# Patient Record
Sex: MALE | Race: WHITE | NOT HISPANIC OR LATINO | Employment: OTHER | URBAN - NONMETROPOLITAN AREA
[De-identification: names, ages, dates, MRNs, and addresses within clinical notes are randomized per-mention and may not be internally consistent; named-entity substitution may affect disease eponyms.]

---

## 2021-07-15 ENCOUNTER — APPOINTMENT (OUTPATIENT)
Dept: GENERAL RADIOLOGY | Facility: HOSPITAL | Age: 66
End: 2021-07-15

## 2021-07-15 ENCOUNTER — HOSPITAL ENCOUNTER (EMERGENCY)
Facility: HOSPITAL | Age: 66
Discharge: HOME | End: 2021-07-15
Attending: EMERGENCY MEDICINE | Admitting: EMERGENCY MEDICINE

## 2021-07-15 VITALS
DIASTOLIC BLOOD PRESSURE: 74 MMHG | RESPIRATION RATE: 18 BRPM | BODY MASS INDEX: 47.74 KG/M2 | SYSTOLIC BLOOD PRESSURE: 194 MMHG | WEIGHT: 315 LBS | TEMPERATURE: 98.1 F | OXYGEN SATURATION: 98 % | HEIGHT: 68 IN | HEART RATE: 74 BPM

## 2021-07-15 DIAGNOSIS — S43.031A: Primary | ICD-10-CM

## 2021-07-15 PROCEDURE — 73030 X-RAY EXAM OF SHOULDER: CPT

## 2021-07-15 PROCEDURE — 99283 EMERGENCY DEPT VISIT LOW MDM: CPT

## 2021-07-15 NOTE — ED PROVIDER NOTES
"Subjective   Chief Complaint: Right shoulder pain  History of Present Illness: 66-year-old male states he fell Sunday and sustained multiple injuries including fractures of T10 and T11 as well as a head injury, right shoulder injury and abrasions to his right knee and shin. He was seen in a hospital in [place]. He went saw his PCP for follow-up today and had a shoulder x-ray that showed \"dislocation\" he was sent to the ER for further evaluation. He states decreased range of motion of the right shoulder secondary to pain.  Onset: Sunday  Timing: Single inciting injury with persistent symptoms  Exacerbating / Alleviating factors: With range of motion and palpation of the right anterior shoulder  Associated symptoms: None      Nurses Notes reviewed and agree, including vitals, allergies, social history and prior medical history.          Review of Systems   Constitutional: Negative.    HENT: Negative.    Eyes: Negative.    Respiratory: Negative.    Cardiovascular: Negative.    Gastrointestinal: Negative.    Genitourinary: Negative.    Musculoskeletal:        Right shoulder pain   Skin: Negative.    Allergic/Immunologic: Negative.    Neurological: Negative.    Psychiatric/Behavioral: Negative.    All other systems reviewed and are negative.      Past Medical History:   Diagnosis Date   • Hypertension    • Injury of back    • Kidney stone    • Sleep apnea        No Known Allergies    Past Surgical History:   Procedure Laterality Date   • GASTRIC SLEEVE LAPAROSCOPIC     • HERNIA REPAIR     • SKIN BIOPSY         History reviewed. No pertinent family history.    Social History     Socioeconomic History   • Marital status:      Spouse name: Not on file   • Number of children: Not on file   • Years of education: Not on file   • Highest education level: Not on file   Tobacco Use   • Smoking status: Never Smoker   Substance and Sexual Activity   • Alcohol use: Never   • Drug use: Never           Objective   Physical " Exam  Vitals and nursing note reviewed.   Constitutional:       Appearance: Normal appearance. He is obese.   HENT:      Head: Normocephalic and atraumatic.      Nose: Nose normal.   Eyes:      Extraocular Movements: Extraocular movements intact.   Cardiovascular:      Rate and Rhythm: Normal rate and regular rhythm.      Pulses: Normal pulses.   Pulmonary:      Effort: Pulmonary effort is normal.   Musculoskeletal:      Right shoulder: Tenderness present. No deformity, effusion or laceration. Decreased range of motion. Normal strength. Normal pulse.      Cervical back: Normal range of motion.      Comments: Tenderness to the right anterior shoulder. Pain with active range of motion but I would passively move the patient's right upper extremity in abduction to horizontal as well as he is able to touch the back of his head with his right hand   Skin:     Comments: Abrasions to the right knee and shin, contusion to the right forehead   Neurological:      General: No focal deficit present.      Mental Status: He is alert. Mental status is at baseline.   Psychiatric:         Mood and Affect: Mood normal.         Behavior: Behavior normal.         Procedures           ED Course      Plain film shows inferior subluxation but unable to passively range the patient's shoulder with no signs of dislocation. Discussed with Dr. Marti. Will place in sling. Patient has pain meds at home as he is in pain management                                     MDM    Final diagnoses:   Inferior subluxation of right shoulder, initial encounter       ED Disposition  ED Disposition     ED Disposition Condition Comment    Discharge Stable           The orthopedist that your family doctor has referred you to    Schedule an appointment as soon as possible for a visit            Medication List      No changes were made to your prescriptions during this visit.          Kenton Solorzano PA-C  07/15/21 1600

## 2021-07-20 ENCOUNTER — OFFICE VISIT (OUTPATIENT)
Dept: ORTHOPEDIC SURGERY | Facility: CLINIC | Age: 66
End: 2021-07-20

## 2021-07-20 VITALS
BODY MASS INDEX: 47.74 KG/M2 | HEIGHT: 68 IN | WEIGHT: 315 LBS | TEMPERATURE: 97.3 F | SYSTOLIC BLOOD PRESSURE: 138 MMHG | DIASTOLIC BLOOD PRESSURE: 66 MMHG

## 2021-07-20 DIAGNOSIS — S89.92XA INJURY, KNEE, LEFT, INITIAL ENCOUNTER: Primary | ICD-10-CM
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
## 2021-07-20 DIAGNOSIS — S99.921A FOOT INJURY, RIGHT, INITIAL ENCOUNTER: ICD-10-CM

## 2021-07-20 DIAGNOSIS — S82.091A OTHER CLOSED FRACTURE OF RIGHT PATELLA, INITIAL ENCOUNTER: ICD-10-CM

## 2021-07-20 DIAGNOSIS — S92.344A CLOSED NONDISPLACED FRACTURE OF FOURTH METATARSAL BONE OF RIGHT FOOT, INITIAL ENCOUNTER: ICD-10-CM

## 2021-07-20 DIAGNOSIS — S80.211A ABRASION OF RIGHT KNEE, INITIAL ENCOUNTER: ICD-10-CM

## 2021-07-20 DIAGNOSIS — S92.514A CLOSED NONDISPLACED FRACTURE OF PROXIMAL PHALANX OF LESSER TOE OF RIGHT FOOT, INITIAL ENCOUNTER: ICD-10-CM

## 2021-07-20 PROCEDURE — 99204 OFFICE O/P NEW MOD 45 MIN: CPT | Performed by: PHYSICIAN ASSISTANT

## 2021-07-20 NOTE — PROGRESS NOTES
Subjective   Patient ID: Zbigniew Andrews is a 66 y.o. right hand dominant male  Initial Evaluation of the Right Knee (Here today for anterior knee pain.  States he was going down metal stairs on 7/7/21, when the step broke causing him to fall.  Was seen at ER in [place] with imaging.  Was recently seen at the [facility] ER on  7/11/21 with more imaging.) and Initial Evaluation of the Right Foot         History of Present Illness    Patient presents as a new patient with complaints of right knee and right foot pain that occurred after he fell down metal stairs on 7/7/2021.  He states the step broke causing him to fall from the fifth step.  He went to ER, he had x-rays of the right knee and right ankle which were negative for acute process.  He states he is feeling some better but still has discomfort to the right knee with an abrasion to the anterior right knee.    Pain Score: 5  Pain Location: Knee  Pain Orientation: Right     Pain Descriptors: Aching  Pain Frequency: Constant/continuous           Aggravating Factors: Bending           Result of Injury: Yes       Past Medical History:   Diagnosis Date   • Hypertension    • Injury of back    • Kidney stone    • Skin cancer    • Sleep apnea         Past Surgical History:   Procedure Laterality Date   • GASTRIC SLEEVE LAPAROSCOPIC     • HERNIA REPAIR     • SKIN BIOPSY         Family History   Problem Relation Age of Onset   • Hypertension Mother    • Cancer Mother    • Hypertension Father    • Heart disease Father        Social History     Socioeconomic History   • Marital status:      Spouse name: Not on file   • Number of children: Not on file   • Years of education: Not on file   • Highest education level: Not on file   Tobacco Use   • Smoking status: Never Smoker   • Smokeless tobacco: Never Used   Vaping Use   • Vaping Use: Never used   Substance and Sexual Activity   • Alcohol use: Never   • Drug use: Never   • Sexual activity: Defer          Current Outpatient Medications:   •  acetaminophen (TYLENOL) 500 MG tablet, Take 500 mg by mouth Every 4 (Four) Hours As Needed., Disp: , Rfl:   •  ASPIRIN 81 PO, aspirin  81mg, Disp: , Rfl:   •  cephalexin (KEFLEX) 500 MG capsule, Take 500 mg by mouth 3 (Three) Times a Day., Disp: , Rfl:   •  Cholecalciferol 50 MCG (2000 UT) tablet, Vitamin D2, Disp: , Rfl:   •  donepezil (ARICEPT) 10 MG tablet, donepezil 10 mg tablet, Disp: , Rfl:   •  fluticasone (FLONASE) 50 MCG/ACT nasal spray, fluticasone propionate 50 mcg/actuation nasal spray,suspension, Disp: , Rfl:   •  gabapentin (NEURONTIN) 300 MG capsule, gabapentin 300 mg capsule, Disp: , Rfl:   •  HYDROcodone-acetaminophen (NORCO) 5-325 MG per tablet, Take 1 tablet by mouth Every 6 (Six) Hours As Needed., Disp: , Rfl:   •  levocetirizine (XYZAL) 5 MG tablet, Take 5 mg by mouth Every Evening., Disp: , Rfl:   •  losartan (COZAAR) 100 MG tablet, losartan 100 mg tablet, Disp: , Rfl:   •  methocarbamol (Robaxin) 500 MG tablet, Robaxin 500 mg tablet, Disp: , Rfl:   •  multivitamin (MULTIPLE VITAMIN PO), Multiple Vitamin capsule  Daily, Disp: , Rfl:   •  niacin 500 MG tablet, Take 1,000 mg by mouth., Disp: , Rfl:   •  nystatin (nystatin) 901135 UNIT/GM powder, Nyamyc 100,000 unit/gram topical powder, Disp: , Rfl:   •  pyridoxine (VITAMIN B-6) 100 MG tablet, Take 1 tablet by mouth., Disp: , Rfl:   •  tamsulosin (FLOMAX) 0.4 MG capsule 24 hr capsule, tamsulosin 0.4 mg capsule, Disp: , Rfl:   •  asenapine maleate (SAPHRIS) 5 MG sublingual tablet sublingual tablet, Twice a day., Disp: , Rfl:   •  Cinnamon 500 MG capsule, Cinnamon, Disp: , Rfl:   •  guaiFENesin (Mucinex) 600 MG 12 hr tablet, 400 mg., Disp: , Rfl:     No Known Allergies    Review of Systems   Constitutional: Negative for diaphoresis, fever and unexpected weight change.   HENT: Negative for dental problem and sore throat.    Eyes: Negative for visual disturbance.   Respiratory: Negative for shortness of  "breath.    Cardiovascular: Negative for chest pain.   Gastrointestinal: Negative for abdominal pain, constipation, diarrhea, nausea and vomiting.   Genitourinary: Negative for difficulty urinating and frequency.   Musculoskeletal: Positive for arthralgias.   Neurological: Negative for headaches.   Hematological: Does not bruise/bleed easily.        I have reviewed the medical and surgical history, family history, social history, medications, and/or allergies, and the review of systems of this report.    Objective   /66   Temp 97.3 °F (36.3 °C)   Ht 172.7 cm (68\")   Wt (!) 154 kg (339 lb)   BMI 51.54 kg/m²    Physical Exam  Vitals and nursing note reviewed.   Constitutional:       Appearance: Normal appearance.   Pulmonary:      Effort: Pulmonary effort is normal.   Musculoskeletal:      Right knee: No deformity or effusion. Tenderness (superior patella) present.      Right ankle: No ecchymosis. No tenderness.      Right Achilles Tendon: No tenderness. Hazel's test negative.      Right foot: Normal capillary refill. Swelling and tenderness present. No deformity or bony tenderness. Normal pulse.   Neurological:      Mental Status: He is alert and oriented to person, place, and time.       Right Knee Exam     Other   Effusion: no effusion present           Extremity DVT signs are negative on physical exam with negative Lashell sign, no calf pain, no palpable cords and no skin tone change   Neurologic Exam     Mental Status   Oriented to person, place, and time.        The right knee extensor mechanism is intact.  Patient does have a noninfected superficial abrasion to the right anterior knee with a scab covering the abrasion          Assessment/Plan   Independent Review of Radiographic Studies:    I did review x-ray imaging from [facility] urgent care.  We also repeated x-ray imaging 3 views of the right knee weightbearing in the office reveal a nondisplaced superior patella fracture.  X-ray of the right " foot 3 view performed in the office reveal an acute fifth proximal phalanx fracture as well as a nondisplaced fourth metatarsal base fracture    Procedures       Diagnoses and all orders for this visit:    1. Injury, knee, left, initial encounter (Primary)  -     Cancel: XR Knee 3 View Left; Future    2. Foot injury, right, initial encounter  -     XR Foot 3+ View Right; Future    3. Other closed fracture of right patella, initial encounter    4. Closed nondisplaced fracture of proximal phalanx of lesser toe of right foot, initial encounter    5. Closed nondisplaced fracture of fourth metatarsal bone of right foot, initial encounter    6. Fall from slip, trip, or stumble, initial encounter    7. Abrasion of right knee, initial encounter       Orthopedic activities reviewed and patient expressed appreciation  Discussion of orthopedic goals  Risk, benefits, and merits of treatment alternatives reviewed with the patient and questions answered  Ice, heat, and/or modalities as beneficial    Recommendations/Plan:  Exercise, medications, injections, other patient advice, and return appointment as noted.  Patient is encouraged to call or return for any issues or concerns.  Patient was provided a fracture shoe as well as a tour right knee immobilizer.  Follow-up in 3 weeks repeat x-ray of the right knee and right foot    Patient agreeable to call or return sooner for any concerns.             EMR Dragon-transcription disclaimer:  This encounter note is an electronic transcription of spoken language to printed text.  Electronic transcription of spoken language may permit erroneous or at times nonsensical words or phrases to be inadvertently transcribed.  Although I have reviewed the note for such errors, some may still exist

## 2021-07-26 ENCOUNTER — OFFICE VISIT (OUTPATIENT)
Dept: ORTHOPEDIC SURGERY | Facility: CLINIC | Age: 66
End: 2021-07-26

## 2021-07-26 VITALS
WEIGHT: 315 LBS | RESPIRATION RATE: 18 BRPM | DIASTOLIC BLOOD PRESSURE: 78 MMHG | SYSTOLIC BLOOD PRESSURE: 130 MMHG | HEIGHT: 68 IN | BODY MASS INDEX: 47.74 KG/M2 | TEMPERATURE: 97.2 F
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
## 2021-07-26 DIAGNOSIS — M25.511 ACUTE PAIN OF RIGHT SHOULDER: Primary | ICD-10-CM

## 2021-07-26 DIAGNOSIS — S43.001A SHOULDER SUBLUXATION, RIGHT, INITIAL ENCOUNTER: ICD-10-CM

## 2021-07-26 DIAGNOSIS — M25.511 ACUTE PAIN OF RIGHT SHOULDER: ICD-10-CM

## 2021-07-26 PROCEDURE — 99213 OFFICE O/P EST LOW 20 MIN: CPT | Performed by: PHYSICIAN ASSISTANT

## 2021-07-26 RX ORDER — DIAZEPAM 5 MG/1
TABLET ORAL
Qty: 1 TABLET | Refills: 0 | Status: ON HOLD | OUTPATIENT
Start: 2021-07-26 | End: 2021-10-21

## 2021-07-26 NOTE — PROGRESS NOTES
Subjective   Patient ID: Zbigniew Andrews is a 66 y.o. right hand dominant male  Pain of the Right Shoulder (7/11/21 fell down stairs. Went to the ER 7/15/21.)         History of Present Illness    Patient presents with complaints of right shoulder injury that occurred after he fell down some steps on 7/11/2021.  He did have x-rays from the [facility] ER which revealed a subluxed right shoulder.  Patient notes improvement since the initial injury with his right shoulder.  Though, he does continue to experience pain with outward and upward movement.  Of note-I did review the urgent care notes that he was recently seen for x-rays of his cervical, thoracic and lumbar spine.  He was also noted to have some drainage from his right knee abrasion which is since subsided.  He is currently taking clindamycin for the right knee abrasion.  Patient and his wife states that the right knee appears to be improving    Past Medical History:   Diagnosis Date   • Hypertension    • Injury of back    • Kidney stone    • Skin cancer    • Sleep apnea         Past Surgical History:   Procedure Laterality Date   • GASTRIC SLEEVE LAPAROSCOPIC     • HERNIA REPAIR     • SKIN BIOPSY         Family History   Problem Relation Age of Onset   • Hypertension Mother    • Cancer Mother    • Hypertension Father    • Heart disease Father        Social History     Socioeconomic History   • Marital status:      Spouse name: Not on file   • Number of children: Not on file   • Years of education: Not on file   • Highest education level: Not on file   Tobacco Use   • Smoking status: Never Smoker   • Smokeless tobacco: Never Used   Vaping Use   • Vaping Use: Never used   Substance and Sexual Activity   • Alcohol use: Never   • Drug use: Never   • Sexual activity: Defer         Current Outpatient Medications:   •  acetaminophen (TYLENOL) 500 MG tablet, Take 500 mg by mouth Every 4 (Four) Hours As Needed., Disp: , Rfl:   •  asenapine maleate  (SAPHRIS) 5 MG sublingual tablet sublingual tablet, Twice a day., Disp: , Rfl:   •  ASPIRIN 81 PO, aspirin  81mg, Disp: , Rfl:   •  calcitonin, salmon, (MIACALCIN) 200 UNIT/ACT nasal spray, , Disp: , Rfl:   •  Cholecalciferol 50 MCG (2000 UT) tablet, Vitamin D2, Disp: , Rfl:   •  Cinnamon 500 MG capsule, Cinnamon, Disp: , Rfl:   •  clindamycin (CLEOCIN) 300 MG capsule, Take 1 capsule by mouth 3 (Three) Times a Day for 7 days., Disp: 21 capsule, Rfl: 0  •  diazePAM (Valium) 5 MG tablet, Take one tablet 30 minutes prior to MRI, Disp: 1 tablet, Rfl: 0  •  donepezil (ARICEPT) 10 MG tablet, donepezil 10 mg tablet, Disp: , Rfl:   •  fluticasone (FLONASE) 50 MCG/ACT nasal spray, fluticasone propionate 50 mcg/actuation nasal spray,suspension, Disp: , Rfl:   •  gabapentin (NEURONTIN) 300 MG capsule, gabapentin 300 mg capsule, Disp: , Rfl:   •  guaiFENesin (Mucinex) 600 MG 12 hr tablet, 400 mg., Disp: , Rfl:   •  HYDROcodone-acetaminophen (NORCO) 5-325 MG per tablet, Take 1 tablet by mouth Every 6 (Six) Hours As Needed., Disp: , Rfl:   •  levocetirizine (XYZAL) 5 MG tablet, Take 5 mg by mouth Every Evening., Disp: , Rfl:   •  losartan (COZAAR) 100 MG tablet, losartan 100 mg tablet, Disp: , Rfl:   •  methocarbamol (Robaxin) 500 MG tablet, Robaxin 500 mg tablet, Disp: , Rfl:   •  multivitamin (MULTIPLE VITAMIN PO), Multiple Vitamin capsule  Daily, Disp: , Rfl:   •  mupirocin (BACTROBAN) 2 % ointment, , Disp: , Rfl:   •  niacin 500 MG tablet, Take 1,000 mg by mouth., Disp: , Rfl:   •  nystatin (nystatin) 457979 UNIT/GM powder, Nyamyc 100,000 unit/gram topical powder, Disp: , Rfl:   •  pyridoxine (VITAMIN B-6) 100 MG tablet, Take 1 tablet by mouth., Disp: , Rfl:   •  tamsulosin (FLOMAX) 0.4 MG capsule 24 hr capsule, tamsulosin 0.4 mg capsule, Disp: , Rfl:   •  traMADol (ULTRAM) 50 MG tablet, , Disp: , Rfl:     No Known Allergies    Review of Systems   Constitutional: Negative for fever.   HENT: Negative for dental problem and  "voice change.    Eyes: Negative for visual disturbance.   Respiratory: Negative for shortness of breath.    Cardiovascular: Negative for chest pain.   Gastrointestinal: Negative for abdominal pain.   Genitourinary: Negative for dysuria.   Musculoskeletal: Positive for arthralgias (right shoulder ), gait problem (presents with cane) and joint swelling (right shoulder ).   Skin: Negative for rash.   Neurological: Negative for speech difficulty.   Hematological: Does not bruise/bleed easily.   Psychiatric/Behavioral: Negative for confusion.       I have reviewed the medical and surgical history, family history, social history, medications, and/or allergies, and the review of systems of this report.    Objective   /78 (BP Location: Left arm, Patient Position: Sitting, Cuff Size: Large Adult)   Temp 97.2 °F (36.2 °C)   Resp 18   Ht 172.7 cm (67.99\")   Wt (!) 153 kg (338 lb)   BMI 51.40 kg/m²    Physical Exam  Vitals and nursing note reviewed.   Constitutional:       Appearance: Normal appearance.   Pulmonary:      Effort: Pulmonary effort is normal.   Neurological:      Mental Status: He is alert and oriented to person, place, and time.   Psychiatric:         Behavior: Behavior normal.       Right Shoulder Exam     Range of Motion   Active abduction: 110   Passive abduction: 120   Extension: 20   Forward flexion: 120     Tests   Cross arm: positive  Impingement: positive  Drop arm: positive    Other   Sensation: normal  Pulse: present             Neurologic Exam     Mental Status   Oriented to person, place, and time.        There are no physical exam findings to suggest cellulitis or septic joint of the right knee.  He has full range of motion to the right knee.  The superficial abrasion is healing well      Assessment/Plan   Independent Review of Radiographic Studies:    No new imaging done today.  Narrative & Impression   PROCEDURE: XR SHOULDER 2+ VW RIGHT-     History: injury, was told by pcp yesterday it " was dislocated     COMPARISON: None.     FINDINGS:  A 3 view exam demonstrates no acute fracture. The humerus  appears inferiorly subluxed, however it is not in a subcoracoid  position. No soft tissue abnormality is seen.     IMPRESSION:  Inferior subluxation of the humerus with no evidence of a  fracture.               This report was finalized on 7/15/2021 3:25 PM by Olu Kaur M.D..         Procedures       Diagnoses and all orders for this visit:    1. Fall from slip, trip, or stumble, initial encounter (Primary)  -     MRI Shoulder Right Without Contrast    2. Shoulder subluxation, right, initial encounter  -     MRI Shoulder Right Without Contrast    3. Acute pain of right shoulder  -     MRI Shoulder Right Without Contrast       Orthopedic activities reviewed and patient expressed appreciation  Discussion of orthopedic goals  Risk, benefits, and merits of treatment alternatives reviewed with the patient and questions answered  Ice, heat, and/or modalities as beneficial    Recommendations/Plan:  Exercise, medications, injections, other patient advice, and return appointment as noted.  Patient is encouraged to call or return for any issues or concerns.  Follow-up after MRI  Patient agreeable to call or return sooner for any concerns.               EMR Dragon-transcription disclaimer:  This encounter note is an electronic transcription of spoken language to printed text.  Electronic transcription of spoken language may permit erroneous or at times nonsensical words or phrases to be inadvertently transcribed.  Although I have reviewed the note for such errors, some may still exist

## 2021-08-06 ENCOUNTER — OFFICE VISIT (OUTPATIENT)
Dept: NEUROSURGERY | Facility: CLINIC | Age: 66
End: 2021-08-06

## 2021-08-06 VITALS — BODY MASS INDEX: 47.74 KG/M2 | WEIGHT: 315 LBS | HEIGHT: 68 IN | TEMPERATURE: 97.3 F

## 2021-08-06 DIAGNOSIS — E66.01 MORBID OBESITY (HCC): ICD-10-CM

## 2021-08-06 DIAGNOSIS — M54.50 ACUTE BILATERAL LOW BACK PAIN WITHOUT SCIATICA: Primary | ICD-10-CM

## 2021-08-06 DIAGNOSIS — M54.2 NECK PAIN: ICD-10-CM

## 2021-08-06 PROCEDURE — 99203 OFFICE O/P NEW LOW 30 MIN: CPT | Performed by: NEUROLOGICAL SURGERY

## 2021-08-06 NOTE — PROGRESS NOTES
Patient: Zbigniew Andrews  : 1955    Primary Care Provider: Katina Cote APRN    Requesting Provider: As above        History    Chief Complaint:   1.  Low back pain muscle spasm.  2.  Neck pain.    History of Present Illness: Mr. Andrews is a 66-year-old retired gentleman who formerly worked for the [employer] company.  He is had chronic spinal difficulties and has known extensive arthritic change throughout his spine.  He has been treated for diminished range of motion and SI joint dysfunction in the past.  He has undergone spinal injections in the past.  His symptoms recently flared up after he suffered a fall on 2021 when some steps gave out under him.  His main complaint is left lower back pain with muscle spasm.  He has had some numbness in the right leg below the knee and in the left foot that comes and goes.  He denies any radicular leg pain.  He has no chest or abdominal symptoms.  He has neck pain without radicular spread of his symptoms.  He denies any bladder difficulties.  By report he had multiple other injuries and has had symptoms in his feet, his right knee, and his right shoulder since his accident.  He also describes tenderness over the right eyebrow.  He reports having suffered a closed head injury related to this fall.  The patient has had weight loss surgery and has lost 90 pounds.    Review of Systems   Constitutional: Positive for activity change and appetite change. Negative for chills, diaphoresis, fatigue, fever and unexpected weight change.   HENT: Negative for congestion, dental problem, drooling, ear discharge, ear pain, facial swelling, hearing loss, mouth sores, nosebleeds, postnasal drip, rhinorrhea, sinus pressure, sinus pain, sneezing, sore throat, tinnitus, trouble swallowing and voice change.    Eyes: Negative for photophobia, pain, discharge, redness, itching and visual disturbance.   Respiratory: Positive for apnea. Negative for cough, choking, chest tightness,  "shortness of breath, wheezing and stridor.    Cardiovascular: Positive for leg swelling. Negative for chest pain and palpitations.   Gastrointestinal: Positive for diarrhea. Negative for abdominal distention, abdominal pain, anal bleeding, blood in stool, constipation, nausea, rectal pain and vomiting.   Endocrine: Negative for cold intolerance, heat intolerance, polydipsia, polyphagia and polyuria.   Genitourinary: Negative for decreased urine volume, difficulty urinating, dysuria, enuresis, flank pain, frequency, genital sores, hematuria and urgency.   Musculoskeletal: Positive for arthralgias, back pain, gait problem, joint swelling, myalgias, neck pain and neck stiffness.   Skin: Positive for wound. Negative for color change, pallor and rash.   Allergic/Immunologic: Positive for environmental allergies. Negative for food allergies and immunocompromised state.   Neurological: Negative for dizziness, tremors, seizures, syncope, facial asymmetry, speech difficulty, weakness, light-headedness, numbness and headaches.   Hematological: Negative for adenopathy. Does not bruise/bleed easily.   Psychiatric/Behavioral: Positive for decreased concentration and sleep disturbance. Negative for agitation, behavioral problems, confusion, dysphoric mood, hallucinations, self-injury and suicidal ideas. The patient is not nervous/anxious and is not hyperactive.    All other systems reviewed and are negative.      The patient's past medical history, past surgical history, family history, and social history have been reviewed at length in the electronic medical record.    Physical Exam:   Temp 97.3 °F (36.3 °C)   Ht 172.7 cm (68\")   Wt (!) 158 kg (347 lb 6.4 oz)   BMI 52.82 kg/m²   CONSTITUTIONAL: Patient is well-nourished, pleasant and appears stated age.  CV: Heart regular rate and rhythm without murmur, rub, or gallop.  PULMONARY: Lungs are clear to ascultation.  MUSCULOSKELETAL:  Neck tenderness to palpation is not observed. "   ROM in the neck is mildly limited in all directions.  Straight leg raising is negative.  Yovanny signs are negative.  There is no tenderness in his dorsal neck, mid back, low back to palpation.  Engine motion is somewhat limited in his right shoulder due to pain.  NEUROLOGICAL:  Orientation, memory, attention span, language function, and cognition have been examined and are intact.  Strength is intact in the upper and lower extremities to direct testing.  Muscle tone is normal throughout.  Station and gait are normal.  Sensation is intact to light touch testing throughout.  Deep tendon reflexes are difficult to elicit throughout..  Lupillo's Sign is negative bilaterally. No clonus is elicited.  Coordination is intact.      Medical Decision Making    Data Review:   (All imaging studies were personally reviewed unless stated otherwise)  I reviewed CTs of the cervical, thoracic, and lumbar spine.  Prominent anterior osteophytes are present throughout the spinal axis.  There is diffuse spondylosis.  There may be some moderate spinal stenosis at L4-5.  There is a small fracture through an anterior osteophyte at the T10-11 level.  This does not involve or compromise the spine.    Diagnosis:   1.  Small osteophyte fracture in the low thoracic region.  This is not concordant with his pain is really in his left lower back and upper buttock region.  2.  Diffuse spinal strain.    Treatment Options:   There is no role for surgery in this setting.  The patient states the symptoms are are already substantially improved.  Treatment will need to remain symptomatic.  I expect his symptoms to dissipate over time.  There is no lasting physiologic compromise to his spine from his reported injury.       Diagnosis Plan   1. Morbid obesity (CMS/[facility])         Scribed for Tejas Brewer MD by GHASSAN Lyons 8/6/2021 11:52 EDT      I, Dr. Brewer, personally performed the services described in the documentation, as scribed in my  presence, and it is both accurate and complete.

## 2021-08-17 ENCOUNTER — HOSPITAL ENCOUNTER (OUTPATIENT)
Dept: MRI IMAGING | Facility: HOSPITAL | Age: 66
Discharge: HOME | End: 2021-08-17
Admitting: PHYSICIAN ASSISTANT

## 2021-08-17 PROCEDURE — 73221 MRI JOINT UPR EXTREM W/O DYE: CPT

## 2021-08-19 ENCOUNTER — OFFICE VISIT (OUTPATIENT)
Dept: ORTHOPEDIC SURGERY | Facility: CLINIC | Age: 66
End: 2021-08-19

## 2021-08-19 VITALS
TEMPERATURE: 96.8 F | DIASTOLIC BLOOD PRESSURE: 70 MMHG | BODY MASS INDEX: 47.74 KG/M2 | SYSTOLIC BLOOD PRESSURE: 138 MMHG | HEIGHT: 68 IN | RESPIRATION RATE: 18 BRPM | WEIGHT: 315 LBS

## 2021-08-19 DIAGNOSIS — M25.571 ARTHRALGIA OF RIGHT FOOT: Primary | ICD-10-CM

## 2021-08-19 DIAGNOSIS — M25.511 ACUTE PAIN OF RIGHT SHOULDER: ICD-10-CM

## 2021-08-19 DIAGNOSIS — S92.514A CLOSED NONDISPLACED FRACTURE OF PROXIMAL PHALANX OF LESSER TOE OF RIGHT FOOT, INITIAL ENCOUNTER: ICD-10-CM

## 2021-08-19 DIAGNOSIS — S46.011A TRAUMATIC TEAR OF RIGHT ROTATOR CUFF, UNSPECIFIED TEAR EXTENT, INITIAL ENCOUNTER: ICD-10-CM

## 2021-08-19 DIAGNOSIS — M19.019 AC JOINT ARTHROPATHY: ICD-10-CM

## 2021-08-19 DIAGNOSIS — S43.431A LABRAL TEAR OF SHOULDER, RIGHT, INITIAL ENCOUNTER: ICD-10-CM

## 2021-08-19 DIAGNOSIS — S92.344A CLOSED NONDISPLACED FRACTURE OF FOURTH METATARSAL BONE OF RIGHT FOOT, INITIAL ENCOUNTER: ICD-10-CM

## 2021-08-19 PROCEDURE — 99214 OFFICE O/P EST MOD 30 MIN: CPT | Performed by: PHYSICIAN ASSISTANT

## 2021-08-19 RX ORDER — SERTRALINE HYDROCHLORIDE 25 MG/1
25 TABLET, FILM COATED ORAL DAILY
COMMUNITY
Start: 2021-08-09 | End: 2023-02-01 | Stop reason: SDUPTHER

## 2021-08-19 RX ORDER — TRAMADOL HYDROCHLORIDE 50 MG/1
50 TABLET ORAL EVERY 8 HOURS PRN
COMMUNITY
Start: 2021-08-13 | End: 2021-10-21 | Stop reason: HOSPADM

## 2021-08-19 RX ORDER — GABAPENTIN 600 MG/1
600 TABLET ORAL 2 TIMES DAILY
COMMUNITY
Start: 2021-08-13

## 2021-08-19 NOTE — PROGRESS NOTES
Subjective   Patient ID: Zbigniew Andrews is a 66 y.o. right hand dominant male  Follow-up of the Right Shoulder (MRI results.), Follow-up of the Right Knee (Doi: 7/11/21.), and Follow-up of the Right Foot         History of Present Illness  Patient is following up to review MRI results of the right shoulder as well as following up for his right knee abrasion and right foot fractures.  He denies having any pain to the knees or the right foot.  He has been wearing a fracture shoe as directed since his initial visit.    He reports that most of his pain is from his back fractures which was recently evaluated by neurosurgery-Dr. Manav Brewer.  He does have right shoulder pain and soreness with certain positions.                                                 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         Past Surgical History:   Procedure Laterality Date   • GASTRIC SLEEVE LAPAROSCOPIC     • HERNIA REPAIR     • PYLOROMYOTOMY  1954   • SKIN BIOPSY     • UMBILICAL HERNIA REPAIR  2000       Family History   Problem Relation Age of Onset   • Hypertension Mother    • Cancer Mother    • Arthritis Mother    • Asthma Mother    • Ovarian cancer Mother    • Hypertension Father    • Heart disease Father    • Arthritis Father    • Kidney disease Father    • Alzheimer's disease Father    • Kidney failure Father    • Arthritis Sister    • Arthritis Brother    • Hypertension Brother    • Hepatitis Brother    • Atrial fibrillation Brother        Social History     Socioeconomic History   • Marital status:      Spouse name: Not on file   • Number of children: Not on file   • Years of education: Not on file   • Highest education  level: Not on file   Tobacco Use   • Smoking status: Never Smoker   • Smokeless tobacco: Never Used   Vaping Use   • Vaping Use: Never used   Substance and Sexual Activity   • Alcohol use: Never     Comment: Social, quit [id number]   • Drug use: Never   • Sexual activity: Defer         Current Outpatient Medications:   •  acetaminophen (TYLENOL) 500 MG tablet, Take 500 mg by mouth Every 4 (Four) Hours As Needed., Disp: , Rfl:   •  Cholecalciferol 50 MCG (2000 UT) tablet, Vitamin D2, Disp: , Rfl:   •  Cinnamon 500 MG capsule, Cinnamon, Disp: , Rfl:   •  diazePAM (Valium) 5 MG tablet, Take one tablet 30 minutes prior to MRI (Patient taking differently: Take 5 mg by mouth. Take one tablet 30 minutes prior to MRI.), Disp: 1 tablet, Rfl: 0  •  donepezil (ARICEPT) 10 MG tablet, donepezil 10 mg tablet, Disp: , Rfl:   •  fluticasone (FLONASE) 50 MCG/ACT nasal spray, fluticasone propionate 50 mcg/actuation nasal spray,suspension, Disp: , Rfl:   •  gabapentin (NEURONTIN) 300 MG capsule, Take 300 mg by mouth 3 (Three) Times a Day., Disp: , Rfl:   •  gabapentin (NEURONTIN) 600 MG tablet, , Disp: , Rfl:   •  guaiFENesin (Mucinex) 600 MG 12 hr tablet, 400 mg., Disp: , Rfl:   •  HYDROcodone-acetaminophen (NORCO) 5-325 MG per tablet, Take 1 tablet by mouth Every 6 (Six) Hours As Needed., Disp: , Rfl:   •  levocetirizine (XYZAL) 5 MG tablet, Take 5 mg by mouth Every Evening., Disp: , Rfl:   •  losartan (COZAAR) 100 MG tablet, losartan 100 mg tablet, Disp: , Rfl:   •  methocarbamol (Robaxin) 500 MG tablet, Robaxin 500 mg tablet, Disp: , Rfl:   •  multivitamin (MULTIPLE VITAMIN PO), Multiple Vitamin capsule  Daily, Disp: , Rfl:   •  niacin 500 MG tablet, Take 1,000 mg by mouth., Disp: , Rfl:   •  nystatin (nystatin) 007241 UNIT/GM powder, Apply  topically to the appropriate area as directed As Needed., Disp: , Rfl:   •  pyridoxine (VITAMIN B-6) 100 MG tablet, Take 1 tablet by mouth., Disp: , Rfl:   •  sertraline (ZOLOFT) 25 MG tablet, ,  "Disp: , Rfl:   •  tamsulosin (FLOMAX) 0.4 MG capsule 24 hr capsule, tamsulosin 0.4 mg capsule, Disp: , Rfl:   •  traMADol (ULTRAM) 50 MG tablet, , Disp: , Rfl:     No Known Allergies    Review of Systems   Constitutional: Negative for fever.   HENT: Negative for dental problem and voice change.    Eyes: Negative for visual disturbance.   Respiratory: Negative for shortness of breath.    Cardiovascular: Negative for chest pain.   Gastrointestinal: Negative for abdominal pain.   Genitourinary: Negative for dysuria.   Musculoskeletal: Positive for arthralgias (right shoulder/back.) and joint swelling (right ankle ). Negative for gait problem.   Skin: Negative for rash.   Neurological: Negative for speech difficulty.   Hematological: Does not bruise/bleed easily.   Psychiatric/Behavioral: Negative for confusion.       I have reviewed the medical and surgical history, family history, social history, medications, and/or allergies, and the review of systems of this report.    Objective   /70 (BP Location: Left arm, Patient Position: Sitting, Cuff Size: Large Adult)   Temp 96.8 °F (36 °C)   Resp 18   Ht 172.7 cm (67.99\")   Wt (!) 157 kg (346 lb)   BMI 52.62 kg/m²    Physical Exam  Vitals and nursing note reviewed.   Constitutional:       Appearance: Normal appearance.   Pulmonary:      Effort: Pulmonary effort is normal.   Musculoskeletal:      Right knee: No erythema, ecchymosis or bony tenderness. No tenderness.      Right foot: Normal capillary refill. No swelling, deformity, Charcot foot, foot drop, tenderness or bony tenderness. Normal pulse.      Comments: The abrasion to right knee has healed well.   Neurological:      Mental Status: He is alert and oriented to person, place, and time.       Right Shoulder Exam     Tenderness   The patient is experiencing tenderness in the biceps tendon and acromioclavicular joint.    Range of Motion   Active abduction: 110   Passive abduction: 120   Forward flexion: 120 "   Internal rotation 0 degrees: Sacrum     Tests   Apprehension: positive  Cross arm: positive  Drop arm: positive    Other   Sensation: normal  Pulse: present           Extremity DVT signs are negative on physical exam with negative Lashell sign, no calf pain, no palpable cords and no skin tone change   Neurologic Exam     Mental Status   Oriented to person, place, and time.              Assessment/Plan   Independent Review of Radiographic Studies:    X-ray of the right foot 3 view performed in the office independently reviewed reveals healed fractures of the proximal phalanx as well as fourth metatarsal bone  Study Result    Narrative & Impression   PROCEDURE: MRI SHOULDER RIGHT WO CONTRAST-     HISTORY: Shoulder pain, rotator cuff disorder suspected, xray done     PROCEDURE: Multiplanar multisequence imaging of the shoulder was  performed.     FINDINGS: There is a high-grade articular sided partial-thickness tear  of the supraspinatus tendon. There is a possible small full-thickness  tear of the subscapularis tendon at its attachment site to the greater  tuberosity seen on sagittal image six measuring 5 mm. There is no  tendinous retraction. The infraspinatus and teres minor tendons appear  intact. There is no evidence of abnormal rotator cuff muscle edema or  atrophy. The biceps tendon follows its normal intra-articular course and  is located within the bicipital groove. There is abnormal signal within  the superior labrum which may reflect a labral tear. There is no joint  capsule thickening. There is a type II acromion. Hypertrophic  degenerative changes are present at the acromioclavicular joint. Fluid  is seen in the subacromial/subdeltoid bursa.     IMPRESSION:     1. High-grade partial-thickness tear of the supraspinatus tendon.  2. Suggestion of a small, 5 mm full-thickness tear of the subscapularis  tendon at its attachment site to the greater tuberosity.  3. Abnormal signal in the superior labrum  "suspicious for tear.  4. Acromioclavicular joint space arthrosis.     This report was finalized on 8/18/2021 7:42 AM by Olu Kaur M.D..             Procedures       Diagnoses and all orders for this visit:    1. Arthralgia of right foot (Primary)  -     XR Foot 3+ View Right    2. Acute pain of right shoulder    3. Closed nondisplaced fracture of proximal phalanx of lesser toe of right foot, initial encounter    4. Closed nondisplaced fracture of fourth metatarsal bone of right foot, initial encounter    5. Traumatic tear of right rotator cuff, unspecified tear extent, initial encounter    6. Labral tear of shoulder, right, initial encounter    7. AC joint arthropathy       Discussion of orthopedic goals  Risk, benefits, and merits of treatment alternatives reviewed with the patient and questions answered  The nature of the proposed surgery reviewed with the patient including risks, benefits, rehabilitation, recovery timeframe, and outcome expectations    Recommendations/Plan:  Surgery: Surgery proposed at this visit as noted.  Patient is encouraged to call or return for any issues or concerns.    Patient agreeable to call or return sooner for any concerns.  Patient no longer requires the fracture shoe.  We discussed in detail the treatment options which included the right shoulder cortisone injection followed by physical therapy versus right shoulder arthroscopy.  Patient states he would like to proceed with shoulder arthroscopy to \"fix the problem\"    Due to the patient's severe central sleep apnea he will need clearance from his pulmonologist located in [place].( Dr. Moya)  Plan: Right shoulder arthroscopy, subacromial decompression, distal clavicle excision with acromioplasty.  Repair rotator cuff tendon, labral repair versus debridement and related procedures             EMR Dragon-transcription disclaimer:  This encounter note is an electronic transcription of spoken language to printed text.  " Electronic transcription of spoken language may permit erroneous or at times nonsensical words or phrases to be inadvertently transcribed.  Although I have reviewed the note for such errors, some may still exist

## 2021-10-01 ENCOUNTER — OFFICE VISIT (OUTPATIENT)
Dept: CARDIOLOGY | Facility: CLINIC | Age: 66
End: 2021-10-01

## 2021-10-01 ENCOUNTER — PATIENT ROUNDING (BHMG ONLY) (OUTPATIENT)
Dept: CARDIOLOGY | Facility: CLINIC | Age: 66
End: 2021-10-01

## 2021-10-01 VITALS
WEIGHT: 315 LBS | BODY MASS INDEX: 49.44 KG/M2 | DIASTOLIC BLOOD PRESSURE: 60 MMHG | HEART RATE: 69 BPM | HEIGHT: 67 IN | OXYGEN SATURATION: 97 % | SYSTOLIC BLOOD PRESSURE: 90 MMHG

## 2021-10-01 DIAGNOSIS — E66.01 MORBID OBESITY WITH BMI OF 50.0-59.9, ADULT (HCC): ICD-10-CM

## 2021-10-01 DIAGNOSIS — R07.9 CHEST PAIN, UNSPECIFIED TYPE: Primary | ICD-10-CM

## 2021-10-01 DIAGNOSIS — I10 PRIMARY HYPERTENSION: ICD-10-CM

## 2021-10-01 PROCEDURE — 93000 ELECTROCARDIOGRAM COMPLETE: CPT | Performed by: INTERNAL MEDICINE

## 2021-10-01 PROCEDURE — 99204 OFFICE O/P NEW MOD 45 MIN: CPT | Performed by: INTERNAL MEDICINE

## 2021-10-01 RX ORDER — LOSARTAN POTASSIUM 50 MG/1
50 TABLET ORAL DAILY
Qty: 90 TABLET | Refills: 3 | Status: SHIPPED | OUTPATIENT
Start: 2021-10-01 | End: 2022-10-12 | Stop reason: SDUPTHER

## 2021-10-01 NOTE — PROGRESS NOTES
"     [facility] Cardiology OP Consult Note    Zbigniew Andrews  [id number]  10/01/2021    Referred By: Katina Cote APRN    Chief Complaint: Chest pain    History of Present Illness:   Mr. Zbigniew Andrews is a 66 y.o. male who presents to the Cardiology Clinic for evaluation of chest pain.  The patient has a past medical history significant for hypertension, gout, BPH, and obesity with a BMI 54 kg/m².  He does not have any significant past cardiac history.  He reports undergoing ischemic evaluation including a coronary angiogram in [place] in , with no significant CAD at that time.  He presents to cardiology clinic today for evaluation of chest pain.  The patient reports sustaining a fall several months ago, which resulted in several vertebral fractures.  Since that time, he has had a bilateral chest discomfort which ranges from \"sharp\" to \"dull and squeezing.\"  He reports the discomfort is primarily aggravated with deep respirations.  He denies any association of his chest pain with exertion.  No significant exertional dyspnea.  No history of orthopnea, PND, or lower extremity swelling.  He denies any nausea, vomiting, or diaphoresis associated with his chest discomfort.  No other specific complaints today.    Past Cardiac Testin. Last Coronary Angio: , reportedly no significant CAD  2. Prior Stress Testing: None known  3. Last Echo: Records pending  4. Prior Holter Monitor: None    Review of Systems:   Review of Systems   Constitutional: Negative for activity change, appetite change, chills, diaphoresis, fatigue, fever, unexpected weight gain and unexpected weight loss.   Eyes: Negative for blurred vision and double vision.   Respiratory: Positive for chest tightness. Negative for cough, shortness of breath and wheezing.    Cardiovascular: Positive for chest pain. Negative for palpitations and leg swelling.   Gastrointestinal: Negative for abdominal pain, anal bleeding, blood in " stool and GERD.   Endocrine: Negative for cold intolerance and heat intolerance.   Genitourinary: Negative for hematuria.   Neurological: Negative for dizziness, syncope, weakness and light-headedness.   Hematological: Does not bruise/bleed easily.   Psychiatric/Behavioral: Negative for depressed mood and stress. The patient is not nervous/anxious.        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        Past Surgical History:   Past Surgical History:   Procedure Laterality Date   • GASTRIC SLEEVE LAPAROSCOPIC     • HERNIA REPAIR     • PYLOROMYOTOMY  1954   • SKIN BIOPSY     • UMBILICAL HERNIA REPAIR  2000       Family History:   Family History   Problem Relation Age of Onset   • Hypertension Mother    • Cancer Mother    • Arthritis Mother    • Asthma Mother    • Ovarian cancer Mother    • Hypertension Father    • Heart disease Father    • Arthritis Father    • Kidney disease Father    • Alzheimer's disease Father    • Kidney failure Father    • Arthritis Sister    • Arthritis Brother    • Hypertension Brother    • Hepatitis Brother    • Atrial fibrillation Brother        Social History:   Social History     Socioeconomic History   • Marital status:      Spouse name: Not on file   • Number of children: Not on file   • Years of education: Not on file   • Highest education level: Not on file   Tobacco Use   • Smoking status: Never Smoker   • Smokeless tobacco: Never Used   Vaping Use   • Vaping Use: Never used   Substance and Sexual Activity   • Alcohol use: Never     Comment: Social, quit [id number]   • Drug use: Never   • Sexual activity: Defer       Medications:     Current Outpatient Medications:   •   acetaminophen (TYLENOL) 500 MG tablet, Take 500 mg by mouth Every 4 (Four) Hours As Needed., Disp: , Rfl:   •  Ascorbic Acid (VITAMIN C PO), Take  by mouth., Disp: , Rfl:   •  Cholecalciferol 50 MCG (2000 UT) tablet, Vitamin D2, Disp: , Rfl:   •  Cinnamon 500 MG capsule, Cinnamon, Disp: , Rfl:   •  donepezil (ARICEPT) 10 MG tablet, donepezil 10 mg tablet, Disp: , Rfl:   •  fluticasone (FLONASE) 50 MCG/ACT nasal spray, fluticasone propionate 50 mcg/actuation nasal spray,suspension, Disp: , Rfl:   •  gabapentin (NEURONTIN) 600 MG tablet, , Disp: , Rfl:   •  Glucosamine HCl (GLUCOSAMINE PO), Take  by mouth., Disp: , Rfl:   •  guaiFENesin (Mucinex) 600 MG 12 hr tablet, 400 mg., Disp: , Rfl:   •  levocetirizine (XYZAL) 5 MG tablet, Take 5 mg by mouth Every Evening., Disp: , Rfl:   •  losartan (COZAAR) 50 MG tablet, Take 1 tablet by mouth Daily., Disp: 90 tablet, Rfl: 3  •  methocarbamol (Robaxin) 500 MG tablet, Robaxin 500 mg tablet, Disp: , Rfl:   •  Misc Natural Products (BLACK CHERRY CONCENTRATE PO), Take  by mouth., Disp: , Rfl:   •  Multiple Vitamins-Minerals (ZINC PO), Take  by mouth., Disp: , Rfl:   •  multivitamin (MULTIPLE VITAMIN PO), Multiple Vitamin capsule  Daily, Disp: , Rfl:   •  niacin 500 MG tablet, Take 1,000 mg by mouth., Disp: , Rfl:   •  NON FORMULARY, , Disp: , Rfl:   •  nystatin (nystatin) 767614 UNIT/GM powder, Apply  topically to the appropriate area as directed As Needed., Disp: , Rfl:   •  pyridoxine (VITAMIN B-6) 100 MG tablet, Take 1 tablet by mouth., Disp: , Rfl:   •  sertraline (ZOLOFT) 25 MG tablet, , Disp: , Rfl:   •  tamsulosin (FLOMAX) 0.4 MG capsule 24 hr capsule, tamsulosin 0.4 mg capsule, Disp: , Rfl:   •  traMADol (ULTRAM) 50 MG tablet, , Disp: , Rfl:   •  Turmeric (QC TUMERIC COMPLEX PO), Take  by mouth., Disp: , Rfl:   •  diazePAM (Valium) 5 MG tablet, Take one tablet 30 minutes prior to MRI (Patient taking differently: Take 5 mg by mouth. Take one tablet 30 minutes prior to  "MRI.), Disp: 1 tablet, Rfl: 0  •  gabapentin (NEURONTIN) 300 MG capsule, Take 300 mg by mouth 3 (Three) Times a Day., Disp: , Rfl:   •  HYDROcodone-acetaminophen (NORCO) 5-325 MG per tablet, Take 1 tablet by mouth Every 6 (Six) Hours As Needed., Disp: , Rfl:     Allergies:   No Known Allergies    Physical Exam:  Vital Signs:   Vitals:    10/01/21 1109 10/01/21 1118 10/01/21 1119   BP: 130/82 130/82 90/60   BP Location: Right arm Left arm Left arm   Patient Position: Sitting Sitting Sitting   Cuff Size: Adult Adult Adult   Pulse: 69     SpO2: 97%     Weight: (!) 157 kg (347 lb)     Height: 170.2 cm (67\")         Physical Exam  Constitutional:       General: He is not in acute distress.     Appearance: He is well-developed. He is obese. He is not diaphoretic.   HENT:      Head: Normocephalic and atraumatic.   Eyes:      General: No scleral icterus.     Pupils: Pupils are equal, round, and reactive to light.   Neck:      Trachea: No tracheal deviation.   Cardiovascular:      Rate and Rhythm: Normal rate and regular rhythm.      Heart sounds: Normal heart sounds. No murmur heard.   No friction rub. No gallop.       Comments: Normal JVD.  Pulmonary:      Effort: Pulmonary effort is normal. No respiratory distress.      Breath sounds: Normal breath sounds. No stridor. No wheezing or rales.   Chest:      Chest wall: No tenderness.   Abdominal:      General: Bowel sounds are normal. There is no distension.      Palpations: Abdomen is soft.      Tenderness: There is no abdominal tenderness. There is no guarding or rebound.      Comments: Obese abdomen   Musculoskeletal:         General: No swelling. Normal range of motion.      Cervical back: Neck supple. No tenderness.   Lymphadenopathy:      Cervical: No cervical adenopathy.   Skin:     General: Skin is warm and dry.      Findings: No erythema.   Neurological:      General: No focal deficit present.      Mental Status: He is alert and oriented to person, place, and time. "   Psychiatric:         Mood and Affect: Mood normal.         Behavior: Behavior normal.         Results Review:   I reviewed the patient's new clinical results.  I personally viewed and interpreted the patient's EKG/Telemetry data      ECG 12 Lead    Date/Time: 10/1/2021 12:08 PM  Performed by: Darryl Chavarria MD  Authorized by: Darryl Chavarria MD   Comparison: not compared with previous ECG   Rhythm: sinus rhythm  Rate: normal  QRS axis: left  Other findings: non-specific ST-T wave changes  Other findings comments: Poor R wave progression    Clinical impression: abnormal EKG            Assessment / Plan:     1. Chest pain  --No significant past cardiac history  --Current chest pain is noncardiac in character, appears to be musculoskeletal in etiology likely related to prior fall  --Last ischemic evaluation with coronary angiogram in 2012 with no significant CAD  --ECG today without evidence of acute or prior ischemia  --Given chest pain is noncardiac in character, no indication for further ischemic evaluation at this time  --Should chest pain progress, would then consider PET MPS    2.  Hypertension  --History of hypertension, maintained on losartan for BP control  --Currently reports symptoms of orthostatic hypotension, orthostatic vitals positive today  --Will decrease losartan to 50 mg, consider discontinuation if no improvement in orthostasis    3. Morbid obesity with BMI of 50.0-59.9  --Recommend weight loss through diet and exercise      Follow Up:   Return in about 6 months (around 4/1/2022).      Thank you for allowing me to participate in the care of your patient. Please to not hesitate to contact me with additional questions or concerns.     ELEAZAR Chavarria MD  Interventional Cardiology   10/01/2021  11:13 EDT

## 2021-10-01 NOTE — PROGRESS NOTES
"Patient rounding call - received voicemail for \"Mayda.\"  Mailed a welcome card to patient's home address and provided my contact information in the event patient would like to share feedback regarding today's visit.  "

## 2021-10-11 ENCOUNTER — PREP FOR SURGERY (OUTPATIENT)
Dept: OTHER | Facility: HOSPITAL | Age: 66
End: 2021-10-11

## 2021-10-11 DIAGNOSIS — S46.011A TRAUMATIC TEAR OF RIGHT ROTATOR CUFF, UNSPECIFIED TEAR EXTENT, INITIAL ENCOUNTER: Primary | ICD-10-CM

## 2021-10-11 DIAGNOSIS — M19.019 AC JOINT ARTHROPATHY: ICD-10-CM

## 2021-10-11 RX ORDER — CEFAZOLIN SODIUM 2 G/50ML
2 SOLUTION INTRAVENOUS
Status: CANCELLED | OUTPATIENT
Start: 2021-10-12 | End: 2021-10-13

## 2021-10-18 DIAGNOSIS — M19.019 AC JOINT ARTHROPATHY: ICD-10-CM

## 2021-10-18 DIAGNOSIS — Z98.890 S/P ARTHROSCOPY OF RIGHT SHOULDER: ICD-10-CM

## 2021-10-18 DIAGNOSIS — M25.511 ACUTE PAIN OF RIGHT SHOULDER: ICD-10-CM

## 2021-10-18 DIAGNOSIS — S46.011A TRAUMATIC TEAR OF RIGHT ROTATOR CUFF, UNSPECIFIED TEAR EXTENT, INITIAL ENCOUNTER: Primary | ICD-10-CM

## 2021-10-18 RX ORDER — OXYCODONE HYDROCHLORIDE 5 MG/1
5 TABLET ORAL DAILY PRN
Qty: 7 TABLET | Refills: 0 | Status: SHIPPED | OUTPATIENT
Start: 2021-10-18 | End: 2022-01-08

## 2021-10-19 ENCOUNTER — OFFICE VISIT (OUTPATIENT)
Dept: ORTHOPEDIC SURGERY | Facility: CLINIC | Age: 66
End: 2021-10-19

## 2021-10-19 ENCOUNTER — HOSPITAL ENCOUNTER (OUTPATIENT)
Dept: GENERAL RADIOLOGY | Facility: HOSPITAL | Age: 66
Discharge: HOME | End: 2021-10-19

## 2021-10-19 ENCOUNTER — PRE-ADMISSION TESTING (OUTPATIENT)
Dept: PREADMISSION TESTING | Facility: HOSPITAL | Age: 66
End: 2021-10-19

## 2021-10-19 VITALS
RESPIRATION RATE: 18 BRPM | WEIGHT: 315 LBS | BODY MASS INDEX: 49.44 KG/M2 | DIASTOLIC BLOOD PRESSURE: 50 MMHG | HEIGHT: 67 IN | SYSTOLIC BLOOD PRESSURE: 116 MMHG | TEMPERATURE: 98.4 F

## 2021-10-19 VITALS — WEIGHT: 315 LBS | BODY MASS INDEX: 47.74 KG/M2 | HEIGHT: 68 IN

## 2021-10-19 DIAGNOSIS — S46.011A TRAUMATIC TEAR OF RIGHT ROTATOR CUFF, UNSPECIFIED TEAR EXTENT, INITIAL ENCOUNTER: ICD-10-CM

## 2021-10-19 DIAGNOSIS — M19.019 AC JOINT ARTHROPATHY: ICD-10-CM

## 2021-10-19 DIAGNOSIS — M25.511 ACUTE PAIN OF RIGHT SHOULDER: ICD-10-CM

## 2021-10-19 DIAGNOSIS — S46.011A TRAUMATIC TEAR OF RIGHT ROTATOR CUFF, UNSPECIFIED TEAR EXTENT, INITIAL ENCOUNTER: Primary | ICD-10-CM

## 2021-10-19 LAB
ALBUMIN SERPL-MCNC: 4.1 G/DL (ref 3.5–5.2)
ALBUMIN/GLOB SERPL: 1.2 G/DL
ALP SERPL-CCNC: 106 U/L (ref 39–117)
ALT SERPL W P-5'-P-CCNC: 11 U/L (ref 1–41)
ANION GAP SERPL CALCULATED.3IONS-SCNC: 9.8 MMOL/L (ref 5–15)
AST SERPL-CCNC: 14 U/L (ref 1–40)
BASOPHILS # BLD AUTO: 0.05 10*3/MM3 (ref 0–0.2)
BASOPHILS NFR BLD AUTO: 0.8 % (ref 0–1.5)
BILIRUB SERPL-MCNC: 0.5 MG/DL (ref 0–1.2)
BILIRUB UR QL STRIP: NEGATIVE
BUN SERPL-MCNC: 19 MG/DL (ref 8–23)
BUN/CREAT SERPL: 22.9 (ref 7–25)
CALCIUM SPEC-SCNC: 9.2 MG/DL (ref 8.6–10.5)
CHLORIDE SERPL-SCNC: 104 MMOL/L (ref 98–107)
CLARITY UR: CLEAR
CO2 SERPL-SCNC: 24.2 MMOL/L (ref 22–29)
COLOR UR: YELLOW
CREAT SERPL-MCNC: 0.83 MG/DL (ref 0.76–1.27)
DEPRECATED RDW RBC AUTO: 44.4 FL (ref 37–54)
EOSINOPHIL # BLD AUTO: 0.24 10*3/MM3 (ref 0–0.4)
EOSINOPHIL NFR BLD AUTO: 4 % (ref 0.3–6.2)
ERYTHROCYTE [DISTWIDTH] IN BLOOD BY AUTOMATED COUNT: 12.9 % (ref 12.3–15.4)
GFR SERPL CREATININE-BSD FRML MDRD: 92 ML/MIN/1.73
GLOBULIN UR ELPH-MCNC: 3.4 GM/DL
GLUCOSE SERPL-MCNC: 89 MG/DL (ref 65–99)
GLUCOSE UR STRIP-MCNC: NEGATIVE MG/DL
HCT VFR BLD AUTO: 44.5 % (ref 37.5–51)
HGB BLD-MCNC: 14.9 G/DL (ref 13–17.7)
HGB UR QL STRIP.AUTO: NEGATIVE
IMM GRANULOCYTES # BLD AUTO: 0.02 10*3/MM3 (ref 0–0.05)
IMM GRANULOCYTES NFR BLD AUTO: 0.3 % (ref 0–0.5)
KETONES UR QL STRIP: NEGATIVE
LEUKOCYTE ESTERASE UR QL STRIP.AUTO: NEGATIVE
LYMPHOCYTES # BLD AUTO: 1.15 10*3/MM3 (ref 0.7–3.1)
LYMPHOCYTES NFR BLD AUTO: 19 % (ref 19.6–45.3)
MCH RBC QN AUTO: 31.4 PG (ref 26.6–33)
MCHC RBC AUTO-ENTMCNC: 33.5 G/DL (ref 31.5–35.7)
MCV RBC AUTO: 93.7 FL (ref 79–97)
MONOCYTES # BLD AUTO: 0.71 10*3/MM3 (ref 0.1–0.9)
MONOCYTES NFR BLD AUTO: 11.7 % (ref 5–12)
NEUTROPHILS NFR BLD AUTO: 3.89 10*3/MM3 (ref 1.7–7)
NEUTROPHILS NFR BLD AUTO: 64.2 % (ref 42.7–76)
NITRITE UR QL STRIP: NEGATIVE
NRBC BLD AUTO-RTO: 0 /100 WBC (ref 0–0.2)
PH UR STRIP.AUTO: <=5 [PH] (ref 5–8)
PLATELET # BLD AUTO: 173 10*3/MM3 (ref 140–450)
PMV BLD AUTO: 9.9 FL (ref 6–12)
POTASSIUM SERPL-SCNC: 4.2 MMOL/L (ref 3.5–5.2)
PROT SERPL-MCNC: 7.5 G/DL (ref 6–8.5)
PROT UR QL STRIP: NEGATIVE
RBC # BLD AUTO: 4.75 10*6/MM3 (ref 4.14–5.8)
SODIUM SERPL-SCNC: 138 MMOL/L (ref 136–145)
SP GR UR STRIP: 1.02 (ref 1–1.03)
UROBILINOGEN UR QL STRIP: NORMAL
WBC # BLD AUTO: 6.06 10*3/MM3 (ref 3.4–10.8)

## 2021-10-19 PROCEDURE — 87081 CULTURE SCREEN ONLY: CPT

## 2021-10-19 PROCEDURE — 80053 COMPREHEN METABOLIC PANEL: CPT

## 2021-10-19 PROCEDURE — 81003 URINALYSIS AUTO W/O SCOPE: CPT

## 2021-10-19 PROCEDURE — U0004 COV-19 TEST NON-CDC HGH THRU: HCPCS

## 2021-10-19 PROCEDURE — 36415 COLL VENOUS BLD VENIPUNCTURE: CPT

## 2021-10-19 PROCEDURE — C9803 HOPD COVID-19 SPEC COLLECT: HCPCS

## 2021-10-19 PROCEDURE — U0005 INFEC AGEN DETEC AMPLI PROBE: HCPCS

## 2021-10-19 PROCEDURE — 85025 COMPLETE CBC W/AUTO DIFF WBC: CPT

## 2021-10-19 PROCEDURE — 71046 X-RAY EXAM CHEST 2 VIEWS: CPT

## 2021-10-19 PROCEDURE — S0260 H&P FOR SURGERY: HCPCS | Performed by: PHYSICIAN ASSISTANT

## 2021-10-19 NOTE — H&P (VIEW-ONLY)
Zbigniew Andrews is a 66 y.o. male   Pre-op Exam of the Right Shoulder (arthroscopy, subacromial decompression, distal clavicle excision with acromioplasty.  Repair rotator cuff tendon, labral repair versus debridement and related procedures 10/21/21.)         CHIEF COMPLAINT:    Preop for planned right shoulder arthroscopy  Preop for a planned right shoulder arthroscopy  History of Present Illness  Patient presents for his preop evaluation for a planned right shoulder arthroscopy patient has been experiencing right shoulder pain since his injury on 7/11/2021.  Patient fell down steps on 7 11,021.  He was evaluated at [facility] diagnosed with a subluxed right shoulder.  He is continued to experience pain with upward and outward movement to the right shoulder.  He denies numbness or tingling but states the pain is interfering with his ADLs.    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          Current Outpatient Medications:   •  acetaminophen (TYLENOL) 500 MG tablet, Take 500 mg by mouth Every 4 (Four) Hours As Needed., Disp: , Rfl:   •  Ascorbic Acid (VITAMIN C PO), Take  by mouth., Disp: , Rfl:   •  Cholecalciferol 50 MCG (2000 UT) tablet, Vitamin D2, Disp: , Rfl:   •  Cinnamon 500 MG capsule, Cinnamon, Disp: , Rfl:   •  diazePAM (Valium) 5 MG tablet, Take one tablet 30 minutes prior to MRI (Patient taking differently: Take 5 mg by mouth. Take one tablet 30 minutes prior to MRI.), Disp: 1 tablet, Rfl: 0  •  donepezil (ARICEPT) 10 MG tablet, donepezil 10 mg tablet, Disp: , Rfl:   •  fluticasone (FLONASE) 50 MCG/ACT nasal spray, fluticasone propionate 50 mcg/actuation nasal spray,suspension,  Disp: , Rfl:   •  gabapentin (NEURONTIN) 600 MG tablet, , Disp: , Rfl:   •  Glucosamine HCl (GLUCOSAMINE PO), Take  by mouth., Disp: , Rfl:   •  guaiFENesin (Mucinex) 600 MG 12 hr tablet, 400 mg., Disp: , Rfl:   •  HYDROcodone-acetaminophen (NORCO) 5-325 MG per tablet, Take 1 tablet by mouth Every 6 (Six) Hours As Needed., Disp: , Rfl:   •  levocetirizine (XYZAL) 5 MG tablet, Take 5 mg by mouth Every Evening., Disp: , Rfl:   •  losartan (COZAAR) 50 MG tablet, Take 1 tablet by mouth Daily., Disp: 90 tablet, Rfl: 3  •  methocarbamol (Robaxin) 500 MG tablet, Robaxin 500 mg tablet, Disp: , Rfl:   •  Misc Natural Products (BLACK CHERRY CONCENTRATE PO), Take  by mouth., Disp: , Rfl:   •  Multiple Vitamins-Minerals (ZINC PO), Take  by mouth., Disp: , Rfl:   •  multivitamin (MULTIPLE VITAMIN PO), Multiple Vitamin capsule  Daily, Disp: , Rfl:   •  niacin 500 MG tablet, Take 1,000 mg by mouth., Disp: , Rfl:   •  NON FORMULARY, , Disp: , Rfl:   •  nystatin (nystatin) 147601 UNIT/GM powder, Apply  topically to the appropriate area as directed As Needed., Disp: , Rfl:   •  oxyCODONE (ROXICODONE) 5 MG immediate release tablet, Take 1 tablet by mouth Daily As Needed for post op breakthrough pain in addition to Tramadol three time daily, Disp: 7 tablet, Rfl: 0  •  pyridoxine (VITAMIN B-6) 100 MG tablet, Take 1 tablet by mouth., Disp: , Rfl:   •  sertraline (ZOLOFT) 25 MG tablet, , Disp: , Rfl:   •  tamsulosin (FLOMAX) 0.4 MG capsule 24 hr capsule, tamsulosin 0.4 mg capsule, Disp: , Rfl:   •  traMADol (ULTRAM) 50 MG tablet, , Disp: , Rfl:   •  Turmeric (QC TUMERIC COMPLEX PO), Take  by mouth., Disp: , Rfl:     Past Surgical History:   Procedure Laterality Date   • GASTRIC SLEEVE LAPAROSCOPIC     • HERNIA REPAIR     • PYLOROMYOTOMY  1954   • SKIN BIOPSY     • UMBILICAL HERNIA REPAIR  2000       Family History   Problem Relation Age of Onset   • Hypertension Mother    • Cancer Mother    • Arthritis Mother    • Asthma Mother    •  "Ovarian cancer Mother    • Hypertension Father    • Heart disease Father    • Arthritis Father    • Kidney disease Father    • Alzheimer's disease Father    • Kidney failure Father    • Arthritis Sister    • Arthritis Brother    • Hypertension Brother    • Hepatitis Brother    • Atrial fibrillation Brother        Social History     Socioeconomic History   • Marital status:    Tobacco Use   • Smoking status: Never Smoker   • Smokeless tobacco: Never Used   Vaping Use   • Vaping Use: Never used   Substance and Sexual Activity   • Alcohol use: Never     Comment: Social, quit [id number]   • Drug use: Never   • Sexual activity: Defer        No Known Allergies    Review of Systems   Constitutional: Negative for fever.   HENT: Negative for dental problem and voice change.    Eyes: Negative for visual disturbance.   Respiratory: Negative for shortness of breath.    Cardiovascular: Negative for chest pain.   Gastrointestinal: Negative for abdominal pain.   Genitourinary: Negative for dysuria.   Musculoskeletal: Positive for arthralgias (right shoulder ). Negative for gait problem and joint swelling.        Limited mobility in right shoulder.   Skin: Negative for rash.   Neurological: Negative for speech difficulty.   Hematological: Does not bruise/bleed easily.   Psychiatric/Behavioral: Negative for confusion.       I have reviewed the medical and surgical history, family history, social history, medications, and/or allergies, and the review of systems of this report.    PHYSICAL EXAMINATION:       /50 (BP Location: Left arm, Patient Position: Sitting, Cuff Size: Large Adult)   Temp 98.4 °F (36.9 °C)   Resp 18   Ht 170.2 cm (67.01\")   Wt (!) 159 kg (350 lb 6.4 oz)   BMI 54.87 kg/m²     GENERAL [x] Well developed  []Ill appearing [x] No acute distress    HEENT [x]No acute changes [x] Normocephalic, atraumatic    NECK [x]Supple  [] No midline tenderness    LUNGS [x]Clear bilaterally [x]No wheezes []Rhonchi [] " Rales    HEART [x] Regular rate  [x] Regular rhythm [] Irregular    ABDOMEN [x] Soft [x] Not tender [x] Not distended [x] Normal sounds    VAS/EXT [x] Normal Pulses []Edema []Cyanosis             SKIN [x] Warm [x]Dry []Pink []Ecchymosis []Cool    NEURO [x] Sensation Intact [x] Motor Intact [x] Pulse intact  [] Dysesthesias:_________  []Weakness:__________             Physical Exam  Vitals and nursing note reviewed.   Constitutional:       Appearance: Normal appearance.   Pulmonary:      Effort: Pulmonary effort is normal.   Musculoskeletal:      Right shoulder: Tenderness and bony tenderness present. Normal strength.   Neurological:      Mental Status: He is alert and oriented to person, place, and time.   Psychiatric:         Behavior: Behavior normal.       Right Shoulder Exam     Range of Motion   Active abduction: 120   Forward flexion: 130            Positive Neer sign right upper extremity.  Positive Jewell's test right upper extremity  Positive drop arm test right upper extremity    Neurologic Exam     Mental Status   Oriented to person, place, and time.                Independent Review of Radiographic Studies:    No new imaging done today.   Narrative & Impression   PROCEDURE: MRI SHOULDER RIGHT WO CONTRAST-     HISTORY: Shoulder pain, rotator cuff disorder suspected, xray done     PROCEDURE: Multiplanar multisequence imaging of the shoulder was  performed.     FINDINGS: There is a high-grade articular sided partial-thickness tear  of the supraspinatus tendon. There is a possible small full-thickness  tear of the subscapularis tendon at its attachment site to the greater  tuberosity seen on sagittal image six measuring 5 mm. There is no  tendinous retraction. The infraspinatus and teres minor tendons appear  intact. There is no evidence of abnormal rotator cuff muscle edema or  atrophy. The biceps tendon follows its normal intra-articular course and  is located within the bicipital groove. There is  abnormal signal within  the superior labrum which may reflect a labral tear. There is no joint  capsule thickening. There is a type II acromion. Hypertrophic  degenerative changes are present at the acromioclavicular joint. Fluid  is seen in the subacromial/subdeltoid bursa.     IMPRESSION:     1. High-grade partial-thickness tear of the supraspinatus tendon.  2. Suggestion of a small, 5 mm full-thickness tear of the subscapularis  tendon at its attachment site to the greater tuberosity.  3. Abnormal signal in the superior labrum suspicious for tear.  4. Acromioclavicular joint space arthrosis.     This report was finalized on 8/18/2021 7:42 AM by Olu Kaur M.D..       Laboratory and Other Studies:  No new results reviewed today.   Medical Decision Making:    Stable neurovascular exam.        Diagnoses and all orders for this visit:    1. Traumatic tear of right rotator cuff, unspecified tear extent, initial encounter (Primary)    2. AC joint arthropathy    3. Acute pain of right shoulder        *SPECIAL INSTRUCTIONS:  Multi-modal analgesia.  Multi-modal DVT prophylaxis.  Rehabilitation PT/OT planned.    Assessment/Plan:  The nature of the proposed surgery reviewed with the patient including risks, benefits, rehabilitation, recovery timeframe, and outcome expectations          Risks, benefits, and alternative treatments discussed with the patient: [x] Yes [] No    Risk benefits and merits of the proposed surgery were discussed and the patient's questions were answered risks discussed including and not limited to:  Anesthesia reactions  Infection  Deep venous thrombosis and pulmonary embolus  Nerve, vascular or tendon injury  Fracture  Deformity  Stiffness  Weakness  Skin necrosis  Revision surgery or further treatment  Recurrence of problem and condition     Informed consent: [] Signed  [x] To be obtained at hospital  [] Both    Recommendations/Plan:    Again we discussed the option of formal physical  "therapy but he politely declined.  He states he knows his right shoulder \"needs to be fixed\" he would like to proceed with surgical fixation    PLANNED SURGICAL PROCEDURE: Right shoulder arthroscopy subacromial decompression, distal clavicle excision with acromioplasty.  Rotator cuff repair, labral debridement versus repair and related procedures    Patient is encouraged and agreeable to call or return sooner for any issues or concerns.  "

## 2021-10-19 NOTE — DISCHARGE INSTRUCTIONS
PAT PASS GIVEN/REVIEWED WITH PT.  VERBALIZED UNDERSTANDING OF THE FOLLOWING:  DO NOT EAT, DRINK, SMOKE, USE SMOKELESS TOBACCO OR CHEW GUM AFTER MIDNIGHT THE NIGHT BEFORE SURGERY.  THIS ALSO INCLUDES HARD CANDIES AND MINTS.    DO NOT SHAVE THE AREA TO BE OPERATED ON AT LEAST 48 HOURS PRIOR TO THE PROCEDURE.  DO NOT WEAR MAKE UP OR NAIL POLISH.  DO NOT LEAVE IN ANY PIERCING OR WEAR JEWELRY THE DAY OF SURGERY.      DO NOT USE ADHESIVES IF YOU WEAR DENTURES.    DO NOT WEAR EYE CONTACTS; BRING IN YOUR GLASSES.    ONLY TAKE MEDICATION THE MORNING OF YOUR PROCEDURE IF INSTRUCTED BY YOUR SURGEON WITH ENOUGH WATER TO SWALLOW THE MEDICATION.  IF YOUR SURGEON DID NOT SPECIFY WHICH MEDICATIONS TO TAKE, YOU WILL NEED TO CALL THEIR OFFICE FOR FURTHER INSTRUCTIONS AND DO AS THEY INSTRUCT.    LEAVE ANYTHING YOU CONSIDER VALUABLE AT HOME.    YOU WILL NEED TO ARRANGE FOR SOMEONE TO DRIVE YOU HOME AFTER SURGERY.  IT IS RECOMMENDED THAT YOU DO NOT DRIVE, WORK, DRINK ALCOHOL OR MAKE MAJOR DECISIONS FOR AT LEAST 24 HOURS AFTER YOUR PROCEDURE IS COMPLETE.      THE DAY OF YOUR PROCEDURE, BRING IN THE FOLLOWING IF APPLICABLE:   PICTURE ID AND INSURANCE/MEDICARE OR MEDICAID CARDS/ANY CO-PAY THAT MAY BE DUE   COPY OF ADVANCED DIRECTIVE/LIVING WILL/POWER OR    CPAP/BIPAP/INHALERS   SKIN PREP SHEET   YOUR PREADMISSION TESTING PASS (IF NOT A PHONE HISTORY)        Chlorhexidine wipes along with instruction/verification sheet given to pt.  Instructed pt to date, time, and initial the verification sheet once skin prep has been  completed, and [facility].  Pt. Verbalizes understanding.      COVID self-quarantine instructions reviewed with the pt.  Verbalized understanding.

## 2021-10-19 NOTE — PROGRESS NOTES
Zbigniew Andrews is a 66 y.o. male   Pre-op Exam of the Right Shoulder (arthroscopy, subacromial decompression, distal clavicle excision with acromioplasty.  Repair rotator cuff tendon, labral repair versus debridement and related procedures 10/21/21.)         CHIEF COMPLAINT:    Preop for planned right shoulder arthroscopy  Preop for a planned right shoulder arthroscopy  History of Present Illness  Patient presents for his preop evaluation for a planned right shoulder arthroscopy patient has been experiencing right shoulder pain since his injury on 7/11/2021.  Patient fell down steps on 7 11,021.  He was evaluated at [facility] diagnosed with a subluxed right shoulder.  He is continued to experience pain with upward and outward movement to the right shoulder.  He denies numbness or tingling but states the pain is interfering with his ADLs.    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          Current Outpatient Medications:   •  acetaminophen (TYLENOL) 500 MG tablet, Take 500 mg by mouth Every 4 (Four) Hours As Needed., Disp: , Rfl:   •  Ascorbic Acid (VITAMIN C PO), Take  by mouth., Disp: , Rfl:   •  Cholecalciferol 50 MCG (2000 UT) tablet, Vitamin D2, Disp: , Rfl:   •  Cinnamon 500 MG capsule, Cinnamon, Disp: , Rfl:   •  diazePAM (Valium) 5 MG tablet, Take one tablet 30 minutes prior to MRI (Patient taking differently: Take 5 mg by mouth. Take one tablet 30 minutes prior to MRI.), Disp: 1 tablet, Rfl: 0  •  donepezil (ARICEPT) 10 MG tablet, donepezil 10 mg tablet, Disp: , Rfl:   •  fluticasone (FLONASE) 50 MCG/ACT nasal spray, fluticasone propionate 50 mcg/actuation nasal spray,suspension,  Disp: , Rfl:   •  gabapentin (NEURONTIN) 600 MG tablet, , Disp: , Rfl:   •  Glucosamine HCl (GLUCOSAMINE PO), Take  by mouth., Disp: , Rfl:   •  guaiFENesin (Mucinex) 600 MG 12 hr tablet, 400 mg., Disp: , Rfl:   •  HYDROcodone-acetaminophen (NORCO) 5-325 MG per tablet, Take 1 tablet by mouth Every 6 (Six) Hours As Needed., Disp: , Rfl:   •  levocetirizine (XYZAL) 5 MG tablet, Take 5 mg by mouth Every Evening., Disp: , Rfl:   •  losartan (COZAAR) 50 MG tablet, Take 1 tablet by mouth Daily., Disp: 90 tablet, Rfl: 3  •  methocarbamol (Robaxin) 500 MG tablet, Robaxin 500 mg tablet, Disp: , Rfl:   •  Misc Natural Products (BLACK CHERRY CONCENTRATE PO), Take  by mouth., Disp: , Rfl:   •  Multiple Vitamins-Minerals (ZINC PO), Take  by mouth., Disp: , Rfl:   •  multivitamin (MULTIPLE VITAMIN PO), Multiple Vitamin capsule  Daily, Disp: , Rfl:   •  niacin 500 MG tablet, Take 1,000 mg by mouth., Disp: , Rfl:   •  NON FORMULARY, , Disp: , Rfl:   •  nystatin (nystatin) [id number] UNIT/GM powder, Apply  topically to the appropriate area as directed As Needed., Disp: , Rfl:   •  oxyCODONE (ROXICODONE) 5 MG immediate release tablet, Take 1 tablet by mouth Daily As Needed for post op breakthrough pain in addition to Tramadol three time daily, Disp: 7 tablet, Rfl: 0  •  pyridoxine (VITAMIN B-6) 100 MG tablet, Take 1 tablet by mouth., Disp: , Rfl:   •  sertraline (ZOLOFT) 25 MG tablet, , Disp: , Rfl:   •  tamsulosin (FLOMAX) 0.4 MG capsule 24 hr capsule, tamsulosin 0.4 mg capsule, Disp: , Rfl:   •  traMADol (ULTRAM) 50 MG tablet, , Disp: , Rfl:   •  Turmeric (QC TUMERIC COMPLEX PO), Take  by mouth., Disp: , Rfl:     Past Surgical History:   Procedure Laterality Date   • GASTRIC SLEEVE LAPAROSCOPIC     • HERNIA REPAIR     • PYLOROMYOTOMY  1954   • SKIN BIOPSY     • UMBILICAL HERNIA REPAIR  2000       Family History   Problem Relation Age of Onset   • Hypertension Mother    • Cancer Mother    • Arthritis Mother    • Asthma Mother    •  "Ovarian cancer Mother    • Hypertension Father    • Heart disease Father    • Arthritis Father    • Kidney disease Father    • Alzheimer's disease Father    • Kidney failure Father    • Arthritis Sister    • Arthritis Brother    • Hypertension Brother    • Hepatitis Brother    • Atrial fibrillation Brother        Social History     Socioeconomic History   • Marital status:    Tobacco Use   • Smoking status: Never Smoker   • Smokeless tobacco: Never Used   Vaping Use   • Vaping Use: Never used   Substance and Sexual Activity   • Alcohol use: Never     Comment: Social, quit [id number]   • Drug use: Never   • Sexual activity: Defer        No Known Allergies    Review of Systems   Constitutional: Negative for fever.   HENT: Negative for dental problem and voice change.    Eyes: Negative for visual disturbance.   Respiratory: Negative for shortness of breath.    Cardiovascular: Negative for chest pain.   Gastrointestinal: Negative for abdominal pain.   Genitourinary: Negative for dysuria.   Musculoskeletal: Positive for arthralgias (right shoulder ). Negative for gait problem and joint swelling.        Limited mobility in right shoulder.   Skin: Negative for rash.   Neurological: Negative for speech difficulty.   Hematological: Does not bruise/bleed easily.   Psychiatric/Behavioral: Negative for confusion.       I have reviewed the medical and surgical history, family history, social history, medications, and/or allergies, and the review of systems of this report.    PHYSICAL EXAMINATION:       /50 (BP Location: Left arm, Patient Position: Sitting, Cuff Size: Large Adult)   Temp 98.4 °F (36.9 °C)   Resp 18   Ht 170.2 cm (67.01\")   Wt (!) 159 kg (350 lb 6.4 oz)   BMI 54.87 kg/m²     GENERAL [x] Well developed  []Ill appearing [x] No acute distress    HEENT [x]No acute changes [x] Normocephalic, atraumatic    NECK [x]Supple  [] No midline tenderness    LUNGS [x]Clear bilaterally [x]No wheezes []Rhonchi [] " Rales    HEART [x] Regular rate  [x] Regular rhythm [] Irregular    ABDOMEN [x] Soft [x] Not tender [x] Not distended [x] Normal sounds    VAS/EXT [x] Normal Pulses []Edema []Cyanosis             SKIN [x] Warm [x]Dry []Pink []Ecchymosis []Cool    NEURO [x] Sensation Intact [x] Motor Intact [x] Pulse intact  [] Dysesthesias:_________  []Weakness:__________             Physical Exam  Vitals and nursing note reviewed.   Constitutional:       Appearance: Normal appearance.   Pulmonary:      Effort: Pulmonary effort is normal.   Musculoskeletal:      Right shoulder: Tenderness and bony tenderness present. Normal strength.   Neurological:      Mental Status: He is alert and oriented to person, place, and time.   Psychiatric:         Behavior: Behavior normal.       Right Shoulder Exam     Range of Motion   Active abduction: 120   Forward flexion: 130            Positive Neer sign right upper extremity.  Positive McLeod's test right upper extremity  Positive drop arm test right upper extremity    Neurologic Exam     Mental Status   Oriented to person, place, and time.                Independent Review of Radiographic Studies:    No new imaging done today.   Narrative & Impression   PROCEDURE: MRI SHOULDER RIGHT WO CONTRAST-     HISTORY: Shoulder pain, rotator cuff disorder suspected, xray done     PROCEDURE: Multiplanar multisequence imaging of the shoulder was  performed.     FINDINGS: There is a high-grade articular sided partial-thickness tear  of the supraspinatus tendon. There is a possible small full-thickness  tear of the subscapularis tendon at its attachment site to the greater  tuberosity seen on sagittal image six measuring 5 mm. There is no  tendinous retraction. The infraspinatus and teres minor tendons appear  intact. There is no evidence of abnormal rotator cuff muscle edema or  atrophy. The biceps tendon follows its normal intra-articular course and  is located within the bicipital groove. There is  abnormal signal within  the superior labrum which may reflect a labral tear. There is no joint  capsule thickening. There is a type II acromion. Hypertrophic  degenerative changes are present at the acromioclavicular joint. Fluid  is seen in the subacromial/subdeltoid bursa.     IMPRESSION:     1. High-grade partial-thickness tear of the supraspinatus tendon.  2. Suggestion of a small, 5 mm full-thickness tear of the subscapularis  tendon at its attachment site to the greater tuberosity.  3. Abnormal signal in the superior labrum suspicious for tear.  4. Acromioclavicular joint space arthrosis.     This report was finalized on 8/18/2021 7:42 AM by Olu Kaur M.D..       Laboratory and Other Studies:  No new results reviewed today.   Medical Decision Making:    Stable neurovascular exam.        Diagnoses and all orders for this visit:    1. Traumatic tear of right rotator cuff, unspecified tear extent, initial encounter (Primary)    2. AC joint arthropathy    3. Acute pain of right shoulder        *SPECIAL INSTRUCTIONS:  Multi-modal analgesia.  Multi-modal DVT prophylaxis.  Rehabilitation PT/OT planned.    Assessment/Plan:  The nature of the proposed surgery reviewed with the patient including risks, benefits, rehabilitation, recovery timeframe, and outcome expectations          Risks, benefits, and alternative treatments discussed with the patient: [x] Yes [] No    Risk benefits and merits of the proposed surgery were discussed and the patient's questions were answered risks discussed including and not limited to:  Anesthesia reactions  Infection  Deep venous thrombosis and pulmonary embolus  Nerve, vascular or tendon injury  Fracture  Deformity  Stiffness  Weakness  Skin necrosis  Revision surgery or further treatment  Recurrence of problem and condition     Informed consent: [] Signed  [x] To be obtained at hospital  [] Both    Recommendations/Plan:    Again we discussed the option of formal physical  "therapy but he politely declined.  He states he knows his right shoulder \"needs to be fixed\" he would like to proceed with surgical fixation    PLANNED SURGICAL PROCEDURE: Right shoulder arthroscopy subacromial decompression, distal clavicle excision with acromioplasty.  Rotator cuff repair, labral debridement versus repair and related procedures    Patient is encouraged and agreeable to call or return sooner for any issues or concerns.  "

## 2021-10-20 LAB
MRSA SPEC QL CULT: NORMAL
SARS-COV-2 RNA NOSE QL NAA+PROBE: NOT DETECTED

## 2021-10-21 ENCOUNTER — APPOINTMENT (OUTPATIENT)
Dept: ULTRASOUND IMAGING | Facility: HOSPITAL | Age: 66
End: 2021-10-21

## 2021-10-21 ENCOUNTER — HOSPITAL ENCOUNTER (OUTPATIENT)
Facility: HOSPITAL | Age: 66
Setting detail: HOSPITAL OUTPATIENT SURGERY
Discharge: HOME | End: 2021-10-21
Attending: ORTHOPAEDIC SURGERY | Admitting: ORTHOPAEDIC SURGERY

## 2021-10-21 ENCOUNTER — ANESTHESIA EVENT (OUTPATIENT)
Dept: PERIOP | Facility: HOSPITAL | Age: 66
End: 2021-10-21

## 2021-10-21 ENCOUNTER — ANESTHESIA (OUTPATIENT)
Dept: PERIOP | Facility: HOSPITAL | Age: 66
End: 2021-10-21

## 2021-10-21 VITALS
SYSTOLIC BLOOD PRESSURE: 153 MMHG | TEMPERATURE: 97.6 F | OXYGEN SATURATION: 96 % | HEART RATE: 83 BPM | RESPIRATION RATE: 24 BRPM | DIASTOLIC BLOOD PRESSURE: 78 MMHG

## 2021-10-21 DIAGNOSIS — S46.011A TRAUMATIC TEAR OF RIGHT ROTATOR CUFF, UNSPECIFIED TEAR EXTENT, INITIAL ENCOUNTER: ICD-10-CM

## 2021-10-21 DIAGNOSIS — M19.019 AC JOINT ARTHROPATHY: ICD-10-CM

## 2021-10-21 PROCEDURE — 25010000002 SUCCINYLCHOLINE PER 20 MG: Performed by: NURSE ANESTHETIST, CERTIFIED REGISTERED

## 2021-10-21 PROCEDURE — 25010000002 EPINEPHRINE PER 0.1 MG: Performed by: ORTHOPAEDIC SURGERY

## 2021-10-21 PROCEDURE — 25010000002 ONDANSETRON PER 1 MG: Performed by: NURSE ANESTHETIST, CERTIFIED REGISTERED

## 2021-10-21 PROCEDURE — 23412 REPAIR ROTATOR CUFF CHRONIC: CPT | Performed by: ORTHOPAEDIC SURGERY

## 2021-10-21 PROCEDURE — 94799 UNLISTED PULMONARY SVC/PX: CPT

## 2021-10-21 PROCEDURE — 25010000002 DEXAMETHASONE PER 1 MG: Performed by: NURSE ANESTHETIST, CERTIFIED REGISTERED

## 2021-10-21 PROCEDURE — 25010000002 MIDAZOLAM PER 1MG: Performed by: NURSE ANESTHETIST, CERTIFIED REGISTERED

## 2021-10-21 PROCEDURE — C1713 ANCHOR/SCREW BN/BN,TIS/BN: HCPCS | Performed by: ORTHOPAEDIC SURGERY

## 2021-10-21 PROCEDURE — 25010000002 PROPOFOL 200 MG/20ML EMULSION: Performed by: NURSE ANESTHETIST, CERTIFIED REGISTERED

## 2021-10-21 PROCEDURE — 25010000003 CEFAZOLIN SODIUM-DEXTROSE 2-3 GM-%(50ML) RECONSTITUTED SOLUTION: Performed by: PHYSICIAN ASSISTANT

## 2021-10-21 PROCEDURE — 25010000002 KETOROLAC TROMETHAMINE PER 15 MG: Performed by: NURSE ANESTHETIST, CERTIFIED REGISTERED

## 2021-10-21 PROCEDURE — 29824 SHO ARTHRS SRG DSTL CLAVICLC: CPT | Performed by: ORTHOPAEDIC SURGERY

## 2021-10-21 PROCEDURE — 25010000003 CEFAZOLIN PER 500 MG: Performed by: ORTHOPAEDIC SURGERY

## 2021-10-21 PROCEDURE — 29823 SHO ARTHRS SRG XTNSV DBRDMT: CPT | Performed by: ORTHOPAEDIC SURGERY

## 2021-10-21 DEVICE — HEALIX BR ANCHOR W/ORTHOCORD TCP/PLGA ABSORBABLE ANCHOR (1) VIOLET (1) BLUE STRAND, SIZE 2 (5 METRIC) ORTHOCORD BRAIDED COMPOSITE SUTURE, 36 INCHES (91CM) 5.5MM
Type: IMPLANTABLE DEVICE | Site: SHOULDER | Status: FUNCTIONAL
Brand: ORTHOCORD HEALIX BR

## 2021-10-21 RX ORDER — LIDOCAINE HYDROCHLORIDE 20 MG/ML
INJECTION, SOLUTION EPIDURAL; INFILTRATION; INTRACAUDAL; PERINEURAL
Status: COMPLETED | OUTPATIENT
Start: 2021-10-21 | End: 2021-10-21

## 2021-10-21 RX ORDER — SUCCINYLCHOLINE CHLORIDE 20 MG/ML
INJECTION INTRAMUSCULAR; INTRAVENOUS AS NEEDED
Status: DISCONTINUED | OUTPATIENT
Start: 2021-10-21 | End: 2021-10-21 | Stop reason: SURG

## 2021-10-21 RX ORDER — ONDANSETRON 2 MG/ML
4 INJECTION INTRAMUSCULAR; INTRAVENOUS ONCE AS NEEDED
Status: DISCONTINUED | OUTPATIENT
Start: 2021-10-21 | End: 2021-10-21 | Stop reason: HOSPADM

## 2021-10-21 RX ORDER — BUPIVACAINE HYDROCHLORIDE 5 MG/ML
INJECTION, SOLUTION EPIDURAL; INTRACAUDAL
Status: COMPLETED | OUTPATIENT
Start: 2021-10-21 | End: 2021-10-21

## 2021-10-21 RX ORDER — PROMETHAZINE HYDROCHLORIDE 25 MG/1
25 SUPPOSITORY RECTAL ONCE AS NEEDED
Status: DISCONTINUED | OUTPATIENT
Start: 2021-10-21 | End: 2021-10-21 | Stop reason: HOSPADM

## 2021-10-21 RX ORDER — MIDAZOLAM HYDROCHLORIDE 2 MG/2ML
INJECTION, SOLUTION INTRAMUSCULAR; INTRAVENOUS AS NEEDED
Status: DISCONTINUED | OUTPATIENT
Start: 2021-10-21 | End: 2021-10-21 | Stop reason: SURG

## 2021-10-21 RX ORDER — KETOROLAC TROMETHAMINE 30 MG/ML
INJECTION, SOLUTION INTRAMUSCULAR; INTRAVENOUS AS NEEDED
Status: DISCONTINUED | OUTPATIENT
Start: 2021-10-21 | End: 2021-10-21 | Stop reason: SURG

## 2021-10-21 RX ORDER — LIDOCAINE HYDROCHLORIDE 20 MG/ML
INJECTION, SOLUTION INTRAVENOUS AS NEEDED
Status: DISCONTINUED | OUTPATIENT
Start: 2021-10-21 | End: 2021-10-21 | Stop reason: SURG

## 2021-10-21 RX ORDER — DEXAMETHASONE SODIUM PHOSPHATE 4 MG/ML
INJECTION, SOLUTION INTRA-ARTICULAR; INTRALESIONAL; INTRAMUSCULAR; INTRAVENOUS; SOFT TISSUE AS NEEDED
Status: DISCONTINUED | OUTPATIENT
Start: 2021-10-21 | End: 2021-10-21 | Stop reason: SURG

## 2021-10-21 RX ORDER — IPRATROPIUM BROMIDE AND ALBUTEROL SULFATE 2.5; .5 MG/3ML; MG/3ML
3 SOLUTION RESPIRATORY (INHALATION) ONCE AS NEEDED
Status: DISCONTINUED | OUTPATIENT
Start: 2021-10-21 | End: 2021-10-21 | Stop reason: HOSPADM

## 2021-10-21 RX ORDER — PROPOFOL 10 MG/ML
INJECTION, EMULSION INTRAVENOUS AS NEEDED
Status: DISCONTINUED | OUTPATIENT
Start: 2021-10-21 | End: 2021-10-21 | Stop reason: SURG

## 2021-10-21 RX ORDER — PROMETHAZINE HYDROCHLORIDE 25 MG/1
25 TABLET ORAL ONCE AS NEEDED
Status: DISCONTINUED | OUTPATIENT
Start: 2021-10-21 | End: 2021-10-21 | Stop reason: HOSPADM

## 2021-10-21 RX ORDER — LIDOCAINE HYDROCHLORIDE AND EPINEPHRINE 10; 10 MG/ML; UG/ML
INJECTION, SOLUTION INFILTRATION; PERINEURAL AS NEEDED
Status: DISCONTINUED | OUTPATIENT
Start: 2021-10-21 | End: 2021-10-21 | Stop reason: HOSPADM

## 2021-10-21 RX ORDER — SODIUM CHLORIDE, SODIUM LACTATE, POTASSIUM CHLORIDE, CALCIUM CHLORIDE 600; 310; 30; 20 MG/100ML; MG/100ML; MG/100ML; MG/100ML
1000 INJECTION, SOLUTION INTRAVENOUS CONTINUOUS
Status: DISCONTINUED | OUTPATIENT
Start: 2021-10-21 | End: 2021-10-21 | Stop reason: HOSPADM

## 2021-10-21 RX ORDER — NEOSTIGMINE METHYLSULFATE 5 MG/5 ML
SYRINGE (ML) INTRAVENOUS AS NEEDED
Status: DISCONTINUED | OUTPATIENT
Start: 2021-10-21 | End: 2021-10-21 | Stop reason: SURG

## 2021-10-21 RX ORDER — MEPERIDINE HYDROCHLORIDE 25 MG/ML
12.5 INJECTION INTRAMUSCULAR; INTRAVENOUS; SUBCUTANEOUS
Status: DISCONTINUED | OUTPATIENT
Start: 2021-10-21 | End: 2021-10-21 | Stop reason: HOSPADM

## 2021-10-21 RX ORDER — ONDANSETRON 2 MG/ML
INJECTION INTRAMUSCULAR; INTRAVENOUS AS NEEDED
Status: DISCONTINUED | OUTPATIENT
Start: 2021-10-21 | End: 2021-10-21 | Stop reason: SURG

## 2021-10-21 RX ORDER — CEFAZOLIN SODIUM 2 G/50ML
2 SOLUTION INTRAVENOUS
Status: COMPLETED | OUTPATIENT
Start: 2021-10-21 | End: 2021-10-21

## 2021-10-21 RX ORDER — ROCURONIUM BROMIDE 10 MG/ML
INJECTION, SOLUTION INTRAVENOUS AS NEEDED
Status: DISCONTINUED | OUTPATIENT
Start: 2021-10-21 | End: 2021-10-21 | Stop reason: SURG

## 2021-10-21 RX ADMIN — GLYCOPYRROLATE 0.4 MG: 0.2 INJECTION, SOLUTION INTRAMUSCULAR; INTRAVENOUS at 12:29

## 2021-10-21 RX ADMIN — SUCCINYLCHOLINE CHLORIDE 180 MG: 20 INJECTION, SOLUTION INTRAMUSCULAR; INTRAVENOUS at 10:20

## 2021-10-21 RX ADMIN — MIDAZOLAM HYDROCHLORIDE 2 MG: 1 INJECTION, SOLUTION INTRAMUSCULAR; INTRAVENOUS at 10:05

## 2021-10-21 RX ADMIN — SODIUM CHLORIDE, POTASSIUM CHLORIDE, SODIUM LACTATE AND CALCIUM CHLORIDE: 600; 310; 30; 20 INJECTION, SOLUTION INTRAVENOUS at 12:10

## 2021-10-21 RX ADMIN — KETOROLAC TROMETHAMINE 15 MG: 30 INJECTION, SOLUTION INTRAMUSCULAR at 10:44

## 2021-10-21 RX ADMIN — LIDOCAINE HYDROCHLORIDE 10 ML: 20 INJECTION, SOLUTION EPIDURAL; INFILTRATION; INTRACAUDAL; PERINEURAL at 10:04

## 2021-10-21 RX ADMIN — ONDANSETRON 4 MG: 2 INJECTION INTRAMUSCULAR; INTRAVENOUS at 10:44

## 2021-10-21 RX ADMIN — LIDOCAINE HYDROCHLORIDE 100 MG: 20 INJECTION, SOLUTION INTRAVENOUS at 10:20

## 2021-10-21 RX ADMIN — SODIUM CHLORIDE, POTASSIUM CHLORIDE, SODIUM LACTATE AND CALCIUM CHLORIDE 1000 ML: 600; 310; 30; 20 INJECTION, SOLUTION INTRAVENOUS at 09:11

## 2021-10-21 RX ADMIN — PROPOFOL 50 MG: 10 INJECTION, EMULSION INTRAVENOUS at 10:25

## 2021-10-21 RX ADMIN — FAMOTIDINE 20 MG: 10 INJECTION INTRAVENOUS at 09:11

## 2021-10-21 RX ADMIN — DEXAMETHASONE SODIUM PHOSPHATE 4 MG: 4 INJECTION, SOLUTION INTRAMUSCULAR; INTRAVENOUS at 10:44

## 2021-10-21 RX ADMIN — CEFAZOLIN SODIUM 2 G: 2 SOLUTION INTRAVENOUS at 10:27

## 2021-10-21 RX ADMIN — BUPIVACAINE HYDROCHLORIDE 20 ML: 5 INJECTION, SOLUTION EPIDURAL; INTRACAUDAL; PERINEURAL at 10:04

## 2021-10-21 RX ADMIN — Medication 3 MG: at 12:29

## 2021-10-21 RX ADMIN — PROPOFOL 150 MG: 10 INJECTION, EMULSION INTRAVENOUS at 10:20

## 2021-10-21 RX ADMIN — ROCURONIUM BROMIDE 20 MG: 10 INJECTION INTRAVENOUS at 10:31

## 2021-10-21 RX ADMIN — ROCURONIUM BROMIDE 10 MG: 10 INJECTION INTRAVENOUS at 10:20

## 2021-10-21 NOTE — DISCHARGE INSTRUCTIONS
Please follow all post op instructions and follow up appointment time from your physician's office included in your discharge packet.    REST TODAY    Use your ice pack as instructed, do not use continuously.  Use your sling as directed    Follow your physicians instructions as previously directed.    No pushing, pulling, tugging,  heavy lifting, or strenuous activity.  No major decision making, driving, or drinking alcoholic beverages for 24 hours. ( due to the medications you have  received)  Always use good hand hygiene/washing techniques.  NO driving while taking pain medications.    * if you have an incision:  Check your incision area every day for signs of infection.   Check for:  * more redness, swelling, or pain  *more fluid or blood  *warmth  *pus or bad smell    To assist you in voiding:  Drink plenty of fluids  Listen to running water while attempting to void.    If you are unable to urinate and you have an uncomfortable urge to void or it has been   6 hours since you were discharged, return to the Emergency Room

## 2021-10-21 NOTE — INTERVAL H&P NOTE
H&P reviewed. The patient was examined and there are no changes to the H&P.    Vitals:    10/21/21 0954   BP: 170/69   Pulse: 86   Resp: 17   Temp:    SpO2: 98%     Venancio Espinosa MD  10/21/2021  10:07 EDT

## 2021-10-21 NOTE — ANESTHESIA PROCEDURE NOTES
Peripheral Block      Patient reassessed immediately prior to procedure    Start time: 10/21/2021 9:45 AM  Stop time: 10/21/2021 10:00 AM  Reason for block: at surgeon's request and post-op pain management  Performed by  CRNA: Dipesh Vaz CRNA  Preanesthetic Checklist  Completed: patient identified, IV checked, site marked, risks and benefits discussed, surgical consent, monitors and equipment checked, pre-op evaluation and timeout performed  Prep:  Sterile barriers:cap, gloves, mask and sterile barriers  Prep: ChloraPrep  Patient monitoring: blood pressure monitoring, continuous pulse oximetry and EKG  Procedure    Sedation: yes    Guidance:ultrasound guided    ULTRASOUND INTERPRETATION. Using ultrasound guidance a gauge needle was placed in close proximity to the brachial plexus nerve, at which point, under ultrasound guidance anesthetic was injected in the area of the nerve and spread of the anesthesia was seen on ultrasound in close proximity thereto.  There were no abnormalities seen on ultrasound; a digital image was taken; and the patient tolerated the procedure with no complications. Images:still images obtained    Laterality:right  Block Type:interscalene  Injection Technique:single-shot  Needle Type:echogenic  Resistance on Injection: none    Medications Used: lidocaine PF (XYLOCAINE) injection 2 %, 10 mL  bupivacaine PF (MARCAINE) injection 0.5%, 20 mL      Medications  Comment:Adjuncts per total volume of LA:    Decadron 10 mg PSF      If required, intravenous sedation was given -- see meds on anesthesia record.    Post Assessment  Injection Assessment: negative aspiration for heme, no paresthesia on injection and incremental injection  Patient Tolerance:comfortable throughout block  Complications:no  Additional Notes      ++++++++++++++++++++++++++++++++++     Procedure:               SINGLE SHOT INTERSCALENE                                      Patient analgesia was achieved with IV  Sedation( see meds)     The pt was placed in semi-fowlers position with a slight tilt of the thorax contralateral to the insertion site.  The Insertion Site was prepped.  The skin was anesthetized with Lidocaine 1% 1ml injection utilizing a 25g needle.  Utilizing ultrasound guidance, an echogenic BBraun  needle was advanced in-plane.  Major vessels(carotid and Internal Jugular) were visualized as the brachial plexus was approached at the approximate level of C-7/ T-1.  Cervical 5 and Branches of Cervical 6 nerve roots were visualized and the needle tip was placed posterior at the level of C-6 roots.  LA spread was visualized and injection was made incrementally every 5 mls with aspiration. Injection pressure was normal or little, there was no intraneural injection, no vascular injection.

## 2021-10-21 NOTE — ANESTHESIA POSTPROCEDURE EVALUATION
Patient: Zbigniew Andrews    Procedure Summary     Date: 10/21/21 Room / Location: [facility] OR  /  [facility]    Anesthesia Start: 1009 Anesthesia Stop: 1249    Procedure: Shoulder diagnostic arthroscopy, synovectomy, labral debridement, biceps tenolysis subacromial decompression, bursectomy, distal clavicle excision and mini open rotator cuff repair. (Right Shoulder) Diagnosis:       Traumatic tear of right rotator cuff, unspecified tear extent, initial encounter      AC joint arthropathy      (Traumatic tear of right rotator cuff, unspecified tear extent, initial encounter [S46.011A])      (AC joint arthropathy [M19.019])    Surgeons: Venancio Espinosa MD Provider: Graciela Willett CRNA    Anesthesia Type: general with block ASA Status: 3          Anesthesia Type: general with block    Vitals  Vitals Value Taken Time   /81 10/21/21 1350   Temp 97.6 °F (36.4 °C) 10/21/21 1350   Pulse 82 10/21/21 1354   Resp 16 10/21/21 1350   SpO2 94 % 10/21/21 1354   Vitals shown include unvalidated device data.        Post Anesthesia Care and Evaluation    Patient location during evaluation: PHASE II  Patient participation: complete - patient participated  Level of consciousness: awake and alert  Pain score: 0  Pain management: satisfactory to patient  Airway patency: patent  Anesthetic complications: No anesthetic complications  PONV Status: none  Cardiovascular status: acceptable and stable  Respiratory status: acceptable  Hydration status: acceptable

## 2021-10-21 NOTE — OP NOTE
[address], [address]  [place]  [address] [phone]      OPERATIVE REPORT      PATIENT NAME:  Zbigniew Andrews                            YOB: 1955       PREOP DIAGNOSIS:   Right shoulder impingement, synovitis, labral tears, biceps tendinopathy, subacromial bursitis, and rotator cuff tear.    POSTOP DIAGNOSIS:  Same, plus high grade partial long head of biceps tears.    PROCEDURE:    Right shoulder diagnostic arthroscopy, synovectomy, labral debridement, biceps tenolysis, subacromial decompression bursectomy and mini-open rotator cuff repair.    SURGEON:     Obi Espinosa MD    OPERATIVE TEAM:   Circulator: Margot Sarmiento RN; Alejandro Walker RN; Mirian Woodson RN  Scrub Person: Lilo Nino; Kizzy Mccarty; Jeff Mcdonald  Other: Lydia Phillips RN    ANESTHETIST:  CRNA: Dipesh Vaz CRNA; Graciela Willett CRNA    ANESTHESIA:   General plus regional block.    ESTIM BLOOD LOSS:   25 ml    FINDINGS:     Medium to large size 3 cm moderately retracted  complete supraspinatus tears, high grade partial long head of biceps tear, extensive anterosuperior and posterosuperior labral degenerative tears, diffuse synovitis, moderate subacromial bursitis, type 1 horizontal acromion and hypertrophic acromioclavicular joint osteoarthritis with spurs.    SPECIMENS:    None.    IMPLANTS:      DePuy Healix BR 5.5 mm rotator cuff screw anchor loaded with two Orthocord sutures.    COMPLICATIONS:    None.    DISPOSITION:    Stable to recovery.     INDICATIONS:     Shoulder pain, stiffness, weakness and dysfunction.    NARRATIVE:     Risks, benefits of proposed treatment and alternative options discussed and an informed consent for the elective surgical procedure obtained.  Risks discussed including but not limited to anesthesia, infection, nerve/vessel/tendon injury, fracture, DVT, PE, recurrent tear and further symptoms or limitations.  Goals  outlined including the potential for relief of pain and improved shoulder function and activity tolerance.    Antibiotic prophylaxis was given.  Surgeon site marking and a time out were performed prior to the procedure.  Anesthesia was effective and well-tolerated.  The patient was maintained in the modified reclined beach chair position with care taken to pad all areas and keep the away arm at and above the level of the atrium for DVT prophylaxis.  The shoulder, arm and hand was prepped and draped in the usual sterile fashion.  At the start of the procedure, a local 1% lidocaine with epinephrine injection was given at the portal sites.    Portal sites were made for the arthroscopic portion of the procedure.  Evaluation of the glenohumeral joint space revealed moderate degenerative changes, fairly extensive diffuse synovitis, large anterosuperior and posterosuperior degenerative labral tears, high grade partial long head of biceps tendon tears with extensive fraying and narrowing of the tendon from the superior glenoid origin to the bicipital groove, medium to large 3 - 3.5 cm size rotator cuff supraspinatus tears with moderate retraction, intact subscapularis tendon, intact middle and inferior glenohumeral ligaments, no Bankart lesion, no Hill-Sachs deformity and no loose bodies.  At the subacromial space, there was considerable subacromial bursitis, type 1 acromion morphology and hypertrophic acromioclavicular joint arthritis with impingement spurs.    Treatment consisted of shoulder diagnostic assessment, synovectomy and labral debridement with a 3.5 mm full radius shaver, biceps tenolysis with arthroscopic biters and the shaver, subacromial decompression bursectomy with the shaver, and distal clavicle excision with the shaver and a 4.0 mm acromionizer jarocho.  Minor bursal bleeding was well controlled with arthroscopic cautery.  Then a mini-open approach in line with the deltoid fibers permitted good visualization  of the rotator cuff tears.  The outlet was assessed again and well decompressed.  The tendon margin was freshened to a viable edge, the tendon mobilized with a Arciniega freer, and the greater tuberosity trough prepared with a rongeur and a bone file.  Using a suture anchor system, a secure complete repair of the rotator cuff tendon to the greater tuberosity insertion bone was achieved.  The main repair was reinforced with #1 Vicryl suture.  Representative arthroscopic photos were saved throughout the diagnostic assessment and arthroscopic procedure steps.  At the end of the procedure, the shoulder was irrigated well and suctioned clear.  Routine closure of the portal sites and mini-open incision were performed using #1 Vicryl for the deltoid fascia, 2-0 Vicryl for the subcutaneous layer and staples for the skin.  A sterile Aquacel dressing was applied and a shoulder immobilizer placed for support, protection and comfort.   Anesthesia was effective and well tolerated.  There were no complications of the procedure. The patient was transferred stable to recovery.

## 2021-10-21 NOTE — ANESTHESIA PREPROCEDURE EVALUATION
Anesthesia Evaluation     Patient summary reviewed and Nursing notes reviewed   no history of anesthetic complications:  NPO Solid Status: > 8 hours  NPO Liquid Status: > 8 hours           Airway   Mallampati: I  TM distance: >3 FB  Neck ROM: full  no difficulty expected  Dental - normal exam     Pulmonary - normal exam   (+) sleep apnea on CPAP,   Cardiovascular - normal exam    (+) hypertension,       Neuro/Psych- negative ROS  GI/Hepatic/Renal/Endo - negative ROS     Musculoskeletal (-) negative ROS    Abdominal    Substance History - negative use     OB/GYN negative ob/gyn ROS         Other - negative ROS                       Anesthesia Plan    ASA 3     general with block     intravenous induction     Anesthetic plan, all risks, benefits, and alternatives have been provided, discussed and informed consent has been obtained with: patient.

## 2021-10-21 NOTE — ANESTHESIA PROCEDURE NOTES
Airway  Urgency: elective    Date/Time: 10/21/2021 10:22 AM  Airway not difficult    General Information and Staff    Patient location during procedure: OR  CRNA: Graciela Willett CRNA    Indications and Patient Condition  Indications for airway management: airway protection    Preoxygenated: yes  MILS not maintained throughout  Mask difficulty assessment: 2 - vent by mask + OA or adjuvant +/- NMBA    Final Airway Details  Final airway type: endotracheal airway      Successful airway: ETT  Cuffed: yes   Successful intubation technique: direct laryngoscopy  Facilitating devices/methods: intubating stylet  Endotracheal tube insertion site: oral  Blade: Richelle  Blade size: 3  ETT size (mm): 7.5  Cormack-Lehane Classification: grade IIb - view of arytenoids or posterior of glottis only  Placement verified by: chest auscultation and capnometry   Cuff volume (mL): 8  Measured from: lips  ETT/EBT  to lips (cm): 23  Number of attempts at approach: 1  Assessment: lips, teeth, and gum same as pre-op and atraumatic intubation    Additional Comments  Negative epigastric sounds, Breath sound equal bilaterally with symmetric chest rise and fall

## 2021-11-04 ENCOUNTER — OFFICE VISIT (OUTPATIENT)
Dept: ORTHOPEDIC SURGERY | Facility: CLINIC | Age: 66
End: 2021-11-04

## 2021-11-04 VITALS — TEMPERATURE: 97.5 F | HEIGHT: 68 IN | BODY MASS INDEX: 47.74 KG/M2 | WEIGHT: 315 LBS

## 2021-11-04 DIAGNOSIS — Z98.890 S/P ROTATOR CUFF SURGERY: Primary | ICD-10-CM

## 2021-11-04 PROCEDURE — 99024 POSTOP FOLLOW-UP VISIT: CPT | Performed by: PHYSICIAN ASSISTANT

## 2021-11-04 NOTE — PROGRESS NOTES
Subjective   Patient ID: Zbigniew Andrews is a 66 y.o. right hand dominant male is here today for a post-operative visit.  Post-op of the Right Shoulder (Diagnostic arthroscopy, synovectomy, labral debridement, biceps tenolysis subacromial decompression, bursectomy, distal clavicle excision and mini open rotator cuff repair. States it is feeling a lot better.)          CHIEF COMPLAINT:      History of Present Illness      Pain controlled: [] no   [x] yes   Medication refill requested: [x] no   [] yes    Patient compliant with instructions: [] no   [x] yes   Other: Reports good progress since surgery.     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         Past Surgical History:   Procedure Laterality Date   • COLONOSCOPY     • ENDOSCOPY     • GASTRIC SLEEVE LAPAROSCOPIC     • HERNIA REPAIR     • PYLOROMYOTOMY  1954   • SHOULDER ARTHROSCOPY W/ ROTATOR CUFF REPAIR Right 10/21/2021    Procedure: Shoulder diagnostic arthroscopy, synovectomy, labral debridement, biceps tenolysis subacromial decompression, bursectomy, distal clavicle excision and mini open rotator cuff repair.;  Surgeon: Venancio Espinosa MD;  Location: [facility];  Service: Orthopedics;  Laterality: Right;   • SKIN BIOPSY     • UMBILICAL HERNIA REPAIR  2000   • VENA CAVA FILTER INSERTION         No Known Allergies    Review of Systems   Constitutional: Negative for fever.   HENT: Negative for dental problem and voice change.    Eyes: Negative for visual disturbance.   Respiratory: Negative for shortness of breath.    Cardiovascular: Negative for chest pain.   Gastrointestinal: Negative for abdominal pain.   Genitourinary:  "Negative for dysuria.   Musculoskeletal: Positive for arthralgias (right shoulder). Negative for gait problem and joint swelling.   Skin: Negative for rash.   Neurological: Negative for speech difficulty.   Hematological: Does not bruise/bleed easily.   Psychiatric/Behavioral: Negative for confusion.     I have reviewed the medical and surgical history, family history, social history, medications, and/or allergies, and the review of systems of this report.    Objective   Temp 97.5 °F (36.4 °C)   Ht 172.7 cm (67.99\")   Wt (!) 159 kg (350 lb)   BMI 53.23 kg/m²       Signs of infection: [x] no                    [] yes   Drainage: [x] no                    [] yes   Incision: [x] healing well     []healed well   Motor exam intact: [] no                    [x] yes   Neurovascular exam intact: [] no                    [x] yes   Signs of compartment syndrome: [x] no                    [] yes   Signs of DVT: [x] no                    [] yes   Other:      Physical Exam  Ortho Exam    Extremity DVT signs are negative by clinical screen.  Neurologic Exam    Assessment/Plan     Independent Review of Radiographic Studies:    No new imaging done today.    Laboratory and Other Studies:  No new results reviewed today.     Medical Decision Making:    Stable neurovascular exam.     Procedures     Diagnoses and all orders for this visit:    1. S/P rotator cuff surgery (Primary)  -     Ambulatory Referral to Physical Therapy POST OP, Ortho         Recommendations/Plan:     Sutures Staples or Pins [x] Removed today  [] At prior visit  [] Plan removal later   Physical therapy: []rehab facility  [x]outpatient referral  [] therapy ongoing   Ultrasound: [x]not ordered         []order given to patient   Labs: [x]not ordered         []order given to patient   Weight Bearing status: []Full []WBAT []PWB [x]NWB []Other     Ice, heat, and/or modalities as beneficial  Watch for signs and symptoms of infection  Reduced physical activity as " appropriate and avoid offending activity  Weight bearing parameters reviewed  Physical therapy referral given     Patient was provided rehab protocol specific for medium to large rotator cuff repair     Follow-up in 7 weeks or as needed    Patient is encouraged and agreeable to call or return sooner for any issues or concerns.

## 2021-12-28 ENCOUNTER — OFFICE VISIT (OUTPATIENT)
Dept: ORTHOPEDIC SURGERY | Facility: CLINIC | Age: 66
End: 2021-12-28

## 2021-12-28 VITALS — BODY MASS INDEX: 47.74 KG/M2 | TEMPERATURE: 96.8 F | HEIGHT: 68 IN | WEIGHT: 315 LBS

## 2021-12-28 DIAGNOSIS — Z98.890 S/P ROTATOR CUFF SURGERY: Primary | ICD-10-CM

## 2021-12-28 PROCEDURE — 99024 POSTOP FOLLOW-UP VISIT: CPT | Performed by: PHYSICIAN ASSISTANT

## 2021-12-28 NOTE — PROGRESS NOTES
Subjective   Patient ID: Zbigniew Andrews is a 66 y.o. right hand dominant male is here today for a post-operative visit.  Post-op of the Right Shoulder (States it is doing well, currently in PT. His physical therapist told him he is ahead of schedule as far as movement.)          CHIEF COMPLAINT:    Patient still attends formal therapy and does some therapy at home.  History of Present Illness      Pain controlled: [] no   [x] yes   Medication refill requested: [x] no   [] yes    Patient compliant with instructions: [] no   [x] yes   Other: Reports good progress since surgery.  The patient reports that both he and his therapist feel like he is progressing well.     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         Past Surgical History:   Procedure Laterality Date   • COLONOSCOPY     • ENDOSCOPY     • GASTRIC SLEEVE LAPAROSCOPIC     • HERNIA REPAIR     • PYLOROMYOTOMY  1954   • SHOULDER ARTHROSCOPY W/ ROTATOR CUFF REPAIR Right 10/21/2021    Procedure: Shoulder diagnostic arthroscopy, synovectomy, labral debridement, biceps tenolysis subacromial decompression, bursectomy, distal clavicle excision and mini open rotator cuff repair.;  Surgeon: Venancio Espinosa MD;  Location: [facility];  Service: Orthopedics;  Laterality: Right;   • SKIN BIOPSY     • UMBILICAL HERNIA REPAIR  2000   • VENA CAVA FILTER INSERTION         No Known Allergies    Review of Systems   Constitutional: Negative for fever.   HENT: Negative for dental problem and voice change.    Eyes: Negative for visual disturbance.   Respiratory: Negative for shortness of breath.    Cardiovascular: Negative for chest pain.  "  Gastrointestinal: Negative for abdominal pain.   Genitourinary: Negative for dysuria.   Musculoskeletal: Positive for arthralgias (right shoulder). Negative for gait problem and joint swelling.   Skin: Negative for rash.   Neurological: Negative for speech difficulty.   Hematological: Does not bruise/bleed easily.   Psychiatric/Behavioral: Negative for confusion.     I have reviewed the medical and surgical history, family history, social history, medications, and/or allergies, and the review of systems of this report.    Objective   Temp 96.8 °F (36 °C)   Ht 172.7 cm (67.99\")   Wt (!) 161 kg (354 lb 9.6 oz)   BMI 53.93 kg/m²       Signs of infection: [x] no                    [] yes   Drainage: [x] no                    [] yes   Incision: [x] healing well     []healed well   Motor exam intact: [] no                    [x] yes   Neurovascular exam intact: [] no                    [x] yes   Signs of compartment syndrome: [x] no                    [] yes   Signs of DVT: [x] no                    [] yes   Other:      Physical Exam  Right Shoulder Exam     Range of Motion   Right shoulder active abduction: 125.   Passive abduction: 130   Right shoulder forward flexion: 128.   Internal rotation 0 degrees: Sacrum     Other   Sensation: normal  Pulse: present            No physical exam findings of the right shoulder to suggest adhesive capsulitis.    Extremity DVT signs are negative by clinical screen.  Neurologic Exam    Assessment/Plan     Independent Review of Radiographic Studies:    No new imaging done today.    Laboratory and Other Studies:  No new results reviewed today.     Medical Decision Making:    Stable neurovascular exam.     Procedures     Diagnoses and all orders for this visit:    1. S/P rotator cuff surgery (Primary)         Recommendations/Plan:     Sutures Staples or Pins [] Removed today  [x] At prior visit  [] Plan removal later   Physical therapy: []rehab facility  []outpatient referral  [x] " therapy ongoing   Ultrasound: [x]not ordered         []order given to patient   Labs: [x]not ordered         []order given to patient   Weight Bearing status: []Full []WBAT [x]PWB []NWB []Other     Discussion of orthopaedic goals and activities and patient and/or guardian expressed appreciation.  Regular exercise as tolerated  Guided on proper techniques for mobility, strength, agility and/or conditioning exercises  Weight bearing parameters reviewed  Take prescribed medications as instructed only as tolerated     Continue formal physical therapy.  Use warm heat at home prior to your home therapy exercises.  Follow-up in 8 weeks or sooner if needed  Patient is encouraged and agreeable to call or return sooner for any issues or concerns.

## 2022-02-22 ENCOUNTER — OFFICE VISIT (OUTPATIENT)
Dept: ORTHOPEDIC SURGERY | Facility: CLINIC | Age: 67
End: 2022-02-22

## 2022-02-22 VITALS — WEIGHT: 315 LBS | BODY MASS INDEX: 47.74 KG/M2 | HEIGHT: 68 IN | TEMPERATURE: 97.8 F

## 2022-02-22 DIAGNOSIS — Z98.890 S/P ARTHROSCOPY OF RIGHT SHOULDER: Primary | ICD-10-CM

## 2022-02-22 PROCEDURE — 99212 OFFICE O/P EST SF 10 MIN: CPT | Performed by: PHYSICIAN ASSISTANT

## 2022-02-22 NOTE — PROGRESS NOTES
Subjective   Patient ID: Zbigniew Andrews is a 66 y.o. right hand dominant male  Follow-up of the Right Shoulder (S/P right shoulder diagnostic arthroscopy on 10/21/21. Patient states shoulder is doing well, he is still in physical therapy @ [facility] twice weekly. )         History of Present Illness  Patient is following up for scheduled appointment regarding right shoulder arthroscopy with rotator cuff repair. He attends physical therapy at [facility] twice weekly. He reports he is doing well.  Occasionally, he mentions some soreness to the right upper arm that occurs after therapy.                                                 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         Past Surgical History:   Procedure Laterality Date   • COLONOSCOPY     • ENDOSCOPY     • GASTRIC SLEEVE LAPAROSCOPIC     • HERNIA REPAIR     • PYLOROMYOTOMY  1954   • SHOULDER ARTHROSCOPY W/ ROTATOR CUFF REPAIR Right 10/21/2021    Procedure: Shoulder diagnostic arthroscopy, synovectomy, labral debridement, biceps tenolysis subacromial decompression, bursectomy, distal clavicle excision and mini open rotator cuff repair.;  Surgeon: Venancio Espinosa MD;  Location: [facility];  Service: Orthopedics;  Laterality: Right;   • SKIN BIOPSY     • UMBILICAL HERNIA REPAIR  2000   • VENA CAVA FILTER INSERTION         Family History   Problem Relation Age of Onset   • Hypertension Mother    • Cancer Mother    • Arthritis Mother    • Asthma Mother    • Ovarian cancer Mother    • Hypertension Father    • Heart disease Father    • Arthritis Father    • Kidney disease Father    • Alzheimer's disease Father    • Kidney failure Father    •  Arthritis Sister    • Arthritis Brother    • Hypertension Brother    • Hepatitis Brother    • Atrial fibrillation Brother        Social History     Socioeconomic History   • Marital status:    Tobacco Use   • Smoking status: Never Smoker   • Smokeless tobacco: Never Used   Vaping Use   • Vaping Use: Never used   Substance and Sexual Activity   • Alcohol use: Not Currently     Comment: Social, quit [id number]   • Drug use: Never   • Sexual activity: Defer         Current Outpatient Medications:   •  Ascorbic Acid (VITAMIN C PO), Take  by mouth Daily., Disp: , Rfl:   •  Cholecalciferol 50 MCG (2000 UT) tablet, Take 4,000 Units by mouth Daily., Disp: , Rfl:   •  Cinnamon 500 MG capsule, Take 500 mg by mouth Daily., Disp: , Rfl:   •  donepezil (ARICEPT) 10 MG tablet, Take 10 mg by mouth Daily., Disp: , Rfl:   •  fluticasone (FLONASE) 50 MCG/ACT nasal spray, 2 sprays into the nostril(s) as directed by provider Daily., Disp: , Rfl:   •  gabapentin (NEURONTIN) 600 MG tablet, Take 600 mg by mouth 2 (Two) Times a Day., Disp: , Rfl:   •  Glucosamine HCl (GLUCOSAMINE PO), Take  by mouth 2 (two) times a day., Disp: , Rfl:   •  guaiFENesin (Mucinex) 600 MG 12 hr tablet, Take 400 mg by mouth Daily As Needed., Disp: , Rfl:   •  levocetirizine (XYZAL) 5 MG tablet, Take 5 mg by mouth Every Evening., Disp: , Rfl:   •  losartan (COZAAR) 50 MG tablet, Take 1 tablet by mouth Daily. (Patient taking differently: Take 25 mg by mouth Daily.), Disp: 90 tablet, Rfl: 3  •  methocarbamol (Robaxin) 500 MG tablet, Take 500 mg by mouth 3 (Three) Times a Day., Disp: , Rfl:   •  Misc Natural Products (BLACK CHERRY CONCENTRATE PO), Take  by mouth 2 (two) times a day., Disp: , Rfl:   •  Multiple Vitamins-Minerals (ZINC PO), Take  by mouth., Disp: , Rfl:   •  multivitamin (MULTIPLE VITAMIN PO), Daily., Disp: , Rfl:   •  mupirocin (BACTROBAN) 2 % ointment, Apply to affected area bid-tid, Disp: 30 g, Rfl: 0  •  niacin 500 MG tablet, Take 1,000 mg by  "mouth 2 (Two) Times a Day With Meals., Disp: , Rfl:   •  NON FORMULARY, , Disp: , Rfl:   •  nystatin (nystatin) 579113 UNIT/GM powder, Apply  topically to the appropriate area as directed As Needed., Disp: , Rfl:   •  pyridoxine (VITAMIN B-6) 100 MG tablet, Take 1 tablet by mouth Daily., Disp: , Rfl:   •  sertraline (ZOLOFT) 25 MG tablet, Take 25 mg by mouth Daily., Disp: , Rfl:   •  sulfamethoxazole-trimethoprim (BACTRIM DS,SEPTRA DS) 800-160 MG per tablet, 1 po bid, Disp: 14 tablet, Rfl: 0  •  tamsulosin (FLOMAX) 0.4 MG capsule 24 hr capsule, Take 1 capsule by mouth Daily., Disp: , Rfl:   •  Turmeric (QC TUMERIC COMPLEX PO), Take  by mouth Daily., Disp: , Rfl:     No Known Allergies    Review of Systems   Constitutional: Negative for fever.   HENT: Negative for dental problem and voice change.    Eyes: Negative for visual disturbance.   Respiratory: Negative for shortness of breath.    Cardiovascular: Negative for chest pain.   Gastrointestinal: Negative for abdominal pain.   Genitourinary: Negative for dysuria.   Musculoskeletal: Positive for arthralgias (occasional throbbing right upper arm). Negative for gait problem and joint swelling.   Skin: Negative for rash.   Neurological: Negative for speech difficulty.   Hematological: Does not bruise/bleed easily.   Psychiatric/Behavioral: Negative for confusion.       I have reviewed the medical and surgical history, family history, social history, medications, and/or allergies, and the review of systems of this report.    Objective   Temp 97.8 °F (36.6 °C)   Ht 172.7 cm (68\")   Wt (!) 161 kg (355 lb)   BMI 53.98 kg/m²    Physical Exam  Vitals and nursing note reviewed.   Constitutional:       Appearance: Normal appearance. He is obese.   Pulmonary:      Effort: Pulmonary effort is normal.   Neurological:      Mental Status: He is alert and oriented to person, place, and time.       Right Shoulder Exam     Range of Motion   Right shoulder active abduction: 130. "   Passive abduction: 140   Right shoulder forward flexion: 135.     Tests   Drop arm: negative    Other   Sensation: normal             Neurologic Exam     Mental Status   Oriented to person, place, and time.              Assessment/Plan   Independent Review of Radiographic Studies:    No new imaging done today.      Procedures       Diagnoses and all orders for this visit:    1. S/P arthroscopy of right shoulder (Primary)       Orthopedic activities reviewed and patient expressed appreciation  Discussion of orthopedic goals  Risk, benefits, and merits of treatment alternatives reviewed with the patient and questions answered    Recommendations/Plan:  Exercise, medications, injections, other patient advice, and return appointment as noted.  Patient is encouraged to call or return for any issues or concerns.    Instructed the patient to use warm heat to the right upper arm. He may use this every evening for 15 to 20 minutes. Follow-up in 8 weeks or sooner if needed. Continue formal physical therapy.    Patient agreeable to call or return sooner for any concerns.               EMR Dragon-transcription disclaimer:  This encounter note is an electronic transcription of spoken language to printed text.  Electronic transcription of spoken language may permit erroneous or at times nonsensical words or phrases to be inadvertently transcribed.  Although I have reviewed the note for such errors, some may still exist

## 2022-04-06 ENCOUNTER — OFFICE VISIT (OUTPATIENT)
Dept: CARDIOLOGY | Facility: CLINIC | Age: 67
End: 2022-04-06

## 2022-04-06 VITALS
RESPIRATION RATE: 18 BRPM | BODY MASS INDEX: 47.74 KG/M2 | HEART RATE: 62 BPM | DIASTOLIC BLOOD PRESSURE: 80 MMHG | SYSTOLIC BLOOD PRESSURE: 160 MMHG | OXYGEN SATURATION: 99 % | HEIGHT: 68 IN | WEIGHT: 315 LBS

## 2022-04-06 DIAGNOSIS — E66.01 MORBID OBESITY WITH BMI OF 50.0-59.9, ADULT: ICD-10-CM

## 2022-04-06 DIAGNOSIS — I10 PRIMARY HYPERTENSION: ICD-10-CM

## 2022-04-06 DIAGNOSIS — R07.9 CHEST PAIN, UNSPECIFIED TYPE: Primary | ICD-10-CM

## 2022-04-06 PROCEDURE — 99214 OFFICE O/P EST MOD 30 MIN: CPT | Performed by: INTERNAL MEDICINE

## 2022-04-06 RX ORDER — AMLODIPINE BESYLATE 5 MG/1
5 TABLET ORAL DAILY
Qty: 90 TABLET | Refills: 3 | Status: SHIPPED | OUTPATIENT
Start: 2022-04-06 | End: 2022-10-12 | Stop reason: SDUPTHER

## 2022-04-06 RX ORDER — TRAMADOL HYDROCHLORIDE 50 MG/1
TABLET ORAL EVERY 8 HOURS PRN
COMMUNITY
Start: 2022-03-16

## 2022-04-06 NOTE — PROGRESS NOTES
"             [facility] Cardiology Office Follow Up Note    Zbigniew Andrews  [id number]  2022    Primary Care Provider: Katina Cote APRN    Chief Complaint: Routine follow-up    History of Present Illness:   Mr. Zbigniew Andrews is a 67 y.o. male who presents to the Cardiology Clinic for routine follow-up.  The patient has a past medical history significant for hypertension, gout, BPH, and obesity with a BMI 54 kg/m².  He does not have any significant past cardiac history.  He reports undergoing ischemic evaluation including a coronary angiogram in [place] in , with no significant CAD at that time.  He presents today for routine follow-up.  Since his last appointment, the patient has had significant improvement in his right shoulder pain after completing therapy for a rotator cuff injury.  He continues to have occasional episodes of chest pain described as a \"dull\" discomfort which is associated with deep respirations.  He believes his chest discomfort is related to a prior traumatic back injury.  He denies any chest pain or chest discomfort with exertion.  No associated nausea, vomiting, or diaphoresis.  At the time of his last appointment, his losartan was decreased due to orthostatic hypotension.  Since decreasing his losartan, he has noted an upward trend in his systolic BP, with his PA systolic BP being in the 140s-150s on home BP checks.  He does, however, report significant improvement in his orthostatic symptoms.  No other specific complaints today.      Past Cardiac Testin. Last Coronary Angio: , reportedly no significant CAD  2. Prior Stress Testing: None known  3. Last Echo: Records pending  4. Prior Holter Monitor: None    Review of Systems:   Review of Systems   Constitutional: Negative for activity change, appetite change, chills, diaphoresis, fatigue, fever, unexpected weight gain and unexpected weight loss.   Eyes: Negative for blurred vision and double vision. "   Respiratory: Negative for cough, chest tightness, shortness of breath and wheezing.    Cardiovascular: Positive for chest pain. Negative for palpitations and leg swelling.   Gastrointestinal: Negative for abdominal pain, anal bleeding, blood in stool and GERD.   Endocrine: Negative for cold intolerance and heat intolerance.   Genitourinary: Negative for hematuria.   Neurological: Negative for dizziness, syncope, weakness and light-headedness.   Hematological: Does not bruise/bleed easily.   Psychiatric/Behavioral: Negative for depressed mood and stress. The patient is not nervous/anxious.        I have reviewed and/or updated the patient's past medical, past surgical, family, social history, problem list and allergies as appropriate.     Medications:     Current Outpatient Medications:   •  Ascorbic Acid (VITAMIN C PO), Take  by mouth Daily., Disp: , Rfl:   •  Cholecalciferol 50 MCG (2000 UT) tablet, Take 4,000 Units by mouth Daily., Disp: , Rfl:   •  Cinnamon 500 MG capsule, Take 500 mg by mouth Daily., Disp: , Rfl:   •  donepezil (ARICEPT) 10 MG tablet, Take 10 mg by mouth Daily., Disp: , Rfl:   •  fluticasone (FLONASE) 50 MCG/ACT nasal spray, 2 sprays into the nostril(s) as directed by provider Daily., Disp: , Rfl:   •  gabapentin (NEURONTIN) 600 MG tablet, Take 600 mg by mouth 2 (Two) Times a Day., Disp: , Rfl:   •  Glucosamine HCl (GLUCOSAMINE PO), Take  by mouth 2 (two) times a day., Disp: , Rfl:   •  guaiFENesin (Mucinex) 600 MG 12 hr tablet, Take 400 mg by mouth Daily As Needed., Disp: , Rfl:   •  levocetirizine (XYZAL) 5 MG tablet, Take 5 mg by mouth Every Evening., Disp: , Rfl:   •  losartan (COZAAR) 50 MG tablet, Take 1 tablet by mouth Daily., Disp: 90 tablet, Rfl: 3  •  methocarbamol (ROBAXIN) 500 MG tablet, Take 500 mg by mouth 3 (Three) Times a Day., Disp: , Rfl:   •  Misc Natural Products (BLACK CHERRY CONCENTRATE PO), Take  by mouth 2 (two) times a day., Disp: , Rfl:   •  Multiple Vitamins-Minerals  "(ZINC PO), Take  by mouth., Disp: , Rfl:   •  multivitamin (THERAGRAN) tablet tablet, Daily., Disp: , Rfl:   •  mupirocin (BACTROBAN) 2 % ointment, Apply to affected area bid-tid, Disp: 30 g, Rfl: 0  •  niacin 500 MG tablet, Take 1,000 mg by mouth 2 (Two) Times a Day With Meals., Disp: , Rfl:   •  NON FORMULARY, , Disp: , Rfl:   •  nystatin (MYCOSTATIN) 915139 UNIT/GM powder, Apply  topically to the appropriate area as directed As Needed., Disp: , Rfl:   •  pyridoxine (VITAMIN B-6) 100 MG tablet, Take 1 tablet by mouth Daily., Disp: , Rfl:   •  sertraline (ZOLOFT) 25 MG tablet, Take 25 mg by mouth Daily., Disp: , Rfl:   •  tamsulosin (FLOMAX) 0.4 MG capsule 24 hr capsule, Take 1 capsule by mouth Daily., Disp: , Rfl:   •  traMADol (ULTRAM) 50 MG tablet, Every 8 (Eight) Hours As Needed., Disp: , Rfl:   •  Turmeric (QC TUMERIC COMPLEX PO), Take  by mouth Daily., Disp: , Rfl:   •  amLODIPine (NORVASC) 5 MG tablet, Take 1 tablet by mouth Daily., Disp: 90 tablet, Rfl: 3  •  sulfamethoxazole-trimethoprim (BACTRIM DS,SEPTRA DS) 800-160 MG per tablet, 1 po bid, Disp: 14 tablet, Rfl: 0    Physical Exam:  Vital Signs:   Vitals:    04/06/22 1314   BP: 160/80   BP Location: Right arm   Patient Position: Sitting   Pulse: 62   Resp: 18   SpO2: 99%   Weight: (!) 161 kg (354 lb)   Height: 172.7 cm (68\")       Physical Exam  Constitutional:       General: He is not in acute distress.     Appearance: Normal appearance. He is not diaphoretic.   HENT:      Head: Normocephalic and atraumatic.   Cardiovascular:      Rate and Rhythm: Normal rate and regular rhythm.      Heart sounds: No murmur heard.  Pulmonary:      Effort: Pulmonary effort is normal. No respiratory distress.      Breath sounds: Normal breath sounds. No stridor. No wheezing, rhonchi or rales.   Abdominal:      General: Bowel sounds are normal. There is no distension.      Palpations: Abdomen is soft.      Tenderness: There is no abdominal tenderness. There is no guarding " or rebound.   Musculoskeletal:         General: No swelling. Normal range of motion.      Cervical back: Neck supple. No tenderness.   Skin:     General: Skin is warm and dry.   Neurological:      General: No focal deficit present.      Mental Status: He is alert and oriented to person, place, and time.   Psychiatric:         Mood and Affect: Mood normal.         Behavior: Behavior normal.         Results Review:   I reviewed the patient's new clinical results.       Assessment / Plan:     1. Chest pain  --No significant past cardiac history, coronary angiogram in 2012 no significant CAD at that time  --Prior ECG without evidence of acute or prior ischemia  --Current episodes of chest pain remain noncardiac in character, likely musculoskeletal etiology  --Given chest discomfort remains noncardiac in character, no indication for further ischemic work-up at this time     2.  Hypertension  --Hypertensive today with systolic BP in 160s  --Orthostatic vitals positive today, however asymptomatic and hypertensive  --Continue current dose of losartan  --Will add low-dose Norvasc     3. Morbid obesity with BMI of 50.0-59.9  --Recommend weight loss through diet and exercise      Follow Up:   Return in about 6 months (around 10/6/2022).      Thank you for allowing me to participate in the care of your patient. Please to not hesitate to contact me with additional questions or concerns.     ELEAZAR Chavarria MD  Interventional Cardiology   04/06/2022  13:13 EDT

## 2022-04-20 ENCOUNTER — OFFICE VISIT (OUTPATIENT)
Dept: ORTHOPEDIC SURGERY | Facility: CLINIC | Age: 67
End: 2022-04-20

## 2022-04-20 VITALS — TEMPERATURE: 98.2 F | HEIGHT: 68 IN | BODY MASS INDEX: 47.74 KG/M2 | WEIGHT: 315 LBS

## 2022-04-20 DIAGNOSIS — Z98.890 S/P ARTHROSCOPY OF RIGHT SHOULDER: Primary | ICD-10-CM

## 2022-04-20 DIAGNOSIS — Z98.890 S/P ROTATOR CUFF SURGERY: ICD-10-CM

## 2022-04-20 PROCEDURE — 99212 OFFICE O/P EST SF 10 MIN: CPT | Performed by: PHYSICIAN ASSISTANT

## 2022-04-20 NOTE — PROGRESS NOTES
Subjective   Patient ID: Zbigniew Andrews is a 67 y.o. right hand dominant male  Follow-up of the Right Shoulder (S/P shoulder arthroscopy on 10/21/21. He states he is doing well, has finished outpatient therapy, doing home exercise program. )         History of Present Illness  Patient is following up for right shoulder ATS.   He reports he is doing great. Reporting he is doing everything that he would like to do without any complications.                                                 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         Past Surgical History:   Procedure Laterality Date   • COLONOSCOPY     • ENDOSCOPY     • GASTRIC SLEEVE LAPAROSCOPIC     • HERNIA REPAIR     • PYLOROMYOTOMY  1954   • SHOULDER ARTHROSCOPY W/ ROTATOR CUFF REPAIR Right 10/21/2021    Procedure: Shoulder diagnostic arthroscopy, synovectomy, labral debridement, biceps tenolysis subacromial decompression, bursectomy, distal clavicle excision and mini open rotator cuff repair.;  Surgeon: Venancio Espinosa MD;  Location: [facility];  Service: Orthopedics;  Laterality: Right;   • SKIN BIOPSY     • UMBILICAL HERNIA REPAIR  2000   • VENA CAVA FILTER INSERTION         Family History   Problem Relation Age of Onset   • Hypertension Mother    • Cancer Mother    • Arthritis Mother    • Asthma Mother    • Ovarian cancer Mother    • Hypertension Father    • Heart disease Father    • Arthritis Father    • Kidney disease Father    • Alzheimer's disease Father    • Kidney failure Father    • Arthritis Sister    • Arthritis Brother    • Hypertension Brother    • Hepatitis Brother    • Atrial fibrillation Brother        Social  History     Socioeconomic History   • Marital status:    Tobacco Use   • Smoking status: Never Smoker   • Smokeless tobacco: Never Used   Vaping Use   • Vaping Use: Never used   Substance and Sexual Activity   • Alcohol use: Not Currently     Comment: Social, quit [id number]   • Drug use: Never   • Sexual activity: Defer         Current Outpatient Medications:   •  amLODIPine (NORVASC) 5 MG tablet, Take 1 tablet by mouth Daily., Disp: 90 tablet, Rfl: 3  •  Ascorbic Acid (VITAMIN C PO), Take  by mouth Daily., Disp: , Rfl:   •  Cholecalciferol 50 MCG (2000 UT) tablet, Take 4,000 Units by mouth Daily., Disp: , Rfl:   •  Cinnamon 500 MG capsule, Take 500 mg by mouth Daily., Disp: , Rfl:   •  donepezil (ARICEPT) 10 MG tablet, Take 10 mg by mouth Daily., Disp: , Rfl:   •  fluticasone (FLONASE) 50 MCG/ACT nasal spray, 2 sprays into the nostril(s) as directed by provider Daily., Disp: , Rfl:   •  gabapentin (NEURONTIN) 600 MG tablet, Take 600 mg by mouth 2 (Two) Times a Day., Disp: , Rfl:   •  Glucosamine HCl (GLUCOSAMINE PO), Take  by mouth 2 (two) times a day., Disp: , Rfl:   •  guaiFENesin (Mucinex) 600 MG 12 hr tablet, Take 400 mg by mouth Daily As Needed., Disp: , Rfl:   •  levocetirizine (XYZAL) 5 MG tablet, Take 5 mg by mouth Every Evening., Disp: , Rfl:   •  losartan (COZAAR) 50 MG tablet, Take 1 tablet by mouth Daily., Disp: 90 tablet, Rfl: 3  •  methocarbamol (ROBAXIN) 500 MG tablet, Take 500 mg by mouth 3 (Three) Times a Day., Disp: , Rfl:   •  Misc Natural Products (BLACK CHERRY CONCENTRATE PO), Take  by mouth 2 (two) times a day., Disp: , Rfl:   •  Multiple Vitamins-Minerals (ZINC PO), Take  by mouth., Disp: , Rfl:   •  multivitamin (THERAGRAN) tablet tablet, Daily., Disp: , Rfl:   •  mupirocin (BACTROBAN) 2 % ointment, Apply to affected area bid-tid, Disp: 30 g, Rfl: 0  •  niacin 500 MG tablet, Take 1,000 mg by mouth 2 (Two) Times a Day With Meals., Disp: , Rfl:   •  NON FORMULARY, , Disp: , Rfl:   •   "nystatin (MYCOSTATIN) 035146 UNIT/GM powder, Apply  topically to the appropriate area as directed As Needed., Disp: , Rfl:   •  pyridoxine (VITAMIN B-6) 100 MG tablet, Take 1 tablet by mouth Daily., Disp: , Rfl:   •  sertraline (ZOLOFT) 25 MG tablet, Take 25 mg by mouth Daily., Disp: , Rfl:   •  sulfamethoxazole-trimethoprim (BACTRIM DS,SEPTRA DS) 800-160 MG per tablet, 1 po bid, Disp: 14 tablet, Rfl: 0  •  tamsulosin (FLOMAX) 0.4 MG capsule 24 hr capsule, Take 1 capsule by mouth Daily., Disp: , Rfl:   •  traMADol (ULTRAM) 50 MG tablet, Every 8 (Eight) Hours As Needed., Disp: , Rfl:   •  Turmeric (QC TUMERIC COMPLEX PO), Take  by mouth Daily., Disp: , Rfl:     No Known Allergies    Review of Systems   Constitutional: Negative for fever.   HENT: Negative for dental problem and voice change.    Eyes: Negative for visual disturbance.   Respiratory: Negative for shortness of breath.    Cardiovascular: Negative for chest pain.   Gastrointestinal: Negative for abdominal pain.   Genitourinary: Negative for dysuria.   Musculoskeletal: Positive for arthralgias. Negative for gait problem and joint swelling.   Skin: Negative for rash.   Neurological: Negative for speech difficulty.   Hematological: Does not bruise/bleed easily.   Psychiatric/Behavioral: Negative for confusion.       I have reviewed the medical and surgical history, family history, social history, medications, and/or allergies, and the review of systems of this report.    Objective   Temp 98.2 °F (36.8 °C)   Ht 172.7 cm (68\")   Wt (!) 161 kg (355 lb 9.6 oz)   BMI 54.07 kg/m²    Physical Exam  Vitals and nursing note reviewed.   Constitutional:       Appearance: Normal appearance.   Pulmonary:      Effort: Pulmonary effort is normal.   Musculoskeletal:      Right shoulder: No deformity, effusion, bony tenderness or crepitus. Normal range of motion. Normal strength.   Neurological:      Mental Status: He is alert and oriented to person, place, and time. "       Right Shoulder Exam     Range of Motion   Active abduction: 130   Forward flexion: 140   Internal rotation 0 degrees: Lumbar   Internal rotation 90 degrees: 70     Muscle Strength   The patient has normal right shoulder strength.    Tests   Drop arm: negative    Other   Erythema: absent  Sensation: normal  Pulse: present           Extremity DVT signs are negative by clinical screen.   Neurologic Exam     Mental Status   Oriented to person, place, and time.            Assessment/Plan   Independent Review of Radiographic Studies:    No new imaging done today.      Procedures       Diagnoses and all orders for this visit:    1. S/P arthroscopy of right shoulder (Primary)    2. S/P rotator cuff surgery       Discussion of orthopedic goals  Risk, benefits, and merits of treatment alternatives reviewed with the patient and questions answered    Recommendations/Plan:  Exercise, medications, injections, other patient advice, and return appointment as noted.  Patient is encouraged to call or return for any issues or concerns.  Patient agreeable to call or return sooner for any concerns.    Follow up prn    EMR Dragon-transcription disclaimer:  This encounter note is an electronic transcription of spoken language to printed text.  Electronic transcription of spoken language may permit erroneous or at times nonsensical words or phrases to be inadvertently transcribed.  Although I have reviewed the note for such errors, some may still exist

## 2022-06-20 ENCOUNTER — TRANSCRIBE ORDERS (OUTPATIENT)
Dept: GENERAL RADIOLOGY | Facility: HOSPITAL | Age: 67
End: 2022-06-20

## 2022-06-20 ENCOUNTER — TELEPHONE (OUTPATIENT)
Dept: URGENT CARE | Facility: CLINIC | Age: 67
End: 2022-06-20

## 2022-06-20 ENCOUNTER — HOSPITAL ENCOUNTER (OUTPATIENT)
Dept: ULTRASOUND IMAGING | Facility: HOSPITAL | Age: 67
Discharge: HOME | End: 2022-06-20
Admitting: NURSE PRACTITIONER

## 2022-06-20 DIAGNOSIS — M79.605 PAIN IN LEFT LEG: ICD-10-CM

## 2022-06-20 DIAGNOSIS — M79.605 PAIN IN LEFT LEG: Primary | ICD-10-CM

## 2022-06-20 PROCEDURE — 93971 EXTREMITY STUDY: CPT

## 2022-06-20 NOTE — TELEPHONE ENCOUNTER
Phone call made to patient and his wife who is here with him at the visit for the results ankle x-ray and venous ultrasound they both were negative continue with plan of care for follow-up and if symptoms did change or present as discussed go to the ER.

## 2022-06-27 ENCOUNTER — OFFICE VISIT (OUTPATIENT)
Dept: ORTHOPEDIC SURGERY | Facility: CLINIC | Age: 67
End: 2022-06-27

## 2022-06-27 VITALS — HEIGHT: 68 IN | WEIGHT: 315 LBS | BODY MASS INDEX: 47.74 KG/M2 | TEMPERATURE: 98.6 F

## 2022-06-27 DIAGNOSIS — S86.812A STRAIN OF CALF MUSCLE, LEFT, INITIAL ENCOUNTER: Primary | ICD-10-CM

## 2022-06-27 DIAGNOSIS — M79.662 PAIN OF LEFT LOWER LEG: ICD-10-CM

## 2022-06-27 PROCEDURE — 99213 OFFICE O/P EST LOW 20 MIN: CPT | Performed by: PHYSICIAN ASSISTANT

## 2022-06-27 NOTE — PROGRESS NOTES
Subjective   Patient ID: Zbigniew Andrews is a 67 y.o. right hand dominant male  Pain of the Left Ankle (States he was fishing and made a sudden step and his ankle popped 6/18/22, most of his pain is in his calf.) and Pain of the Left Lower Leg         History of Present Illness    Patient presents with complaints of left posterior leg/calf pain that occurred on 6/18/2022.  He states while standing he made a sudden step and noticed a popping sensation to the back of the left lower leg with associated pain.  He did wear a family members pneumatic boot which seemed to help some.   He felt like initially the symptoms were improving but a few days ago he was walking in his yard and stepped on uneven ground and this seemed to exacerbate the pain.  He did have x-rays of the left ankle which were negative for acute process.  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         Past Surgical History:   Procedure Laterality Date   • COLONOSCOPY     • ENDOSCOPY     • GASTRIC SLEEVE LAPAROSCOPIC     • HERNIA REPAIR     • PYLOROMYOTOMY  1954   • SHOULDER ARTHROSCOPY W/ ROTATOR CUFF REPAIR Right 10/21/2021    Procedure: Shoulder diagnostic arthroscopy, synovectomy, labral debridement, biceps tenolysis subacromial decompression, bursectomy, distal clavicle excision and mini open rotator cuff  repair.;  Surgeon: Venancio Espinosa MD;  Location: [facility];  Service: Orthopedics;  Laterality: Right;   • SKIN BIOPSY     • UMBILICAL HERNIA REPAIR  2000   • VENA CAVA FILTER INSERTION         Family History   Problem Relation Age of Onset   • Hypertension Mother    • Cancer Mother    • Arthritis Mother    • Asthma Mother    • Ovarian cancer Mother    • Hypertension Father    • Heart disease Father    • Arthritis Father    • Kidney disease Father    • Alzheimer's disease Father    • Kidney failure Father    • Arthritis Sister    • Arthritis Brother    • Hypertension Brother    • Hepatitis Brother    • Atrial fibrillation Brother        Social History     Socioeconomic History   • Marital status:    Tobacco Use   • Smoking status: Never Smoker   • Smokeless tobacco: Never Used   Vaping Use   • Vaping Use: Never used   Substance and Sexual Activity   • Alcohol use: Not Currently     Comment: Social, quit [id number]   • Drug use: Never   • Sexual activity: Defer         Current Outpatient Medications:   •  amLODIPine (NORVASC) 5 MG tablet, Take 1 tablet by mouth Daily., Disp: 90 tablet, Rfl: 3  •  Ascorbic Acid (VITAMIN C PO), Take  by mouth Daily., Disp: , Rfl:   •  Cholecalciferol 50 MCG (2000 UT) tablet, Take 4,000 Units by mouth Daily., Disp: , Rfl:   •  Cinnamon 500 MG capsule, Take 500 mg by mouth Daily., Disp: , Rfl:   •  donepezil (ARICEPT) 10 MG tablet, Take 10 mg by mouth Daily., Disp: , Rfl:   •  fluticasone (FLONASE) 50 MCG/ACT nasal spray, 2 sprays into the nostril(s) as directed by provider Daily., Disp: , Rfl:   •  gabapentin (NEURONTIN) 600 MG tablet, Take 600 mg by mouth 2 (Two) Times a Day., Disp: , Rfl:   •  Glucosamine HCl (GLUCOSAMINE PO), Take  by mouth 2 (two) times a day., Disp: , Rfl:   •  guaiFENesin (Mucinex) 600 MG 12 hr tablet, Take 400 mg by mouth Daily As Needed., Disp: , Rfl:   •  levocetirizine (XYZAL) 5 MG tablet, Take 5 mg by mouth Every Evening., Disp: , Rfl:   •  losartan  (COZAAR) 50 MG tablet, Take 1 tablet by mouth Daily., Disp: 90 tablet, Rfl: 3  •  methocarbamol (ROBAXIN) 500 MG tablet, Take 500 mg by mouth 3 (Three) Times a Day., Disp: , Rfl:   •  Misc Natural Products (BLACK CHERRY CONCENTRATE PO), Take  by mouth 2 (two) times a day., Disp: , Rfl:   •  Multiple Vitamins-Minerals (ZINC PO), Take  by mouth., Disp: , Rfl:   •  multivitamin (THERAGRAN) tablet tablet, Daily., Disp: , Rfl:   •  niacin 500 MG tablet, Take 1,000 mg by mouth 2 (Two) Times a Day With Meals., Disp: , Rfl:   •  NON FORMULARY, , Disp: , Rfl:   •  nystatin (MYCOSTATIN) 723249 UNIT/GM powder, Apply  topically to the appropriate area as directed As Needed., Disp: , Rfl:   •  pyridoxine (VITAMIN B-6) 100 MG tablet, Take 1 tablet by mouth Daily., Disp: , Rfl:   •  sertraline (ZOLOFT) 25 MG tablet, Take 25 mg by mouth Daily., Disp: , Rfl:   •  tamsulosin (FLOMAX) 0.4 MG capsule 24 hr capsule, Take 1 capsule by mouth Daily., Disp: , Rfl:   •  traMADol (ULTRAM) 50 MG tablet, Every 8 (Eight) Hours As Needed., Disp: , Rfl:   •  Turmeric (QC TUMERIC COMPLEX PO), Take  by mouth Daily., Disp: , Rfl:     No Known Allergies    Review of Systems   Constitutional: Negative for fever.   HENT: Negative for dental problem and voice change.    Eyes: Negative for visual disturbance.   Respiratory: Negative for shortness of breath.    Cardiovascular: Negative for chest pain.   Gastrointestinal: Negative for abdominal pain.   Genitourinary: Negative for dysuria.   Musculoskeletal: Positive for arthralgias (left lower leg, left ankle), gait problem (presents with cane) and joint swelling (left lower leg, left ankle).   Skin: Positive for color change. Negative for rash.   Neurological: Negative for speech difficulty.   Hematological: Does not bruise/bleed easily.   Psychiatric/Behavioral: Negative for confusion.       I have reviewed the medical and surgical history, family history, social history, medications, and/or allergies,  "and the review of systems of this report.    Objective   Temp 98.6 °F (37 °C)   Ht 172.7 cm (67.99\")   Wt (!) 159 kg (350 lb)   BMI 53.23 kg/m²    Physical Exam  Vitals and nursing note reviewed.   Constitutional:       Appearance: Normal appearance.   Pulmonary:      Effort: Pulmonary effort is normal.   Musculoskeletal:      Left ankle: Tenderness present.      Left Achilles Tendon: Tenderness present. No defects. Hazel's test negative.      Left foot: Normal capillary refill. Swelling (minor ) present. No deformity, bony tenderness or crepitus. Normal pulse.        Legs:    Neurological:      Mental Status: He is alert and oriented to person, place, and time.       Ortho Exam      Neurologic Exam     Mental Status   Oriented to person, place, and time.        There was mild indentation of the left lower leg from the ill fitting pneumatic boot he borrowed from a friend.  I instructed the family to monitor the skin for any skin breakdown or blistering      Assessment & Plan   Independent Review of Radiographic Studies:    No new imaging done today.  Narrative & Impression   FINAL REPORT     TECHNIQUE:  3 views of the left ankle obtained.     CLINICAL HISTORY:  decreased ROM, swelling and pain and calf pain.     FINDINGS:  No acute fracture or malalignment.  There is bilateral soft  tissue swelling, medial greater than lateral.  There is  irregularity of the medial malleolus, suspect posttraumatic  change from a remote injury.  No ankle effusion is seen.  There  is a moderate plantar calcaneal spur.     IMPRESSION:  No acute fracture or malalignment.    Moderate soft tissue  swelling.     Authenticated and Electronically Signed by Bee Peacock MD on  06/20/2022 06:17:17 PM     FINAL REPORT     TECHNIQUE:  Ultrasound images of the deep venous system were obtained from  the left groin to the calf veins.     CLINICAL HISTORY:  PAIN IN LEFT LEG     FINDINGS:  The deep venous system is normally compressible.  " Normal flow is  identified.     IMPRESSION:  No evidence of left lower extremity DVT.     Authenticated and Electronically Signed by Bee Peacock MD on  06/20/2022 07:18:50 PM    Procedures       Diagnoses and all orders for this visit:    1. Strain of calf muscle, left, initial encounter (Primary)  -     MRI Tibia Fibula Left Without Contrast; Future    2. Pain of left lower leg  -     MRI Tibia Fibula Left Without Contrast; Future       Orthopedic activities reviewed and patient expressed appreciation  Discussion of orthopedic goals  Risk, benefits, and merits of treatment alternatives reviewed with the patient and questions answered  Reduced physical activity as appropriate  Weight bearing parameters reviewed  Avoid offending activity    Recommendations/Plan:  Exercise, medications, injections, other patient advice, and return appointment as noted.  Patient is encouraged to call or return for any issues or concerns.    We did provide the patient a high tide pneumatic boot that was appropriate for his shoe size.  He is instructed to elevate the leg and use warm heating pad to the left calf daily.  Follow-up after MRI of the left tib-fib  Patient agreeable to call or return sooner for any concerns.        EMR Dragon-transcription disclaimer:  This encounter note is an electronic transcription of spoken language to printed text.  Electronic transcription of spoken language may permit erroneous or at times nonsensical words or phrases to be inadvertently transcribed.  Although I have reviewed the note for such errors, some may still exist

## 2022-06-29 ENCOUNTER — TELEPHONE (OUTPATIENT)
Dept: ORTHOPEDIC SURGERY | Facility: CLINIC | Age: 67
End: 2022-06-29

## 2022-07-13 ENCOUNTER — OFFICE VISIT (OUTPATIENT)
Dept: ORTHOPEDIC SURGERY | Facility: CLINIC | Age: 67
End: 2022-07-13

## 2022-07-13 VITALS — TEMPERATURE: 98.2 F | WEIGHT: 315 LBS | HEIGHT: 68 IN | BODY MASS INDEX: 47.74 KG/M2

## 2022-07-13 DIAGNOSIS — M79.662 PAIN OF LEFT LOWER LEG: ICD-10-CM

## 2022-07-13 DIAGNOSIS — S86.812A STRAIN OF CALF MUSCLE, LEFT, INITIAL ENCOUNTER: Primary | ICD-10-CM

## 2022-07-13 PROCEDURE — 99212 OFFICE O/P EST SF 10 MIN: CPT | Performed by: PHYSICIAN ASSISTANT

## 2022-07-13 NOTE — PROGRESS NOTES
Subjective   Patient ID: Zbigniew Andrews is a 67 y.o. right hand dominant male  Results and Follow-up of the Left Ankle (Go over MRI results)         History of Present Illness  Patient is following up to review MRI results of left tib/fib. He initially twisted his left lower leg and felt a popping sensation 6-18-22.    He reports he is doing much better. He has been able to mow his lawn and go fishing without issues.  He denies numbness or tingling to the lower extremity.  Denies skin or temperature changes to the left foot.                                                   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         Past Surgical History:   Procedure Laterality Date   • COLONOSCOPY     • ENDOSCOPY     • GASTRIC SLEEVE LAPAROSCOPIC     • HERNIA REPAIR     • PYLOROMYOTOMY  1954   • SHOULDER ARTHROSCOPY W/ ROTATOR CUFF REPAIR Right 10/21/2021    Procedure: Shoulder diagnostic arthroscopy, synovectomy, labral debridement, biceps tenolysis subacromial decompression, bursectomy, distal clavicle excision and mini open rotator cuff repair.;  Surgeon: Venancio Espinosa MD;  Location: [facility];  Service: Orthopedics;  Laterality: Right;   • SKIN BIOPSY     • UMBILICAL HERNIA REPAIR  2000   • VENA CAVA FILTER INSERTION         Family History   Problem Relation Age of Onset   • Hypertension Mother    • Cancer Mother    • Arthritis Mother    • Asthma Mother    • Ovarian cancer Mother    • Hypertension Father    • Heart disease Father    • Arthritis Father    • Kidney disease Father    • Alzheimer's disease Father    • Kidney failure Father    • Arthritis Sister    • Arthritis Brother    •  Hypertension Brother    • Hepatitis Brother    • Atrial fibrillation Brother        Social History     Socioeconomic History   • Marital status:    Tobacco Use   • Smoking status: Never Smoker   • Smokeless tobacco: Never Used   Vaping Use   • Vaping Use: Never used   Substance and Sexual Activity   • Alcohol use: Not Currently     Comment: Social, quit [id number]   • Drug use: Never   • Sexual activity: Defer         Current Outpatient Medications:   •  amLODIPine (NORVASC) 5 MG tablet, Take 1 tablet by mouth Daily., Disp: 90 tablet, Rfl: 3  •  Ascorbic Acid (VITAMIN C PO), Take  by mouth Daily., Disp: , Rfl:   •  Cholecalciferol 50 MCG (2000 UT) tablet, Take 4,000 Units by mouth Daily., Disp: , Rfl:   •  Cinnamon 500 MG capsule, Take 500 mg by mouth Daily., Disp: , Rfl:   •  donepezil (ARICEPT) 10 MG tablet, Take 10 mg by mouth Daily., Disp: , Rfl:   •  fluticasone (FLONASE) 50 MCG/ACT nasal spray, 2 sprays into the nostril(s) as directed by provider Daily., Disp: , Rfl:   •  gabapentin (NEURONTIN) 600 MG tablet, Take 600 mg by mouth 2 (Two) Times a Day., Disp: , Rfl:   •  Glucosamine HCl (GLUCOSAMINE PO), Take  by mouth 2 (two) times a day., Disp: , Rfl:   •  guaiFENesin (Mucinex) 600 MG 12 hr tablet, Take 400 mg by mouth Daily As Needed., Disp: , Rfl:   •  levocetirizine (XYZAL) 5 MG tablet, Take 5 mg by mouth Every Evening., Disp: , Rfl:   •  losartan (COZAAR) 50 MG tablet, Take 1 tablet by mouth Daily., Disp: 90 tablet, Rfl: 3  •  methocarbamol (ROBAXIN) 500 MG tablet, Take 500 mg by mouth 3 (Three) Times a Day., Disp: , Rfl:   •  Misc Natural Products (BLACK CHERRY CONCENTRATE PO), Take  by mouth 2 (two) times a day., Disp: , Rfl:   •  Multiple Vitamins-Minerals (ZINC PO), Take  by mouth., Disp: , Rfl:   •  multivitamin (THERAGRAN) tablet tablet, Daily., Disp: , Rfl:   •  niacin 500 MG tablet, Take 1,000 mg by mouth 2 (Two) Times a Day With Meals., Disp: , Rfl:   •  NON FORMULARY, , Disp: , Rfl:   •   "nystatin (MYCOSTATIN) 397783 UNIT/GM powder, Apply  topically to the appropriate area as directed As Needed., Disp: , Rfl:   •  pyridoxine (VITAMIN B-6) 100 MG tablet, Take 1 tablet by mouth Daily., Disp: , Rfl:   •  sertraline (ZOLOFT) 25 MG tablet, Take 25 mg by mouth Daily., Disp: , Rfl:   •  tamsulosin (FLOMAX) 0.4 MG capsule 24 hr capsule, Take 1 capsule by mouth Daily., Disp: , Rfl:   •  traMADol (ULTRAM) 50 MG tablet, Every 8 (Eight) Hours As Needed., Disp: , Rfl:   •  Turmeric (QC TUMERIC COMPLEX PO), Take  by mouth Daily., Disp: , Rfl:     No Known Allergies    Review of Systems   Constitutional: Negative for fever.   HENT: Negative for dental problem and voice change.    Eyes: Negative for visual disturbance.   Respiratory: Negative for shortness of breath.    Cardiovascular: Negative for chest pain.   Gastrointestinal: Negative for abdominal pain.   Genitourinary: Negative for dysuria.   Musculoskeletal: Positive for arthralgias. Negative for gait problem and joint swelling.   Skin: Negative for rash.   Neurological: Negative for speech difficulty.   Hematological: Does not bruise/bleed easily.   Psychiatric/Behavioral: Negative for confusion.       I have reviewed the medical and surgical history, family history, social history, medications, and/or allergies, and the review of systems of this report.    Objective   Temp 98.2 °F (36.8 °C)   Ht 172.7 cm (67.99\")   Wt (!) 160 kg (352 lb 12.8 oz)   BMI 53.66 kg/m²    Physical Exam  Vitals and nursing note reviewed.   Constitutional:       Appearance: Normal appearance.   Pulmonary:      Effort: Pulmonary effort is normal.   Musculoskeletal:      Left lower leg: Tenderness present. No swelling, deformity, lacerations or bony tenderness.      Left ankle: No ecchymosis. No tenderness. Anterior drawer test negative. Normal pulse.      Left Achilles Tendon: No tenderness. Hazel's test negative.   Neurological:      Mental Status: He is alert and oriented to " person, place, and time.       Ortho Exam   Extremity DVT signs are negative on physical exam with negative Lashell sign, no palpable cords and no skin tone change   Neurologic Exam     Mental Status   Oriented to person, place, and time.              Assessment & Plan   Independent Review of Radiographic Studies:    No new imaging done today.  I did review MRI imaging as well as the interpretation from the radiologist of the left tib-fib region.  There is a likely rupture of the plantar's tendon with coinciding hematoma.    Procedures       Diagnoses and all orders for this visit:    1. Strain of calf muscle, left, initial encounter (Primary)    2. Pain of left lower leg       Discussion of orthopedic goals  Risk, benefits, and merits of treatment alternatives reviewed with the patient and questions answered    Recommendations/Plan:  Patient is encouraged to call or return for any issues or concerns.  Patient agreeable to call or return sooner for any concerns.    Continue using warm heat as needed  Patient and his wife politely declined formal PT because he is doing better.   They can contact the office at any point if his symptoms return.    EMR Dragon-transcription disclaimer:  This encounter note is an electronic transcription of spoken language to printed text.  Electronic transcription of spoken language may permit erroneous or at times nonsensical words or phrases to be inadvertently transcribed.  Although I have reviewed the note for such errors, some may still exist

## 2022-10-11 NOTE — PROGRESS NOTES
[facility] Cardiology Office Follow Up Note    Zbigniew Andrews  [id number]  10/12/2022    Primary Care Provider: Katina Cote APRN   Referring Provider: No ref. provider found    Chief Complaint: Regular follow-up    History of Present Illness:   Mr. Zbigniew Andrews is a 67 y.o. male who presents to the Cardiology Clinic for regular follow-up.  The patient has a past medical history significant for hypertension, gout, BPH, and obesity.  He does not have any significant past cardiac history.  He reports undergoing ischemic evaluation including a coronary angiogram in [place] in , with no significant CAD at that time.  He presents today for follow-up.  The patient reports feeling generally well today.  He specifically denies chest pain, shortness of breath, palpitations, and syncope.  He denies orthopnea and lower extremity edema.  He does report that a couple months ago he experienced some episodic dizziness upon standing, but states this has resolved.  He continues to take his medications as prescribed.  He offers no other complaints or concerns at this time.         Past Cardiac Testin. Last Coronary Angio: , reportedly no significant CAD  2. Prior Stress Testing: None  3. Last Echo: 10/20/2011  1. Normal transthoracic echocardiogram  2. LVEF 55-60%  3. Right ventricular systolic pressure is   27mmHg.    4. Prior Holter Monitor: None    Review of Systems:   Review of Systems   Constitutional: Negative for activity change, chills, diaphoresis, fatigue, fever and unexpected weight gain.   Eyes: Negative for blurred vision and visual disturbance.   Respiratory: Negative for apnea, cough, chest tightness, shortness of breath and wheezing.    Cardiovascular: Negative for chest pain, palpitations and leg swelling.   Gastrointestinal: Negative for abdominal distention, blood in stool, GERD and indigestion.   Endocrine: Negative for cold intolerance and heat intolerance.    Genitourinary: Negative for hematuria.   Musculoskeletal: Negative for gait problem, joint swelling and myalgias.   Skin: Negative for color change, pallor and wound.   Neurological: Positive for dizziness. Negative for seizures, syncope, weakness, light-headedness, numbness, headache and confusion.   Hematological: Does not bruise/bleed easily.   Psychiatric/Behavioral: Negative for behavioral problems, sleep disturbance, suicidal ideas and depressed mood.     I have reviewed and confirmed the accuracy of the ROS as documented by the MA/LPN/RN CAROL Preciado    I have reviewed and/or updated the patient's past medical, past surgical, family, social history, problem list and allergies as appropriate.     Medications:     Current Outpatient Medications:   •  amLODIPine (NORVASC) 5 MG tablet, Take 1 tablet by mouth Daily., Disp: 90 tablet, Rfl: 3  •  Ascorbic Acid (VITAMIN C PO), Take  by mouth Daily., Disp: , Rfl:   •  Cholecalciferol 50 MCG (2000 UT) tablet, Take 4,000 Units by mouth Daily., Disp: , Rfl:   •  Cinnamon 500 MG capsule, Take 500 mg by mouth Daily., Disp: , Rfl:   •  donepezil (ARICEPT) 10 MG tablet, Take 10 mg by mouth Daily., Disp: , Rfl:   •  fluticasone (FLONASE) 50 MCG/ACT nasal spray, 2 sprays into the nostril(s) as directed by provider Daily., Disp: , Rfl:   •  gabapentin (NEURONTIN) 600 MG tablet, Take 600 mg by mouth 2 (Two) Times a Day., Disp: , Rfl:   •  guaiFENesin (Mucinex) 600 MG 12 hr tablet, Take 400 mg by mouth Daily As Needed., Disp: , Rfl:   •  levocetirizine (XYZAL) 5 MG tablet, Take 5 mg by mouth Every Evening., Disp: , Rfl:   •  losartan (COZAAR) 50 MG tablet, Take 1 tablet by mouth Daily., Disp: 90 tablet, Rfl: 3  •  methocarbamol (ROBAXIN) 500 MG tablet, Take 500 mg by mouth 3 (Three) Times a Day., Disp: , Rfl:   •  Misc Natural Products (BLACK CHERRY CONCENTRATE PO), Take  by mouth 2 (two) times a day., Disp: , Rfl:   •  Multiple Vitamins-Minerals (ZINC PO), Take   "by mouth., Disp: , Rfl:   •  multivitamin (THERAGRAN) tablet tablet, Daily., Disp: , Rfl:   •  NON FORMULARY, , Disp: , Rfl:   •  sertraline (ZOLOFT) 25 MG tablet, Take 25 mg by mouth Daily., Disp: , Rfl:   •  tamsulosin (FLOMAX) 0.4 MG capsule 24 hr capsule, Take 1 capsule by mouth Daily., Disp: , Rfl:   •  traMADol (ULTRAM) 50 MG tablet, Every 8 (Eight) Hours As Needed., Disp: , Rfl:   •  Turmeric (QC TUMERIC COMPLEX PO), Take  by mouth Daily., Disp: , Rfl:     Physical Exam:  Vital Signs:   Vitals:    10/12/22 1411   BP: 130/70   BP Location: Left arm   Patient Position: Sitting   Cuff Size: Adult   Pulse: 70   SpO2: 96%   Weight: (!) 161 kg (356 lb)   Height: 170.2 cm (67\")     Body mass index is 55.76 kg/m².    Physical Exam  Vitals and nursing note reviewed.   Constitutional:       General: He is not in acute distress.     Appearance: He is morbidly obese.   HENT:      Head: Normocephalic and atraumatic.      Mouth/Throat:      Mouth: Mucous membranes are moist.   Eyes:      General: No scleral icterus.     Extraocular Movements: Extraocular movements intact.   Neck:      Trachea: Trachea normal.   Cardiovascular:      Rate and Rhythm: Normal rate and regular rhythm.      Pulses: Normal pulses.      Heart sounds: Normal heart sounds. No murmur heard.    No friction rub. No gallop.   Pulmonary:      Effort: Pulmonary effort is normal.      Breath sounds: Decreased air movement present.   Abdominal:      Palpations: Abdomen is soft.      Tenderness: There is no abdominal tenderness.   Musculoskeletal:         General: Normal range of motion.      Cervical back: Neck supple.      Right lower leg: No edema.      Left lower leg: No edema.   Skin:     General: Skin is warm and dry.      Findings: No bruising, lesion or rash.   Neurological:      Mental Status: He is alert and oriented to person, place, and time.      Motor: No weakness.      Gait: Gait normal.   Psychiatric:         Mood and Affect: Mood normal.    "      Behavior: Behavior normal. Behavior is cooperative.         Thought Content: Thought content does not include suicidal ideation.         Results Review:   I reviewed the patient's new clinical results.      Assessment / Plan:     1. Chest pain  --No significant past cardiac history, coronary angiogram in 2012 no significant CAD at that time  --Prior ECG without evidence of acute or prior ischemia  --RESOLVED    2. Hypertension  --Currently well controlled     3. Morbid obesity with BMI of 50.0-59.9  --Recommend weight loss through diet and exercise    Preventative Cardiology:   Tobacco Cessation: N/A   Advance Care Planning: ACP discussion was declined by the patient. Patient has an advance directive in EMR which is still valid.      Follow Up:   Return in about 1 year (around 10/12/2023) for Follow-up with Dr. Chavarria.      Thank you for allowing me to participate in the care of your patient. Please to not hesitate to contact me with additional questions or concerns.     Darling Rod APRN

## 2022-10-12 ENCOUNTER — OFFICE VISIT (OUTPATIENT)
Dept: CARDIOLOGY | Facility: CLINIC | Age: 67
End: 2022-10-12

## 2022-10-12 VITALS
HEART RATE: 70 BPM | SYSTOLIC BLOOD PRESSURE: 130 MMHG | OXYGEN SATURATION: 96 % | DIASTOLIC BLOOD PRESSURE: 70 MMHG | WEIGHT: 315 LBS | BODY MASS INDEX: 49.44 KG/M2 | HEIGHT: 67 IN

## 2022-10-12 DIAGNOSIS — E66.01 MORBID OBESITY WITH BMI OF 50.0-59.9, ADULT: ICD-10-CM

## 2022-10-12 DIAGNOSIS — R07.9 CHEST PAIN, UNSPECIFIED TYPE: Primary | ICD-10-CM

## 2022-10-12 DIAGNOSIS — I10 PRIMARY HYPERTENSION: ICD-10-CM

## 2022-10-12 PROCEDURE — 99213 OFFICE O/P EST LOW 20 MIN: CPT | Performed by: NURSE PRACTITIONER

## 2022-10-12 RX ORDER — AMLODIPINE BESYLATE 5 MG/1
5 TABLET ORAL DAILY
Qty: 90 TABLET | Refills: 3 | Status: SHIPPED | OUTPATIENT
Start: 2022-10-12

## 2022-10-12 RX ORDER — LOSARTAN POTASSIUM 50 MG/1
50 TABLET ORAL DAILY
Qty: 90 TABLET | Refills: 3 | Status: SHIPPED | OUTPATIENT
Start: 2022-10-12

## 2022-11-09 ENCOUNTER — OFFICE VISIT (OUTPATIENT)
Dept: INTERNAL MEDICINE | Facility: CLINIC | Age: 67
End: 2022-11-09

## 2022-11-09 VITALS
SYSTOLIC BLOOD PRESSURE: 130 MMHG | HEART RATE: 78 BPM | DIASTOLIC BLOOD PRESSURE: 82 MMHG | OXYGEN SATURATION: 98 % | BODY MASS INDEX: 49.44 KG/M2 | WEIGHT: 315 LBS | TEMPERATURE: 96.8 F | HEIGHT: 67 IN

## 2022-11-09 DIAGNOSIS — M54.2 NECK PAIN: ICD-10-CM

## 2022-11-09 DIAGNOSIS — Z87.442 HISTORY OF NEPHROLITHIASIS: ICD-10-CM

## 2022-11-09 DIAGNOSIS — R06.09 DOE (DYSPNEA ON EXERTION): ICD-10-CM

## 2022-11-09 DIAGNOSIS — R41.3 MEMORY LOSS: ICD-10-CM

## 2022-11-09 DIAGNOSIS — M1A.0720 IDIOPATHIC CHRONIC GOUT OF LEFT FOOT WITHOUT TOPHUS: ICD-10-CM

## 2022-11-09 DIAGNOSIS — M79.7 FIBROMYALGIA: ICD-10-CM

## 2022-11-09 DIAGNOSIS — R23.3 BRUISES EASILY: ICD-10-CM

## 2022-11-09 DIAGNOSIS — F32.A DEPRESSION, UNSPECIFIED DEPRESSION TYPE: ICD-10-CM

## 2022-11-09 DIAGNOSIS — Z98.890 HISTORY OF REPAIR OF LEFT ROTATOR CUFF: ICD-10-CM

## 2022-11-09 DIAGNOSIS — R93.3 ABNORMAL FINDINGS ON DIAGNOSTIC IMAGING OF OTHER PARTS OF DIGESTIVE TRACT: ICD-10-CM

## 2022-11-09 DIAGNOSIS — E66.9 OBESITY WITH SERIOUS COMORBIDITY, UNSPECIFIED CLASSIFICATION, UNSPECIFIED OBESITY TYPE: ICD-10-CM

## 2022-11-09 DIAGNOSIS — R53.83 OTHER FATIGUE: ICD-10-CM

## 2022-11-09 DIAGNOSIS — E66.01 MORBID OBESITY WITH BMI OF 50.0-59.9, ADULT: ICD-10-CM

## 2022-11-09 DIAGNOSIS — T78.40XA ALLERGY, INITIAL ENCOUNTER: ICD-10-CM

## 2022-11-09 DIAGNOSIS — E55.9 VITAMIN D DEFICIENCY: ICD-10-CM

## 2022-11-09 DIAGNOSIS — R73.9 HYPERGLYCEMIA: ICD-10-CM

## 2022-11-09 DIAGNOSIS — F41.9 ANXIETY: ICD-10-CM

## 2022-11-09 DIAGNOSIS — N40.1 BENIGN PROSTATIC HYPERPLASIA WITH LOWER URINARY TRACT SYMPTOMS, SYMPTOM DETAILS UNSPECIFIED: ICD-10-CM

## 2022-11-09 DIAGNOSIS — I10 PRIMARY HYPERTENSION: Primary | ICD-10-CM

## 2022-11-09 DIAGNOSIS — Z98.84 HISTORY OF BARIATRIC SURGERY: ICD-10-CM

## 2022-11-09 PROBLEM — S46.011A TRAUMATIC TEAR OF RIGHT ROTATOR CUFF: Status: RESOLVED | Noted: 2021-10-11 | Resolved: 2022-11-09

## 2022-11-09 PROBLEM — M19.019 AC JOINT ARTHROPATHY: Status: RESOLVED | Noted: 2021-10-11 | Resolved: 2022-11-09

## 2022-11-09 PROCEDURE — 99214 OFFICE O/P EST MOD 30 MIN: CPT | Performed by: INTERNAL MEDICINE

## 2022-11-09 RX ORDER — DONEPEZIL HYDROCHLORIDE 10 MG/1
10 TABLET, FILM COATED ORAL DAILY
Qty: 90 TABLET | Refills: 0 | Status: SHIPPED | OUTPATIENT
Start: 2022-11-09 | End: 2023-02-20

## 2022-11-09 RX ORDER — LEVOCETIRIZINE DIHYDROCHLORIDE 5 MG/1
5 TABLET, FILM COATED ORAL EVERY EVENING
Qty: 90 TABLET | Refills: 0 | Status: SHIPPED | OUTPATIENT
Start: 2022-11-09 | End: 2023-01-30

## 2022-11-09 NOTE — PROGRESS NOTES
Subjective   Zbigniew Andrews is a 67 y.o. male.     Chief Complaint   Patient presents with   • Establish Care     Needs provider to take over prescriptions   • Hyperlipidemia   • Hypertension       History of Present Illness   Patient here to establish care.  Blood pressure stable on medications.  Weight is very high.  Vitamin D stable on supplement check lab.  Allergies stable on medication.  Depression anxiety stable on medication.  The BPH is stable.  Fibromyalgia stable.  Gout is stable on medication.  Weight is very high.  Patient also complains of memory loss for which she is he is taking Aricept.  History of a bariatric surgery doing well.  Bruise easily.    Current Outpatient Medications:   •  amLODIPine (NORVASC) 5 MG tablet, Take 1 tablet by mouth Daily., Disp: 90 tablet, Rfl: 3  •  Ascorbic Acid (VITAMIN C PO), Take  by mouth Daily., Disp: , Rfl:   •  Cholecalciferol 50 MCG (2000 UT) tablet, Take 4,000 Units by mouth Daily., Disp: , Rfl:   •  Cinnamon 500 MG capsule, Take 500 mg by mouth Daily., Disp: , Rfl:   •  donepezil (ARICEPT) 10 MG tablet, Take 1 tablet by mouth Daily., Disp: 90 tablet, Rfl: 0  •  fluticasone (FLONASE) 50 MCG/ACT nasal spray, 2 sprays into the nostril(s) as directed by provider Daily., Disp: , Rfl:   •  gabapentin (NEURONTIN) 600 MG tablet, Take 600 mg by mouth 2 (Two) Times a Day., Disp: , Rfl:   •  guaiFENesin (Mucinex) 600 MG 12 hr tablet, Take 400 mg by mouth Daily As Needed., Disp: , Rfl:   •  levocetirizine (XYZAL) 5 MG tablet, Take 1 tablet by mouth Every Evening., Disp: 90 tablet, Rfl: 0  •  losartan (COZAAR) 50 MG tablet, Take 1 tablet by mouth Daily., Disp: 90 tablet, Rfl: 3  •  methocarbamol (ROBAXIN) 500 MG tablet, Take 500 mg by mouth 3 (Three) Times a Day., Disp: , Rfl:   •  Misc Natural Products (BLACK CHERRY CONCENTRATE PO), Take  by mouth 2 (two) times a day., Disp: , Rfl:   •  Multiple Vitamins-Minerals (ZINC PO), Take  by mouth., Disp: , Rfl:   •   multivitamin (THERAGRAN) tablet tablet, Daily., Disp: , Rfl:   •  NON FORMULARY, , Disp: , Rfl:   •  sertraline (ZOLOFT) 25 MG tablet, Take 25 mg by mouth Daily., Disp: , Rfl:   •  tamsulosin (FLOMAX) 0.4 MG capsule 24 hr capsule, Take 1 capsule by mouth Daily., Disp: , Rfl:   •  traMADol (ULTRAM) 50 MG tablet, Every 8 (Eight) Hours As Needed., Disp: , Rfl:   •  Turmeric (QC TUMERIC COMPLEX PO), Take  by mouth Daily., Disp: , Rfl:     The following portions of the patient's history were reviewed and updated as appropriate: allergies, current medications, past family history, past medical history, past social history, past surgical history and problem list.    Review of Systems   Constitutional: Negative.    Respiratory: Negative.    Cardiovascular: Negative.    Gastrointestinal: Negative.    Musculoskeletal: Positive for back pain and neck pain.   Skin: Negative.    Neurological: Negative.    Psychiatric/Behavioral: Negative.        Objective   Physical Exam  Cardiovascular:      Rate and Rhythm: Normal rate and regular rhythm.      Heart sounds: Normal heart sounds.   Pulmonary:      Effort: Pulmonary effort is normal.      Breath sounds: Normal breath sounds.   Abdominal:      General: Bowel sounds are normal.   Musculoskeletal:      Cervical back: Neck supple.   Skin:     General: Skin is warm.   Neurological:      Mental Status: He is alert and oriented to person, place, and time.         All tests have been reviewed.    Assessment & Plan   Diagnoses and all orders for this visit:    Primary hypertension stable on losartan 50 mg daily continue medication    Morbid obesity with BMI of 50.0-59.9, adult (HCC) good diet weight loss needed    Vitamin D deficiency stable on supplement    Allergy, continue over-the-counter medicine for allergy    Depression, continue medication Zoloft 25 mg daily    Anxiety stable on medication continue Zoloft 25 mg daily    Benign prostatic hyperplasia with lower urinary tract  symptoms, symptom details unspecified check lab continue Flomax    History of nephrolithiasis stable    Neck pain PT as needed    Fibromyalgia continue to watch    History of repair of left rotator cuff    Idiopathic chronic gout of left foot without tophus check lab    Obesity with serious comorbidity, unspecified classification, unspecified obesity type    Memory loss    Other fatigue check lab    DARNELL (dyspnea on exertion)    History of bariatric surgery    Sleep apnea on cpap  Bruises easily      Labs reviewed     Possible wellness

## 2022-11-19 LAB
25(OH)D3+25(OH)D2 SERPL-MCNC: 60.4 NG/ML (ref 30–100)
ALBUMIN SERPL-MCNC: 4 G/DL (ref 3.5–5.2)
ALBUMIN/GLOB SERPL: 1.1 G/DL
ALP SERPL-CCNC: 104 U/L (ref 39–117)
ALT SERPL-CCNC: 12 U/L (ref 1–41)
AST SERPL-CCNC: 11 U/L (ref 1–40)
BASOPHILS # BLD AUTO: 0.03 10*3/MM3 (ref 0–0.2)
BASOPHILS NFR BLD AUTO: 0.4 % (ref 0–1.5)
BILIRUB SERPL-MCNC: 0.4 MG/DL (ref 0–1.2)
BUN SERPL-MCNC: 16 MG/DL (ref 8–23)
BUN/CREAT SERPL: 15.5 (ref 7–25)
CALCIUM SERPL-MCNC: 9.6 MG/DL (ref 8.6–10.5)
CHLORIDE SERPL-SCNC: 104 MMOL/L (ref 98–107)
CHOLEST SERPL-MCNC: 148 MG/DL (ref 0–200)
CO2 SERPL-SCNC: 26.9 MMOL/L (ref 22–29)
CREAT SERPL-MCNC: 1.03 MG/DL (ref 0.76–1.27)
EGFRCR SERPLBLD CKD-EPI 2021: 79.1 ML/MIN/1.73
EOSINOPHIL # BLD AUTO: 0.2 10*3/MM3 (ref 0–0.4)
EOSINOPHIL NFR BLD AUTO: 2.8 % (ref 0.3–6.2)
ERYTHROCYTE [DISTWIDTH] IN BLOOD BY AUTOMATED COUNT: 11.8 % (ref 12.3–15.4)
GLOBULIN SER CALC-MCNC: 3.5 GM/DL
GLUCOSE SERPL-MCNC: 89 MG/DL (ref 65–99)
HBA1C MFR BLD: 5.7 % (ref 4.8–5.6)
HCT VFR BLD AUTO: 42.6 % (ref 37.5–51)
HDLC SERPL-MCNC: 36 MG/DL (ref 40–60)
HGB BLD-MCNC: 14.7 G/DL (ref 13–17.7)
IMM GRANULOCYTES # BLD AUTO: 0.03 10*3/MM3 (ref 0–0.05)
IMM GRANULOCYTES NFR BLD AUTO: 0.4 % (ref 0–0.5)
LDLC SERPL CALC-MCNC: 75 MG/DL (ref 0–100)
LYMPHOCYTES # BLD AUTO: 2.01 10*3/MM3 (ref 0.7–3.1)
LYMPHOCYTES NFR BLD AUTO: 28.3 % (ref 19.6–45.3)
MCH RBC QN AUTO: 30.8 PG (ref 26.6–33)
MCHC RBC AUTO-ENTMCNC: 34.5 G/DL (ref 31.5–35.7)
MCV RBC AUTO: 89.3 FL (ref 79–97)
MONOCYTES # BLD AUTO: 0.63 10*3/MM3 (ref 0.1–0.9)
MONOCYTES NFR BLD AUTO: 8.9 % (ref 5–12)
NEUTROPHILS # BLD AUTO: 4.19 10*3/MM3 (ref 1.7–7)
NEUTROPHILS NFR BLD AUTO: 59.2 % (ref 42.7–76)
NRBC BLD AUTO-RTO: 0 /100 WBC (ref 0–0.2)
PLATELET # BLD AUTO: 276 10*3/MM3 (ref 140–450)
POTASSIUM SERPL-SCNC: 4.5 MMOL/L (ref 3.5–5.2)
PROT SERPL-MCNC: 7.5 G/DL (ref 6–8.5)
PSA SERPL-MCNC: 0.86 NG/ML (ref 0–4)
RBC # BLD AUTO: 4.77 10*6/MM3 (ref 4.14–5.8)
SODIUM SERPL-SCNC: 143 MMOL/L (ref 136–145)
TRIGL SERPL-MCNC: 226 MG/DL (ref 0–150)
TSH SERPL DL<=0.005 MIU/L-ACNC: 1.08 UIU/ML (ref 0.27–4.2)
URATE SERPL-MCNC: 6.7 MG/DL (ref 3.4–7)
VLDLC SERPL CALC-MCNC: 37 MG/DL (ref 5–40)
WBC # BLD AUTO: 7.09 10*3/MM3 (ref 3.4–10.8)

## 2023-01-28 DIAGNOSIS — T78.40XA ALLERGY, INITIAL ENCOUNTER: ICD-10-CM

## 2023-01-30 RX ORDER — LEVOCETIRIZINE DIHYDROCHLORIDE 5 MG/1
TABLET, FILM COATED ORAL
Qty: 90 TABLET | Refills: 3 | Status: SHIPPED | OUTPATIENT
Start: 2023-01-30

## 2023-01-30 NOTE — TELEPHONE ENCOUNTER
Rx Refill Note  Requested Prescriptions     Pending Prescriptions Disp Refills   • levocetirizine (XYZAL) 5 MG tablet [Pharmacy Med Name: LEVOCETIRIZINE 5 MG TABLET] 90 tablet 0     Sig: TAKE ONE TABLET BY MOUTH EVERY EVENING      Last office visit with prescribing clinician: 11/9/2022   Last telemedicine visit with prescribing clinician:   Next office visit with prescribing clinician: 2/15/2023   {    Emely Najera MA  01/30/23, 10:23 EST

## 2023-02-01 NOTE — TELEPHONE ENCOUNTER
Caller: MESFIN WEST    Relationship: Emergency Contact    Best call back number: [phone]    Requested Prescriptions:   Requested Prescriptions     Pending Prescriptions Disp Refills   • tamsulosin (FLOMAX) 0.4 MG capsule 24 hr capsule 30 capsule      Sig: Take 1 capsule by mouth Daily.   • sertraline (ZOLOFT) 25 MG tablet       Sig: Take 1 tablet by mouth Daily.        Pharmacy where request should be sent: [facility] [id number] [address] [facility] [address] [place] [facility] [phone] [facility] [phone] FX     Additional details provided by patient: WOULD NEED REFILLS SENT TO PHARMACY TODAY PLEASE    Does the patient have less than a 3 day supply:  [x] Yes  [] No    Would you like a call back once the refill request has been completed: [x] Yes [] No    If the office needs to give you a call back, can they leave a voicemail: [x] Yes [] No    Lennox Pulido Rep   02/01/23 15:02 EST

## 2023-02-02 NOTE — TELEPHONE ENCOUNTER
Caller: MESFIN WEST    Relationship: Emergency Contact    Best call back number: [phone]    Requested Prescriptions:   Requested Prescriptions     Pending Prescriptions Disp Refills   • tamsulosin (FLOMAX) 0.4 MG capsule 24 hr capsule 30 capsule      Sig: Take 1 capsule by mouth Daily.   • sertraline (ZOLOFT) 25 MG tablet       Sig: Take 1 tablet by mouth Daily.        Pharmacy where request should be sent: [facility] [id number] [place] [address] [facility] AT [facility] [phone] [facility] [phone] FX     Additional details provided by patient:     COMPLETELY OUT OF MEDICATION    Does the patient have less than a 3 day supply:  [x] Yes  [] No    Would you like a call back once the refill request has been completed: [] Yes [x] No    If the office needs to give you a call back, can they leave a voicemail: [] Yes [x] No    Lennox Akbar Rep   02/02/23 14:45 EST

## 2023-02-03 NOTE — TELEPHONE ENCOUNTER
Rx Refill Note  Requested Prescriptions     Pending Prescriptions Disp Refills   • tamsulosin (FLOMAX) 0.4 MG capsule 24 hr capsule 30 capsule      Sig: Take 1 capsule by mouth Daily.   • sertraline (ZOLOFT) 25 MG tablet       Sig: Take 1 tablet by mouth Daily.      Last office visit with prescribing clinician: 11/9/2022     Next office visit with prescribing clinician: 2/15/2023                         Would you like a call back once the refill request has been completed: [] Yes [] No    If the office needs to give you a call back, can they leave a voicemail: [] Yes [] No    Kia Arizmendi MA  02/03/23, 09:50 EST     Please advise since you have not prescribed medication in the past

## 2023-02-06 RX ORDER — SERTRALINE HYDROCHLORIDE 25 MG/1
25 TABLET, FILM COATED ORAL DAILY
Qty: 30 TABLET | Refills: 3 | Status: SHIPPED | OUTPATIENT
Start: 2023-02-06

## 2023-02-06 RX ORDER — TAMSULOSIN HYDROCHLORIDE 0.4 MG/1
1 CAPSULE ORAL DAILY
Qty: 30 CAPSULE | Refills: 3 | Status: SHIPPED | OUTPATIENT
Start: 2023-02-06

## 2023-02-15 ENCOUNTER — OFFICE VISIT (OUTPATIENT)
Dept: INTERNAL MEDICINE | Facility: CLINIC | Age: 68
End: 2023-02-15
Payer: MEDICARE

## 2023-02-15 VITALS
OXYGEN SATURATION: 98 % | BODY MASS INDEX: 49.44 KG/M2 | SYSTOLIC BLOOD PRESSURE: 120 MMHG | HEART RATE: 70 BPM | WEIGHT: 315 LBS | HEIGHT: 67 IN | TEMPERATURE: 97 F | DIASTOLIC BLOOD PRESSURE: 80 MMHG

## 2023-02-15 DIAGNOSIS — I10 PRIMARY HYPERTENSION: ICD-10-CM

## 2023-02-15 DIAGNOSIS — G47.33 OBSTRUCTIVE SLEEP APNEA SYNDROME: ICD-10-CM

## 2023-02-15 DIAGNOSIS — M79.7 FIBROMYALGIA: ICD-10-CM

## 2023-02-15 DIAGNOSIS — R41.3 MEMORY LOSS: ICD-10-CM

## 2023-02-15 DIAGNOSIS — E55.9 VITAMIN D DEFICIENCY: ICD-10-CM

## 2023-02-15 DIAGNOSIS — N40.1 BENIGN PROSTATIC HYPERPLASIA WITH URINARY OBSTRUCTION: ICD-10-CM

## 2023-02-15 DIAGNOSIS — R23.3 BRUISES EASILY: ICD-10-CM

## 2023-02-15 DIAGNOSIS — E66.01 MORBID OBESITY WITH BMI OF 50.0-59.9, ADULT: ICD-10-CM

## 2023-02-15 DIAGNOSIS — N13.8 BENIGN PROSTATIC HYPERPLASIA WITH URINARY OBSTRUCTION: ICD-10-CM

## 2023-02-15 DIAGNOSIS — M1A.0720 IDIOPATHIC CHRONIC GOUT OF LEFT FOOT WITHOUT TOPHUS: ICD-10-CM

## 2023-02-15 DIAGNOSIS — R06.09 DOE (DYSPNEA ON EXERTION): ICD-10-CM

## 2023-02-15 DIAGNOSIS — F41.9 ANXIETY: ICD-10-CM

## 2023-02-15 DIAGNOSIS — F32.0 CURRENT MILD EPISODE OF MAJOR DEPRESSIVE DISORDER WITHOUT PRIOR EPISODE: ICD-10-CM

## 2023-02-15 DIAGNOSIS — M54.2 NECK PAIN: ICD-10-CM

## 2023-02-15 DIAGNOSIS — R53.83 OTHER FATIGUE: ICD-10-CM

## 2023-02-15 DIAGNOSIS — T78.40XA ALLERGY, INITIAL ENCOUNTER: Primary | ICD-10-CM

## 2023-02-15 PROBLEM — E66.9 OBESITY WITH SERIOUS COMORBIDITY: Status: RESOLVED | Noted: 2022-11-09 | Resolved: 2023-02-15

## 2023-02-15 PROCEDURE — 1170F FXNL STATUS ASSESSED: CPT | Performed by: INTERNAL MEDICINE

## 2023-02-15 PROCEDURE — G0009 ADMIN PNEUMOCOCCAL VACCINE: HCPCS | Performed by: INTERNAL MEDICINE

## 2023-02-15 PROCEDURE — G0439 PPPS, SUBSEQ VISIT: HCPCS | Performed by: INTERNAL MEDICINE

## 2023-02-15 PROCEDURE — 1159F MED LIST DOCD IN RCRD: CPT | Performed by: INTERNAL MEDICINE

## 2023-02-15 PROCEDURE — 1125F AMNT PAIN NOTED PAIN PRSNT: CPT | Performed by: INTERNAL MEDICINE

## 2023-02-15 PROCEDURE — 90677 PCV20 VACCINE IM: CPT | Performed by: INTERNAL MEDICINE
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
## 2023-02-18 DIAGNOSIS — R41.3 MEMORY LOSS: ICD-10-CM

## 2023-02-20 RX ORDER — DONEPEZIL HYDROCHLORIDE 10 MG/1
TABLET, FILM COATED ORAL
Qty: 90 TABLET | Refills: 0 | Status: SHIPPED | OUTPATIENT
Start: 2023-02-20

## 2023-02-20 NOTE — TELEPHONE ENCOUNTER
Rx Refill Note  Requested Prescriptions     Pending Prescriptions Disp Refills   • donepezil (ARICEPT) 10 MG tablet [Pharmacy Med Name: DONEPEZIL HCL 10 MG TABLET] 90 tablet 0     Sig: TAKE ONE TABLET BY MOUTH DAILY      Last office visit with prescribing clinician: 2/15/2023   Last telemedicine visit with prescribing clinician:   Next office visit with prescribing clinician: requested f/u 8/15/23  {    Emely Najera MA  02/20/23, 09:12 EST

## 2023-05-17 DIAGNOSIS — R41.3 MEMORY LOSS: ICD-10-CM

## 2023-05-17 RX ORDER — DONEPEZIL HYDROCHLORIDE 10 MG/1
TABLET, FILM COATED ORAL
Qty: 90 TABLET | Refills: 3 | Status: SHIPPED | OUTPATIENT
Start: 2023-05-17

## 2023-05-31 RX ORDER — SERTRALINE HYDROCHLORIDE 25 MG/1
TABLET, FILM COATED ORAL
Qty: 90 TABLET | Refills: 3 | Status: SHIPPED | OUTPATIENT
Start: 2023-05-31

## 2023-05-31 NOTE — TELEPHONE ENCOUNTER
Caller: MESFIN WEST    Relationship: Emergency Contact    Best call back number: [phone]    Requested Prescriptions:   Requested Prescriptions     Pending Prescriptions Disp Refills   • sertraline (ZOLOFT) 25 MG tablet [Pharmacy Med Name: SERTRALINE 25MG TABLETS] 30 tablet 3     Sig: TAKE 1 TABLET BY MOUTH EVERY DAY        Pharm[facility] where [facility] be sent: [id number] [place] [address] [address] AT [facility] [address] - [phone]  - [phone] FX     Last office visit with prescribing clinician: 2/15/2023   Last telemedicine visit with prescribing clinician: Visit date not found   Next office visit with prescribing clinician: Visit date not found     Additional details provided by patient: PATIENT HAS ONE DAY TABLET LEFT    Does the patient have less than a 3 day supply:  [x] Yes  [] No    Would you like a call back once the refill request has been completed: [x] Yes [] No    If the office needs to give you a call back, can they leave a voicemail: [x] Yes [] No    Lennox Pulido Rep   05/31/23 12:38 EDT

## 2023-06-19 RX ORDER — TAMSULOSIN HYDROCHLORIDE 0.4 MG/1
CAPSULE ORAL
Qty: 90 CAPSULE | Refills: 3 | Status: SHIPPED | OUTPATIENT
Start: 2023-06-19

## 2023-08-29 ENCOUNTER — OFFICE VISIT (OUTPATIENT)
Dept: INTERNAL MEDICINE | Facility: CLINIC | Age: 68
End: 2023-08-29
Payer: MEDICARE

## 2023-08-29 VITALS
HEIGHT: 67 IN | TEMPERATURE: 97 F | BODY MASS INDEX: 49.44 KG/M2 | SYSTOLIC BLOOD PRESSURE: 150 MMHG | DIASTOLIC BLOOD PRESSURE: 80 MMHG | HEART RATE: 60 BPM | OXYGEN SATURATION: 99 % | WEIGHT: 315 LBS

## 2023-08-29 DIAGNOSIS — F41.9 ANXIETY: ICD-10-CM

## 2023-08-29 DIAGNOSIS — E78.2 MIXED HYPERLIPIDEMIA: ICD-10-CM

## 2023-08-29 DIAGNOSIS — E74.89 OTHER SPECIFIED DISORDERS OF CARBOHYDRATE METABOLISM: ICD-10-CM

## 2023-08-29 DIAGNOSIS — G47.33 OBSTRUCTIVE SLEEP APNEA SYNDROME: ICD-10-CM

## 2023-08-29 DIAGNOSIS — N40.1 BENIGN PROSTATIC HYPERPLASIA WITH URINARY OBSTRUCTION: ICD-10-CM

## 2023-08-29 DIAGNOSIS — I10 PRIMARY HYPERTENSION: Primary | ICD-10-CM

## 2023-08-29 DIAGNOSIS — R41.3 MEMORY LOSS: ICD-10-CM

## 2023-08-29 DIAGNOSIS — F32.0 CURRENT MILD EPISODE OF MAJOR DEPRESSIVE DISORDER WITHOUT PRIOR EPISODE: ICD-10-CM

## 2023-08-29 DIAGNOSIS — N13.8 BENIGN PROSTATIC HYPERPLASIA WITH URINARY OBSTRUCTION: ICD-10-CM

## 2023-08-29 PROBLEM — R23.3 BRUISES EASILY: Status: RESOLVED | Noted: 2022-11-09 | Resolved: 2023-08-29

## 2023-08-29 PROBLEM — R53.83 OTHER FATIGUE: Status: RESOLVED | Noted: 2022-11-09 | Resolved: 2023-08-29

## 2023-08-29 NOTE — ASSESSMENT & PLAN NOTE
Stable on current medication and dosage. Will continue current management. Refill medication as necessary.  On tamsulosin  Follows with urology ([place])

## 2023-08-29 NOTE — ASSESSMENT & PLAN NOTE
Stable on current medication and dosage. Will continue current management. Refill medication as necessary.  On sertraline

## 2023-08-29 NOTE — ASSESSMENT & PLAN NOTE
Stable on current medication and dosage. Will continue current management. Refill medication as necessary.  On amlodipine and losartan
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
## 2023-08-29 NOTE — ASSESSMENT & PLAN NOTE
On donepezil  Stable on current medication and dosage. Will continue current management. Refill medication as necessary.

## 2023-08-31 DIAGNOSIS — R41.3 MEMORY LOSS: ICD-10-CM

## 2023-08-31 RX ORDER — DONEPEZIL HYDROCHLORIDE 10 MG/1
10 TABLET, FILM COATED ORAL DAILY
Qty: 90 TABLET | Refills: 1 | Status: SHIPPED | OUTPATIENT
Start: 2023-08-31

## 2023-08-31 NOTE — TELEPHONE ENCOUNTER
Rx Refill Note  Requested Prescriptions     Pending Prescriptions Disp Refills    donepezil (ARICEPT) 10 MG tablet 90 tablet 3     Sig: Take 1 tablet by mouth Daily.      Last office visit with prescribing clinician: 8/29/2023  Next office visit with prescribing clinician: 2/26/2024     Emely Najera MA  08/31/23, 15:31 EDT

## 2023-08-31 NOTE — TELEPHONE ENCOUNTER
Caller: MESFIN WEST    Relationship: Emergency Contact    Best call back number: [phone]     Requested Prescriptions:   Requested Prescriptions     Pending Prescriptions Disp Refills    donepezil (ARICEPT) 10 MG tablet 90 tablet 3     Sig: Take 1 tablet by mouth Daily.        Pharmacy where request should be sent: [id number] [place] [address] [address] AT [facility] [address] - [phone]  - [phone] FX     Last office visit with prescribing clinician: 8/29/2023   Last telemedicine visit with prescribing clinician: Visit date not found   Next office visit with prescribing clinician: 2/26/2024       Does the patient have less than a 3 day supply:  [x] Yes  [] No      Lennox Lester Rep   08/31/23 14:57 EDT

## 2023-10-23 DIAGNOSIS — I10 PRIMARY HYPERTENSION: ICD-10-CM

## 2023-10-23 RX ORDER — AMLODIPINE BESYLATE 5 MG/1
5 TABLET ORAL DAILY
Qty: 90 TABLET | Refills: 0 | Status: SHIPPED | OUTPATIENT
Start: 2023-10-23

## 2023-10-23 RX ORDER — AMLODIPINE BESYLATE 5 MG/1
5 TABLET ORAL DAILY
Qty: 30 TABLET | Refills: 0 | Status: SHIPPED | OUTPATIENT
Start: 2023-10-23 | End: 2023-10-23

## 2023-11-02 ENCOUNTER — TRANSCRIBE ORDERS (OUTPATIENT)
Dept: ADMINISTRATIVE | Facility: HOSPITAL | Age: 68
End: 2023-11-02
Payer: MEDICARE

## 2023-11-02 DIAGNOSIS — M54.12 BRACHIAL NEURITIS: Primary | ICD-10-CM

## 2023-11-03 ENCOUNTER — APPOINTMENT (OUTPATIENT)
Dept: CT IMAGING | Facility: HOSPITAL | Age: 68
End: 2023-11-03
Payer: COMMERCIAL

## 2023-11-03 ENCOUNTER — HOSPITAL ENCOUNTER (EMERGENCY)
Facility: HOSPITAL | Age: 68
Discharge: HOME | End: 2023-11-03
Attending: EMERGENCY MEDICINE
Payer: COMMERCIAL

## 2023-11-03 VITALS
HEIGHT: 68 IN | OXYGEN SATURATION: 98 % | RESPIRATION RATE: 18 BRPM | WEIGHT: 315 LBS | TEMPERATURE: 97.8 F | BODY MASS INDEX: 47.74 KG/M2 | DIASTOLIC BLOOD PRESSURE: 88 MMHG | HEART RATE: 76 BPM | SYSTOLIC BLOOD PRESSURE: 184 MMHG

## 2023-11-03 DIAGNOSIS — S22.058A OTHER CLOSED FRACTURE OF FIFTH THORACIC VERTEBRA, INITIAL ENCOUNTER: Primary | ICD-10-CM

## 2023-11-03 PROCEDURE — 72128 CT CHEST SPINE W/O DYE: CPT

## 2023-11-03 PROCEDURE — 99284 EMERGENCY DEPT VISIT MOD MDM: CPT

## 2023-11-03 RX ORDER — NALOXONE HYDROCHLORIDE 4 MG/.1ML
SPRAY NASAL
Qty: 2 EACH | Refills: 0 | Status: SHIPPED | OUTPATIENT
Start: 2023-11-03

## 2023-11-03 RX ORDER — HYDROCODONE BITARTRATE AND ACETAMINOPHEN 5; 325 MG/1; MG/1
1 TABLET ORAL ONCE
Status: COMPLETED | OUTPATIENT
Start: 2023-11-03 | End: 2023-11-03

## 2023-11-03 RX ORDER — OXYCODONE HYDROCHLORIDE 5 MG/1
5 TABLET ORAL ONCE
Status: COMPLETED | OUTPATIENT
Start: 2023-11-03 | End: 2023-11-03

## 2023-11-03 RX ORDER — OXYCODONE HYDROCHLORIDE AND ACETAMINOPHEN 5; 325 MG/1; MG/1
1 TABLET ORAL EVERY 6 HOURS PRN
Qty: 15 TABLET | Refills: 0 | Status: SHIPPED | OUTPATIENT
Start: 2023-11-03

## 2023-11-03 RX ORDER — CALCITONIN SALMON 200 [IU]/.09ML
1 SPRAY, METERED NASAL DAILY
Qty: 1.8 ML | Refills: 0 | Status: SHIPPED | OUTPATIENT
Start: 2023-11-03 | End: 2023-11-23

## 2023-11-03 RX ADMIN — HYDROCODONE BITARTRATE AND ACETAMINOPHEN 1 TABLET: 5; 325 TABLET ORAL at 19:37

## 2023-11-03 RX ADMIN — OXYCODONE HYDROCHLORIDE 5 MG: 5 TABLET ORAL at 20:38

## 2023-11-06 ENCOUNTER — TELEPHONE (OUTPATIENT)
Dept: INTERNAL MEDICINE | Facility: CLINIC | Age: 68
End: 2023-11-06
Payer: MEDICARE
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
## 2023-11-06 RX ORDER — TRAMADOL HYDROCHLORIDE 50 MG/1
25 TABLET ORAL EVERY 12 HOURS PRN
Qty: 3 TABLET | Refills: 0 | Status: SHIPPED | OUTPATIENT
Start: 2023-11-06
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
## 2023-11-16 RX ORDER — LOSARTAN POTASSIUM 50 MG/1
50 TABLET ORAL DAILY
Qty: 90 TABLET | Refills: 1 | Status: SHIPPED | OUTPATIENT
Start: 2023-11-16

## 2023-11-17 ENCOUNTER — HOSPITAL ENCOUNTER (EMERGENCY)
Facility: HOSPITAL | Age: 68
Discharge: HOME | End: 2023-11-18
Attending: EMERGENCY MEDICINE
Payer: MEDICARE

## 2023-11-17 DIAGNOSIS — R55 NEAR SYNCOPE: Primary | ICD-10-CM

## 2023-11-17 DIAGNOSIS — I44.1 SECOND DEGREE AV BLOCK, MOBITZ TYPE I: ICD-10-CM

## 2023-11-17 DIAGNOSIS — M79.89 LEG SWELLING: ICD-10-CM

## 2023-11-17 PROCEDURE — 99284 EMERGENCY DEPT VISIT MOD MDM: CPT

## 2023-11-17 RX ORDER — SODIUM CHLORIDE 0.9 % (FLUSH) 0.9 %
10 SYRINGE (ML) INJECTION AS NEEDED
Status: DISCONTINUED | OUTPATIENT
Start: 2023-11-17 | End: 2023-11-18 | Stop reason: HOSPADM

## 2023-11-18 ENCOUNTER — APPOINTMENT (OUTPATIENT)
Dept: GENERAL RADIOLOGY | Facility: HOSPITAL | Age: 68
End: 2023-11-18
Payer: MEDICARE

## 2023-11-18 ENCOUNTER — APPOINTMENT (OUTPATIENT)
Dept: CT IMAGING | Facility: HOSPITAL | Age: 68
End: 2023-11-18
Payer: MEDICARE

## 2023-11-18 VITALS
BODY MASS INDEX: 47.74 KG/M2 | OXYGEN SATURATION: 98 % | DIASTOLIC BLOOD PRESSURE: 77 MMHG | HEIGHT: 68 IN | RESPIRATION RATE: 18 BRPM | WEIGHT: 315 LBS | TEMPERATURE: 97.8 F | HEART RATE: 75 BPM | SYSTOLIC BLOOD PRESSURE: 135 MMHG

## 2023-11-18 LAB
ALBUMIN SERPL-MCNC: 3.7 G/DL (ref 3.5–5.2)
ALBUMIN/GLOB SERPL: 1 G/DL
ALP SERPL-CCNC: 97 U/L (ref 39–117)
ALT SERPL W P-5'-P-CCNC: 9 U/L (ref 1–41)
ANION GAP SERPL CALCULATED.3IONS-SCNC: 11.6 MMOL/L (ref 5–15)
AST SERPL-CCNC: 12 U/L (ref 1–40)
BASOPHILS # BLD AUTO: 0.05 10*3/MM3 (ref 0–0.2)
BASOPHILS NFR BLD AUTO: 0.6 % (ref 0–1.5)
BILIRUB SERPL-MCNC: 0.3 MG/DL (ref 0–1.2)
BUN SERPL-MCNC: 21 MG/DL (ref 8–23)
BUN/CREAT SERPL: 15.9 (ref 7–25)
CALCIUM SPEC-SCNC: 8.9 MG/DL (ref 8.6–10.5)
CHLORIDE SERPL-SCNC: 101 MMOL/L (ref 98–107)
CO2 SERPL-SCNC: 22.4 MMOL/L (ref 22–29)
CREAT SERPL-MCNC: 1.32 MG/DL (ref 0.76–1.27)
DEPRECATED RDW RBC AUTO: 43.1 FL (ref 37–54)
EGFRCR SERPLBLD CKD-EPI 2021: 58.4 ML/MIN/1.73
EOSINOPHIL # BLD AUTO: 0.27 10*3/MM3 (ref 0–0.4)
EOSINOPHIL NFR BLD AUTO: 3.5 % (ref 0.3–6.2)
ERYTHROCYTE [DISTWIDTH] IN BLOOD BY AUTOMATED COUNT: 12.7 % (ref 12.3–15.4)
GLOBULIN UR ELPH-MCNC: 3.6 GM/DL
GLUCOSE SERPL-MCNC: 106 MG/DL (ref 65–99)
HCT VFR BLD AUTO: 36.5 % (ref 37.5–51)
HGB BLD-MCNC: 12.6 G/DL (ref 13–17.7)
HOLD SPECIMEN: NORMAL
HOLD SPECIMEN: NORMAL
IMM GRANULOCYTES # BLD AUTO: 0.02 10*3/MM3 (ref 0–0.05)
IMM GRANULOCYTES NFR BLD AUTO: 0.3 % (ref 0–0.5)
LYMPHOCYTES # BLD AUTO: 1.65 10*3/MM3 (ref 0.7–3.1)
LYMPHOCYTES NFR BLD AUTO: 21.3 % (ref 19.6–45.3)
MAGNESIUM SERPL-MCNC: 1.9 MG/DL (ref 1.6–2.4)
MCH RBC QN AUTO: 31.8 PG (ref 26.6–33)
MCHC RBC AUTO-ENTMCNC: 34.5 G/DL (ref 31.5–35.7)
MCV RBC AUTO: 92.2 FL (ref 79–97)
MONOCYTES # BLD AUTO: 0.69 10*3/MM3 (ref 0.1–0.9)
MONOCYTES NFR BLD AUTO: 8.9 % (ref 5–12)
NEUTROPHILS NFR BLD AUTO: 5.08 10*3/MM3 (ref 1.7–7)
NEUTROPHILS NFR BLD AUTO: 65.4 % (ref 42.7–76)
NRBC BLD AUTO-RTO: 0 /100 WBC (ref 0–0.2)
PLATELET # BLD AUTO: 218 10*3/MM3 (ref 140–450)
PMV BLD AUTO: 9.3 FL (ref 6–12)
POTASSIUM SERPL-SCNC: 3.7 MMOL/L (ref 3.5–5.2)
PROT SERPL-MCNC: 7.3 G/DL (ref 6–8.5)
RBC # BLD AUTO: 3.96 10*6/MM3 (ref 4.14–5.8)
SODIUM SERPL-SCNC: 135 MMOL/L (ref 136–145)
TROPONIN T SERPL HS-MCNC: 12 NG/L
TROPONIN T SERPL HS-MCNC: 13 NG/L
WBC NRBC COR # BLD AUTO: 7.76 10*3/MM3 (ref 3.4–10.8)
WHOLE BLOOD HOLD COAG: NORMAL
WHOLE BLOOD HOLD SPECIMEN: NORMAL

## 2023-11-18 PROCEDURE — 80053 COMPREHEN METABOLIC PANEL: CPT | Performed by: EMERGENCY MEDICINE

## 2023-11-18 PROCEDURE — 93005 ELECTROCARDIOGRAM TRACING: CPT | Performed by: EMERGENCY MEDICINE

## 2023-11-18 PROCEDURE — 84484 ASSAY OF TROPONIN QUANT: CPT | Performed by: EMERGENCY MEDICINE

## 2023-11-18 PROCEDURE — 83735 ASSAY OF MAGNESIUM: CPT | Performed by: EMERGENCY MEDICINE

## 2023-11-18 PROCEDURE — 70450 CT HEAD/BRAIN W/O DYE: CPT

## 2023-11-18 PROCEDURE — 85025 COMPLETE CBC W/AUTO DIFF WBC: CPT | Performed by: EMERGENCY MEDICINE

## 2023-11-18 PROCEDURE — 36415 COLL VENOUS BLD VENIPUNCTURE: CPT

## 2023-11-18 PROCEDURE — 71045 X-RAY EXAM CHEST 1 VIEW: CPT
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
## 2023-11-20 ENCOUNTER — TELEPHONE (OUTPATIENT)
Dept: CARDIOLOGY | Facility: CLINIC | Age: 68
End: 2023-11-20
Payer: MEDICARE

## 2023-11-20 ENCOUNTER — OFFICE VISIT (OUTPATIENT)
Dept: INTERNAL MEDICINE | Facility: CLINIC | Age: 68
End: 2023-11-20
Payer: MEDICARE

## 2023-11-20 VITALS
BODY MASS INDEX: 47.74 KG/M2 | OXYGEN SATURATION: 98 % | RESPIRATION RATE: 16 BRPM | HEIGHT: 68 IN | SYSTOLIC BLOOD PRESSURE: 112 MMHG | DIASTOLIC BLOOD PRESSURE: 70 MMHG | WEIGHT: 315 LBS | HEART RATE: 73 BPM

## 2023-11-20 DIAGNOSIS — I10 PRIMARY HYPERTENSION: ICD-10-CM

## 2023-11-20 DIAGNOSIS — I49.8 OTHER CARDIAC ARRHYTHMIA: ICD-10-CM

## 2023-11-20 DIAGNOSIS — E66.01 MORBID OBESITY WITH BMI OF 50.0-59.9, ADULT: ICD-10-CM

## 2023-11-20 DIAGNOSIS — E78.49 OTHER HYPERLIPIDEMIA: ICD-10-CM

## 2023-11-20 DIAGNOSIS — R55 NEAR SYNCOPE: ICD-10-CM

## 2023-11-20 DIAGNOSIS — I44.1 MOBITZ TYPE 1 SECOND DEGREE AV BLOCK: Primary | ICD-10-CM

## 2023-11-20 PROBLEM — I49.9 ARRHYTHMIA: Status: ACTIVE | Noted: 2023-11-20

## 2023-11-20 PROCEDURE — 3078F DIAST BP <80 MM HG: CPT | Performed by: STUDENT IN AN ORGANIZED HEALTH CARE EDUCATION/TRAINING PROGRAM

## 2023-11-20 PROCEDURE — 99214 OFFICE O/P EST MOD 30 MIN: CPT | Performed by: STUDENT IN AN ORGANIZED HEALTH CARE EDUCATION/TRAINING PROGRAM

## 2023-11-20 PROCEDURE — 3074F SYST BP LT 130 MM HG: CPT | Performed by: STUDENT IN AN ORGANIZED HEALTH CARE EDUCATION/TRAINING PROGRAM

## 2023-11-20 RX ORDER — CETIRIZINE HYDROCHLORIDE 10 MG/1
10 TABLET ORAL DAILY
COMMUNITY

## 2023-11-20 RX ORDER — TOPIRAMATE 25 MG/1
25 TABLET ORAL DAILY
COMMUNITY
Start: 2023-10-18
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
## 2023-11-20 NOTE — TELEPHONE ENCOUNTER
Wife had called this am stating that patient went to ED for dizziness on Friday. Patient was told that he needs to see cardiology immediately due to an abnormal EKG. Wife states patient still has a little edema in legs. Please review ED note for scheduling time frame.

## 2023-11-20 NOTE — ASSESSMENT & PLAN NOTE
Stable at this time, monitor at home.  Inform clinic insulin any episodes of high blood pressure measurements greater than 160 or low blood pressure measurements less than 110/60

## 2023-11-20 NOTE — ASSESSMENT & PLAN NOTE
Patient's (Body mass index is 51.71 kg/m².) indicates that they are morbidly/severely obese (BMI > 40 or > 35 with obesity - related health condition) with health conditions that include obstructive sleep apnea and hypertension . Weight is unchanged. BMI  is above average; BMI management plan is completed. We discussed portion control and increasing exercise.

## 2023-11-20 NOTE — ASSESSMENT & PLAN NOTE
New undiagnosed problem, likely due to second-degree AV block type I.  Obtain labs and Holter monitor to determine etiology

## 2023-11-20 NOTE — ASSESSMENT & PLAN NOTE
Close follow-up with cardiology, Holter monitoring done.  We will wait for results.  Obtain labs including TSH

## 2023-11-21 LAB
CHOLEST SERPL-MCNC: 169 MG/DL (ref 0–200)
HDLC SERPL-MCNC: 39 MG/DL (ref 40–60)
LDLC SERPL CALC-MCNC: 104 MG/DL (ref 0–100)
TRIGL SERPL-MCNC: 147 MG/DL (ref 0–150)
TSH SERPL DL<=0.005 MIU/L-ACNC: 1.32 UIU/ML (ref 0.27–4.2)
VLDLC SERPL CALC-MCNC: 26 MG/DL (ref 5–40)

## 2023-11-22 DIAGNOSIS — R79.89 ELEVATED SERUM CREATININE: Primary | ICD-10-CM

## 2023-12-12 ENCOUNTER — TELEPHONE (OUTPATIENT)
Dept: INTERNAL MEDICINE | Facility: CLINIC | Age: 68
End: 2023-12-12
Payer: MEDICARE

## 2023-12-12 NOTE — TELEPHONE ENCOUNTER
Received call from [facility] with results of holter. 3.1 second pause on Day 15 at 7:05 am and a second degree AV block type 2 on Day 15 at 7:05 am. [phone].

## 2023-12-18 ENCOUNTER — TELEPHONE (OUTPATIENT)
Dept: CARDIOLOGY | Facility: CLINIC | Age: 68
End: 2023-12-18

## 2023-12-18 ENCOUNTER — OFFICE VISIT (OUTPATIENT)
Dept: CARDIOLOGY | Facility: CLINIC | Age: 68
End: 2023-12-18
Payer: MEDICARE

## 2023-12-18 VITALS
HEIGHT: 68 IN | OXYGEN SATURATION: 98 % | BODY MASS INDEX: 47.74 KG/M2 | WEIGHT: 315 LBS | SYSTOLIC BLOOD PRESSURE: 140 MMHG | HEART RATE: 69 BPM | DIASTOLIC BLOOD PRESSURE: 82 MMHG

## 2023-12-18 DIAGNOSIS — I10 PRIMARY HYPERTENSION: ICD-10-CM

## 2023-12-18 DIAGNOSIS — R55 NEAR SYNCOPE: Primary | ICD-10-CM

## 2023-12-18 DIAGNOSIS — E78.49 OTHER HYPERLIPIDEMIA: ICD-10-CM

## 2023-12-18 PROCEDURE — 1160F RVW MEDS BY RX/DR IN RCRD: CPT | Performed by: NURSE PRACTITIONER

## 2023-12-18 PROCEDURE — 99214 OFFICE O/P EST MOD 30 MIN: CPT | Performed by: NURSE PRACTITIONER

## 2023-12-18 PROCEDURE — 1159F MED LIST DOCD IN RCRD: CPT | Performed by: NURSE PRACTITIONER

## 2023-12-18 PROCEDURE — 3077F SYST BP >= 140 MM HG: CPT | Performed by: NURSE PRACTITIONER

## 2023-12-18 PROCEDURE — 3079F DIAST BP 80-89 MM HG: CPT | Performed by: NURSE PRACTITIONER
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
## 2023-12-18 NOTE — TELEPHONE ENCOUNTER
[facility] STAFF:    Will you please call this patient's wife and let her know I discussed the results of his cardiac monitor study with Dr. Chavarria.  He did have a conduction abnormality on his study, which I saw at their visit.  Even though it occur less than 1% of his monitored time and he has not had any recurrent episodes, Dr. Chavarria wanted him to be referred to EP to offer their thoughts.  They will discuss the results further with the patient and determine whether or not he should be considered for a permanent pacemaker.  I have put that referral in.  Someone will call from their office to get him scheduled.  Proceed with echo and follow up with Dr. Chavarria in Feb as scheduled.  Thanks!     DANIEL Bernal

## 2023-12-18 NOTE — TELEPHONE ENCOUNTER
Spoke with patient's spouse, information was given and understood. Also confirmed patient's echo appt while on the phone .
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
## 2024-01-04 ENCOUNTER — OFFICE VISIT (OUTPATIENT)
Dept: CARDIOLOGY | Facility: CLINIC | Age: 69
End: 2024-01-04
Payer: MEDICARE

## 2024-01-04 VITALS
WEIGHT: 315 LBS | SYSTOLIC BLOOD PRESSURE: 140 MMHG | DIASTOLIC BLOOD PRESSURE: 68 MMHG | BODY MASS INDEX: 47.74 KG/M2 | HEIGHT: 68 IN | HEART RATE: 66 BPM | OXYGEN SATURATION: 96 %

## 2024-01-04 DIAGNOSIS — I44.1 MOBITZ TYPE 1 SECOND DEGREE AV BLOCK: Chronic | ICD-10-CM

## 2024-01-04 DIAGNOSIS — I10 PRIMARY HYPERTENSION: Chronic | ICD-10-CM

## 2024-01-04 DIAGNOSIS — R55 NEAR SYNCOPE: Primary | ICD-10-CM

## 2024-01-04 RX ORDER — VITAMIN B COMPLEX
1 CAPSULE ORAL DAILY
COMMUNITY

## 2024-01-04 RX ORDER — ACETAMINOPHEN 500 MG
1000 TABLET ORAL EVERY 6 HOURS PRN
COMMUNITY

## 2024-01-04 RX ORDER — NIACIN 500 MG/1
500 TABLET, EXTENDED RELEASE ORAL NIGHTLY
COMMUNITY

## 2024-01-04 RX ORDER — BUMETANIDE 1 MG/1
1 TABLET ORAL AS NEEDED
COMMUNITY

## 2024-01-04 RX ORDER — POTASSIUM CHLORIDE 750 MG/1
10 TABLET, FILM COATED, EXTENDED RELEASE ORAL AS NEEDED
COMMUNITY

## 2024-01-04 RX ORDER — SODIUM PHOSPHATE,MONO-DIBASIC 19G-7G/118
1 ENEMA (ML) RECTAL 2 TIMES DAILY WITH MEALS
COMMUNITY
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
## 2024-01-04 NOTE — ASSESSMENT & PLAN NOTE
Patient is AV block likely secondary to sleep apnea as well as pain  -No immediate indication for pacemaker implantation at this time -continue to monitor

## 2024-01-10 ENCOUNTER — TELEPHONE (OUTPATIENT)
Dept: CARDIOLOGY | Facility: CLINIC | Age: 69
End: 2024-01-10
Payer: MEDICARE

## 2024-01-10 NOTE — TELEPHONE ENCOUNTER
Mayda, patient's spouse called the office returning Tere's call. She had attempted to contact patient in regards to echo results. Informed patient's spouse that the echo results were normal.

## 2024-02-04 DIAGNOSIS — T78.40XA ALLERGY, INITIAL ENCOUNTER: ICD-10-CM

## 2024-02-05 RX ORDER — LEVOCETIRIZINE DIHYDROCHLORIDE 5 MG/1
5 TABLET, FILM COATED ORAL EVERY EVENING
Qty: 90 TABLET | Refills: 1 | Status: SHIPPED | OUTPATIENT
Start: 2024-02-05

## 2024-02-14 ENCOUNTER — OFFICE VISIT (OUTPATIENT)
Dept: CARDIOLOGY | Facility: CLINIC | Age: 69
End: 2024-02-14
Payer: MEDICARE

## 2024-02-14 VITALS
SYSTOLIC BLOOD PRESSURE: 140 MMHG | DIASTOLIC BLOOD PRESSURE: 68 MMHG | HEIGHT: 68 IN | OXYGEN SATURATION: 99 % | HEART RATE: 64 BPM | BODY MASS INDEX: 47.74 KG/M2 | RESPIRATION RATE: 18 BRPM | WEIGHT: 315 LBS

## 2024-02-14 DIAGNOSIS — I10 PRIMARY HYPERTENSION: ICD-10-CM

## 2024-02-14 PROCEDURE — 3077F SYST BP >= 140 MM HG: CPT | Performed by: INTERNAL MEDICINE

## 2024-02-14 PROCEDURE — 99213 OFFICE O/P EST LOW 20 MIN: CPT | Performed by: INTERNAL MEDICINE

## 2024-02-14 PROCEDURE — 3078F DIAST BP <80 MM HG: CPT | Performed by: INTERNAL MEDICINE

## 2024-02-14 RX ORDER — AMLODIPINE BESYLATE 5 MG/1
5 TABLET ORAL DAILY
Qty: 90 TABLET | Refills: 3 | Status: SHIPPED | OUTPATIENT
Start: 2024-02-14

## 2024-02-14 NOTE — PROGRESS NOTES
[facility] Office Follow Up Note    Zbigniew Andrews  [id number]  2025    Primary Care Provider: Mackenzie Dimas MD    Chief Complaint: Routine follow-up    History of Present Illness:   Mr. Zbigniew Andrews is a 68 y.o. male who presents to the Cardiology Clinic for routine follow-up.  The patient has a past medical history significant for hypertension, gout, BPH, and obesity with a BMI 50 kg/m².  He does not have any significant past cardiac history.  He reports undergoing ischemic evaluation including a coronary angiogram in [place] in , with no significant CAD at that time.  Today, the patient reports he is doing well from a cardiac standpoint.  He denies any significant chest pain or exertional chest discomfort.  No significant exertional dyspnea above baseline.  He has been monitoring his blood pressure at home, with a systolic BP typically being under 140 mmHg.  He is tolerating his current antihypertensive medications without significant difficulty.  No new complaints today.      Past Cardiac Testin. Last Coronary Angio: , reportedly no significant CAD  2. Prior Stress Testing: None known  3. Last Echo: Records pending  4. Prior Holter Monitor: None       Review of Systems:   Review of Systems   Constitutional:  Negative for activity change, appetite change, chills, diaphoresis, fatigue, fever, unexpected weight gain and unexpected weight loss.   Eyes:  Negative for blurred vision and double vision.   Respiratory:  Negative for cough, chest tightness, shortness of breath and wheezing.    Cardiovascular:  Negative for chest pain, palpitations and leg swelling.   Gastrointestinal:  Negative for abdominal pain, anal bleeding, blood in stool and GERD.   Endocrine: Negative for cold intolerance and heat intolerance.   Genitourinary:  Negative for hematuria.   Neurological:  Negative for dizziness, syncope, weakness and light-headedness.    Hematological:  Does not bruise/bleed easily.   Psychiatric/Behavioral:  Negative for depressed mood and stress. The patient is not nervous/anxious.        I have reviewed and/or updated the patient's past medical, past surgical, family, social history, problem list and allergies as appropriate.     Medications:     Current Outpatient Medications:     acetaminophen (TYLENOL) 500 MG tablet, Take 2 tablets by mouth Every 6 (Six) Hours As Needed for Mild Pain., Disp: , Rfl:     amLODIPine (NORVASC) 5 MG tablet, Take 1 tablet by mouth Daily., Disp: 90 tablet, Rfl: 3    Ascorbic Acid (VITAMIN C PO), Take  by mouth Daily., Disp: , Rfl:     B Complex Vitamins (vitamin b complex) capsule capsule, Take 1 capsule by mouth Daily., Disp: , Rfl:     bumetanide (BUMEX) 1 MG tablet, Take 1 tablet by mouth As Needed., Disp: , Rfl:     Cholecalciferol 50 MCG (2000 UT) tablet, Take 2 tablets by mouth Daily., Disp: , Rfl:     Cinnamon 500 MG capsule, Take 1 capsule by mouth Daily., Disp: , Rfl:     donepezil (ARICEPT) 10 MG tablet, Take 1 tablet by mouth Daily., Disp: 90 tablet, Rfl: 1    fluticasone (FLONASE) 50 MCG/ACT nasal spray, 2 sprays into the nostril(s) as directed by provider Daily., Disp: , Rfl:     gabapentin (NEURONTIN) 600 MG tablet, Take 1 tablet by mouth 2 (Two) Times a Day., Disp: , Rfl:     glucosamine-chondroitin 500-400 MG capsule capsule, Take 1 capsule by mouth 2 (Two) Times a Day With Meals., Disp: , Rfl:     guaiFENesin (Mucinex) 600 MG 12 hr tablet, Take 400 mg by mouth Daily As Needed., Disp: , Rfl:     levocetirizine (XYZAL) 5 MG tablet, TAKE 1 TABLET BY MOUTH EVERY EVENING, Disp: 90 tablet, Rfl: 1    Liraglutide (VICTOZA) 18 MG/3ML solution pen-injector injection, Inject 1.8 mg under the skin into the appropriate area as directed Daily., Disp: , Rfl:     losartan (COZAAR) 50 MG tablet, TAKE 1 TABLET BY MOUTH EVERY DAY, Disp: 90 tablet, Rfl: 1    methocarbamol (ROBAXIN) 500 MG tablet, Take 2 tablets by  "mouth 3 (Three) Times a Day., Disp: , Rfl:     Misc Natural Products (BLACK CHERRY CONCENTRATE PO), Take  by mouth 2 (two) times a day., Disp: , Rfl:     Multiple Vitamins-Minerals (ZINC PO), Take  by mouth., Disp: , Rfl:     multivitamin (THERAGRAN) tablet tablet, Daily., Disp: , Rfl:     niacin (NIASPAN) 500 MG CR tablet, Take 1 tablet by mouth Every Night., Disp: , Rfl:     NON FORMULARY, , Disp: , Rfl:     potassium chloride 10 MEQ CR tablet, Take 1 tablet by mouth As Needed. With bumex, Disp: , Rfl:     sertraline (ZOLOFT) 25 MG tablet, TAKE 1 TABLET BY MOUTH EVERY DAY, Disp: 90 tablet, Rfl: 3    tamsulosin (FLOMAX) 0.4 MG capsule 24 hr capsule, TAKE 1 CAPSULE BY MOUTH EVERY DAY, Disp: 90 capsule, Rfl: 3    topiramate (TOPAMAX) 25 MG tablet, Take 1 tablet by mouth Daily., Disp: , Rfl:     traMADol (ULTRAM) 50 MG tablet, Take 0.5 tablets by mouth Every 12 (Twelve) Hours As Needed for Moderate Pain. (Patient taking differently: Take 0.5 tablets by mouth Every 8 (Eight) Hours As Needed for Moderate Pain.), Disp: 3 tablet, Rfl: 0    Turmeric (QC TUMERIC COMPLEX PO), Take  by mouth Daily., Disp: , Rfl:     atorvastatin (LIPITOR) 20 MG tablet, Take 1 tablet by mouth Daily., Disp: 90 tablet, Rfl: 1    Physical Exam:  Vital Signs:   Vitals:    02/14/24 1148   BP: 140/68   BP Location: Right arm   Patient Position: Sitting   Pulse: 64   Resp: 18   SpO2: 99%   Weight: (!) 148 kg (326 lb)   Height: 172.7 cm (67.99\")       Physical Exam  Constitutional:       General: He is not in acute distress.     Appearance: He is obese. He is not diaphoretic.   HENT:      Head: Normocephalic and atraumatic.   Cardiovascular:      Rate and Rhythm: Normal rate and regular rhythm.      Heart sounds: No murmur heard.  Pulmonary:      Effort: Pulmonary effort is normal. No respiratory distress.      Breath sounds: Normal breath sounds. No stridor. No wheezing, rhonchi or rales.   Abdominal:      General: Bowel sounds are normal. There is " no distension.      Palpations: Abdomen is soft.      Tenderness: There is no abdominal tenderness. There is no guarding or rebound.   Musculoskeletal:         General: No swelling. Normal range of motion.      Cervical back: Neck supple. No tenderness.   Skin:     General: Skin is warm and dry.   Neurological:      General: No focal deficit present.      Mental Status: He is alert and oriented to person, place, and time.   Psychiatric:         Mood and Affect: Mood normal.         Behavior: Behavior normal.         Results Review:   I reviewed the patient's new clinical results.  I personally viewed and interpreted the patient's EKG/Telemetry data        Assessment / Plan:     1.  Hypertension  -- Mild hypertension today, systolic BP typically less than 140 mmHg on home monitoring  --Continue current antihypertensives  --If systolic BP sustaining greater than 140 mmHg, will consider uptitration of Norvasc  --Follow-up in 1 year, sooner if required     2.  Morbid obesity with BMI of 50.0-59.9  -- Weight loss advised    Follow Up:   Return in about 1 year (around 2/14/2025).      Thank you for allowing me to participate in the care of your patient. Please to not hesitate to contact me with additional questions or concerns.     ELEAZAR Chavarria MD  Interventional Cardiology   02/14/2024  11:58 EST

## 2024-02-22 ENCOUNTER — LAB (OUTPATIENT)
Dept: LAB | Facility: HOSPITAL | Age: 69
End: 2024-02-22
Payer: MEDICARE

## 2024-02-22 ENCOUNTER — TRANSCRIBE ORDERS (OUTPATIENT)
Dept: LAB | Facility: HOSPITAL | Age: 69
End: 2024-02-22
Payer: MEDICARE

## 2024-02-22 DIAGNOSIS — G47.33 OBSTRUCTIVE SLEEP APNEA SYNDROME: ICD-10-CM

## 2024-02-22 DIAGNOSIS — R22.2 MASS, CHEST: Primary | ICD-10-CM

## 2024-02-22 DIAGNOSIS — R22.2 MASS, CHEST: ICD-10-CM

## 2024-02-22 DIAGNOSIS — R41.3 MEMORY LOSS: ICD-10-CM

## 2024-02-22 DIAGNOSIS — E74.89 OTHER SPECIFIED DISORDERS OF CARBOHYDRATE METABOLISM: ICD-10-CM

## 2024-02-22 DIAGNOSIS — F41.9 ANXIETY: ICD-10-CM

## 2024-02-22 DIAGNOSIS — I10 PRIMARY HYPERTENSION: ICD-10-CM

## 2024-02-22 DIAGNOSIS — E78.2 MIXED HYPERLIPIDEMIA: ICD-10-CM

## 2024-02-22 DIAGNOSIS — N13.8 BENIGN PROSTATIC HYPERPLASIA WITH URINARY OBSTRUCTION: ICD-10-CM

## 2024-02-22 DIAGNOSIS — N40.1 BENIGN PROSTATIC HYPERPLASIA WITH URINARY OBSTRUCTION: ICD-10-CM

## 2024-02-22 DIAGNOSIS — F32.0 CURRENT MILD EPISODE OF MAJOR DEPRESSIVE DISORDER WITHOUT PRIOR EPISODE: ICD-10-CM

## 2024-02-22 LAB
ALBUMIN SERPL-MCNC: 4.3 G/DL (ref 3.5–5.2)
ALBUMIN/GLOB SERPL: 1.4 G/DL
ALP SERPL-CCNC: 92 U/L (ref 39–117)
ALT SERPL W P-5'-P-CCNC: 12 U/L (ref 1–41)
ANION GAP SERPL CALCULATED.3IONS-SCNC: 11 MMOL/L (ref 5–15)
APTT PPP: 39.2 SECONDS (ref 23.5–35.5)
AST SERPL-CCNC: 16 U/L (ref 1–40)
BASOPHILS # BLD AUTO: 0.04 10*3/MM3 (ref 0–0.2)
BASOPHILS NFR BLD AUTO: 0.7 % (ref 0–1.5)
BILIRUB SERPL-MCNC: 0.4 MG/DL (ref 0–1.2)
BUN SERPL-MCNC: 25 MG/DL (ref 8–23)
BUN/CREAT SERPL: 18.9 (ref 7–25)
CALCIUM SPEC-SCNC: 9.4 MG/DL (ref 8.6–10.5)
CHLORIDE SERPL-SCNC: 105 MMOL/L (ref 98–107)
CHOLEST SERPL-MCNC: 174 MG/DL (ref 0–200)
CO2 SERPL-SCNC: 26 MMOL/L (ref 22–29)
CREAT SERPL-MCNC: 1.32 MG/DL (ref 0.76–1.27)
DEPRECATED RDW RBC AUTO: 40 FL (ref 37–54)
EGFRCR SERPLBLD CKD-EPI 2021: 58.4 ML/MIN/1.73
EOSINOPHIL # BLD AUTO: 0.18 10*3/MM3 (ref 0–0.4)
EOSINOPHIL NFR BLD AUTO: 3.1 % (ref 0.3–6.2)
ERYTHROCYTE [DISTWIDTH] IN BLOOD BY AUTOMATED COUNT: 12 % (ref 12.3–15.4)
GLOBULIN UR ELPH-MCNC: 3.1 GM/DL
GLUCOSE SERPL-MCNC: 82 MG/DL (ref 65–99)
HBA1C MFR BLD: 4.9 % (ref 4.8–5.6)
HCT VFR BLD AUTO: 40.7 % (ref 37.5–51)
HDLC SERPL-MCNC: 44 MG/DL (ref 40–60)
HGB BLD-MCNC: 13.6 G/DL (ref 13–17.7)
IMM GRANULOCYTES # BLD AUTO: 0.01 10*3/MM3 (ref 0–0.05)
IMM GRANULOCYTES NFR BLD AUTO: 0.2 % (ref 0–0.5)
INR PPP: 1.05 (ref 0.9–1.1)
LDLC SERPL CALC-MCNC: 107 MG/DL (ref 0–100)
LDLC/HDLC SERPL: 2.36 {RATIO}
LYMPHOCYTES # BLD AUTO: 1.37 10*3/MM3 (ref 0.7–3.1)
LYMPHOCYTES NFR BLD AUTO: 23.4 % (ref 19.6–45.3)
MCH RBC QN AUTO: 30.6 PG (ref 26.6–33)
MCHC RBC AUTO-ENTMCNC: 33.4 G/DL (ref 31.5–35.7)
MCV RBC AUTO: 91.7 FL (ref 79–97)
MONOCYTES # BLD AUTO: 0.53 10*3/MM3 (ref 0.1–0.9)
MONOCYTES NFR BLD AUTO: 9.1 % (ref 5–12)
NEUTROPHILS NFR BLD AUTO: 3.72 10*3/MM3 (ref 1.7–7)
NEUTROPHILS NFR BLD AUTO: 63.5 % (ref 42.7–76)
NRBC BLD AUTO-RTO: 0 /100 WBC (ref 0–0.2)
PLATELET # BLD AUTO: 221 10*3/MM3 (ref 140–450)
PMV BLD AUTO: 11.3 FL (ref 6–12)
POTASSIUM SERPL-SCNC: 5.2 MMOL/L (ref 3.5–5.2)
PROT SERPL-MCNC: 7.4 G/DL (ref 6–8.5)
PROTHROMBIN TIME: 14.2 SECONDS (ref 12.3–15.1)
RBC # BLD AUTO: 4.44 10*6/MM3 (ref 4.14–5.8)
SODIUM SERPL-SCNC: 142 MMOL/L (ref 136–145)
TRIGL SERPL-MCNC: 131 MG/DL (ref 0–150)
TSH SERPL DL<=0.05 MIU/L-ACNC: 1 UIU/ML (ref 0.27–4.2)
VLDLC SERPL-MCNC: 23 MG/DL (ref 5–40)
WBC NRBC COR # BLD AUTO: 5.85 10*3/MM3 (ref 3.4–10.8)

## 2024-02-22 PROCEDURE — 83036 HEMOGLOBIN GLYCOSYLATED A1C: CPT

## 2024-02-22 PROCEDURE — 84443 ASSAY THYROID STIM HORMONE: CPT

## 2024-02-22 PROCEDURE — 80053 COMPREHEN METABOLIC PANEL: CPT

## 2024-02-22 PROCEDURE — 36415 COLL VENOUS BLD VENIPUNCTURE: CPT

## 2024-02-22 PROCEDURE — 85610 PROTHROMBIN TIME: CPT

## 2024-02-22 PROCEDURE — 85025 COMPLETE CBC W/AUTO DIFF WBC: CPT

## 2024-02-22 PROCEDURE — 85730 THROMBOPLASTIN TIME PARTIAL: CPT

## 2024-02-22 PROCEDURE — 80061 LIPID PANEL: CPT

## 2024-02-26 ENCOUNTER — OFFICE VISIT (OUTPATIENT)
Dept: INTERNAL MEDICINE | Facility: CLINIC | Age: 69
End: 2024-02-26
Payer: MEDICARE

## 2024-02-26 VITALS
HEART RATE: 68 BPM | SYSTOLIC BLOOD PRESSURE: 117 MMHG | BODY MASS INDEX: 47.74 KG/M2 | HEIGHT: 68 IN | OXYGEN SATURATION: 95 % | RESPIRATION RATE: 16 BRPM | WEIGHT: 315 LBS | DIASTOLIC BLOOD PRESSURE: 55 MMHG

## 2024-02-26 DIAGNOSIS — E66.01 MORBID OBESITY WITH BMI OF 50.0-59.9, ADULT: ICD-10-CM

## 2024-02-26 DIAGNOSIS — Z11.59 ENCOUNTER FOR HEPATITIS C SCREENING TEST FOR LOW RISK PATIENT: ICD-10-CM

## 2024-02-26 DIAGNOSIS — I44.1 MOBITZ TYPE 1 SECOND DEGREE AV BLOCK: ICD-10-CM

## 2024-02-26 DIAGNOSIS — F41.9 ANXIETY: ICD-10-CM

## 2024-02-26 DIAGNOSIS — E55.9 VITAMIN D DEFICIENCY: ICD-10-CM

## 2024-02-26 DIAGNOSIS — R41.3 MEMORY LOSS: ICD-10-CM

## 2024-02-26 DIAGNOSIS — I10 PRIMARY HYPERTENSION: ICD-10-CM

## 2024-02-26 DIAGNOSIS — G47.33 OBSTRUCTIVE SLEEP APNEA SYNDROME: ICD-10-CM

## 2024-02-26 DIAGNOSIS — R73.9 HYPERGLYCEMIA: ICD-10-CM

## 2024-02-26 DIAGNOSIS — M1A.0720 IDIOPATHIC CHRONIC GOUT OF LEFT FOOT WITHOUT TOPHUS: ICD-10-CM

## 2024-02-26 DIAGNOSIS — N40.1 BENIGN PROSTATIC HYPERPLASIA WITH URINARY OBSTRUCTION: ICD-10-CM

## 2024-02-26 DIAGNOSIS — N13.8 BENIGN PROSTATIC HYPERPLASIA WITH URINARY OBSTRUCTION: ICD-10-CM

## 2024-02-26 DIAGNOSIS — F32.0 CURRENT MILD EPISODE OF MAJOR DEPRESSIVE DISORDER WITHOUT PRIOR EPISODE: ICD-10-CM

## 2024-02-26 DIAGNOSIS — Z12.5 PROSTATE CANCER SCREENING: ICD-10-CM

## 2024-02-26 DIAGNOSIS — E78.49 OTHER HYPERLIPIDEMIA: Primary | ICD-10-CM

## 2024-02-26 PROBLEM — R06.09 DOE (DYSPNEA ON EXERTION): Status: RESOLVED | Noted: 2022-11-09 | Resolved: 2024-02-26

## 2024-02-26 PROBLEM — I49.9 ARRHYTHMIA: Status: RESOLVED | Noted: 2023-11-20 | Resolved: 2024-02-26

## 2024-02-26 PROBLEM — R55 NEAR SYNCOPE: Status: RESOLVED | Noted: 2023-11-20 | Resolved: 2024-02-26

## 2024-02-26 RX ORDER — ATORVASTATIN CALCIUM 20 MG/1
20 TABLET, FILM COATED ORAL DAILY
Qty: 90 TABLET | Refills: 1 | Status: SHIPPED | OUTPATIENT
Start: 2024-02-26

## 2024-02-26 NOTE — ASSESSMENT & PLAN NOTE
Stable on current medication and dosage. Will continue current management. Refill medication as necessary.  Sertraline 25 daily

## 2024-02-26 NOTE — PROGRESS NOTES
The ABCs of the Annual Wellness Visit  Subsequent Medicare Wellness Visit    Subjective    Zbigniew Andrews is a 68 y.o. male who presents for a Subsequent Medicare Wellness Visit.    HTN: on amlodipine, losartan,has lost weight, noted to be improving but does not check at home.  Blood pressure somehow low today but asymptomatic.  Denies any symptoms of headaches, dizziness, syncope or feeling like faint.  Dementia: on donepezil (started by MD in [place] 2010), positive family history of Alzheimer's disease  Anxiety & depression: on sertraline 25mg daily  BPH: on tamsulosin, stable symptoms MARILY: on bipap at home nightly   Obesity: on Victoza follows with Dr stovall, bariatric doctor in [place]  BLE edema: on as needed bumex with potassium chloride  CKD last creatinine 1.32, stable advised to monitor weight, blood pressure and sugar  Mobitz type I second-degree AV block: Asymptomatic at this time, not a candidate for pacemaker at this time  Gout: No recent flareups, on as needed NSAIDs    Sees pain clinic: on gabapentin, tramadol 50mg for sleep and methocarbamol due to hx of several fractures and concussion (2021)  Follows with dermatology, hx of skin cancer  Hx of lipomas, general surgeon in [place] plans to remove them     Colonoscopy: 2020, Dr. Stovall (surgeon in [place]) normal  Nonsmoker  The following portions of the patient's history were reviewed and   updated as appropriate: allergies, current medications, past family history, past medical history, past social history, past surgical history, and problem list.    Compared to one year ago, the patient feels his physical   health is better.    Compared to one year ago, the patient feels his mental   health is better.    Recent Hospitalizations:  He was not admitted to the hospital during the last year.       Current Medical Providers:  Patient Care Team:  Mackenzie Dimas MD as PCP - General (Family Medicine)  Katina Cote APRN as Referring Physician  (Family Medicine)    Outpatient Medications Prior to Visit   Medication Sig Dispense Refill    acetaminophen (TYLENOL) 500 MG tablet Take 2 tablets by mouth Every 6 (Six) Hours As Needed for Mild Pain.      amLODIPine (NORVASC) 5 MG tablet Take 1 tablet by mouth Daily. 90 tablet 3    Ascorbic Acid (VITAMIN C PO) Take  by mouth Daily.      B Complex Vitamins (vitamin b complex) capsule capsule Take 1 capsule by mouth Daily.      bumetanide (BUMEX) 1 MG tablet Take 1 tablet by mouth As Needed.      Cholecalciferol 50 MCG (2000 UT) tablet Take 2 tablets by mouth Daily.      Cinnamon 500 MG capsule Take 1 capsule by mouth Daily.      donepezil (ARICEPT) 10 MG tablet Take 1 tablet by mouth Daily. 90 tablet 1    fluticasone (FLONASE) 50 MCG/ACT nasal spray 2 sprays into the nostril(s) as directed by provider Daily.      gabapentin (NEURONTIN) 600 MG tablet Take 1 tablet by mouth 2 (Two) Times a Day.      glucosamine-chondroitin 500-400 MG capsule capsule Take 1 capsule by mouth 2 (Two) Times a Day With Meals.      guaiFENesin (Mucinex) 600 MG 12 hr tablet Take 400 mg by mouth Daily As Needed.      levocetirizine (XYZAL) 5 MG tablet TAKE 1 TABLET BY MOUTH EVERY EVENING 90 tablet 1    Liraglutide (VICTOZA) 18 MG/3ML solution pen-injector injection Inject 1.8 mg under the skin into the appropriate area as directed Daily.      losartan (COZAAR) 50 MG tablet TAKE 1 TABLET BY MOUTH EVERY DAY 90 tablet 1    methocarbamol (ROBAXIN) 500 MG tablet Take 2 tablets by mouth 3 (Three) Times a Day.      Misc Natural Products (BLACK CHERRY CONCENTRATE PO) Take  by mouth 2 (two) times a day.      Multiple Vitamins-Minerals (ZINC PO) Take  by mouth.      multivitamin (THERAGRAN) tablet tablet Daily.      niacin (NIASPAN) 500 MG CR tablet Take 1 tablet by mouth Every Night.      NON FORMULARY       potassium chloride 10 MEQ CR tablet Take 1 tablet by mouth As Needed. With bumex      sertraline (ZOLOFT) 25 MG tablet TAKE 1 TABLET BY MOUTH  EVERY DAY 90 tablet 3    tamsulosin (FLOMAX) 0.4 MG capsule 24 hr capsule TAKE 1 CAPSULE BY MOUTH EVERY DAY 90 capsule 3    topiramate (TOPAMAX) 25 MG tablet Take 1 tablet by mouth Daily.      traMADol (ULTRAM) 50 MG tablet Take 0.5 tablets by mouth Every 12 (Twelve) Hours As Needed for Moderate Pain. (Patient taking differently: Take 0.5 tablets by mouth Every 8 (Eight) Hours As Needed for Moderate Pain.) 3 tablet 0    Turmeric (QC TUMERIC COMPLEX PO) Take  by mouth Daily.      cetirizine (zyrTEC) 10 MG tablet Take 1 tablet by mouth Daily. (Patient not taking: Reported on 2/26/2024)       No facility-administered medications prior to visit.       Opioid medication/s are on active medication list.  and I have evaluated his active treatment plan and pain score trends (see table).  There were no vitals filed for this visit.  I have reviewed the chart for potential of high risk medication and harmful drug interactions in the elderly.          Aspirin is not on active medication list.  Aspirin use is not indicated based on review of current medical condition/s. Risk of harm outweighs potential benefits.  .    Patient Active Problem List   Diagnosis    Morbid obesity with BMI of 50.0-59.9, adult    Primary hypertension    Vitamin D deficiency    Memory loss    Allergy    Depression    Anxiety    Benign prostatic hyperplasia with lower urinary tract symptoms    History of nephrolithiasis    Neck pain    Fibromyalgia    History of repair of left rotator cuff    Idiopathic chronic gout of left foot without tophus    History of bariatric surgery    Obstructive sleep apnea syndrome    Mobitz type 1 second degree AV block    Other hyperlipidemia    Hyperglycemia     Advance Care Planning   Advance Care Planning     Advance Directive is on file.  ACP discussion was held with the patient during this visit. Patient has an advance directive in EMR which is still valid.      Objective    Vitals:    02/26/24 1426   BP: 117/55  "  Pulse: 68   Resp: 16   SpO2: 95%   Weight: (!) 151 kg (332 lb)   Height: 172.7 cm (67.99\")     Estimated body mass index is 50.49 kg/m² as calculated from the following:    Height as of this encounter: 172.7 cm (67.99\").    Weight as of this encounter: 151 kg (332 lb).           Does the patient have evidence of cognitive impairment? Yes    Lab Results   Component Value Date    TRIG 131 2024    HDL 44 2024     (H) 2024    VLDL 23 2024    HGBA1C 4.90 2024        HEALTH RISK ASSESSMENT    Smoking Status:  Social History     Tobacco Use   Smoking Status Never    Passive exposure: Never   Smokeless Tobacco Never     Alcohol Consumption:  Social History     Substance and Sexual Activity   Alcohol Use Not Currently    Comment: Social, quit [id number]     Fall Risk Screen:    CARLEY Fall Risk Assessment was completed, and patient is at LOW risk for falls.Assessment completed on:2024    Depression Screenin/26/2024     2:23 PM   PHQ-2/PHQ-9 Depression Screening   Little Interest or Pleasure in Doing Things 0-->not at all   Feeling Down, Depressed or Hopeless 0-->not at all   PHQ-9: Brief Depression Severity Measure Score 0       Health Habits and Functional and Cognitive Screenin/15/2023    10:39 AM   Functional & Cognitive Status   Do you have difficulty preparing food and eating? No   Do you have difficulty bathing yourself, getting dressed or grooming yourself? No   Do you have difficulty using the toilet? No   Do you have difficulty moving around from place to place? Yes   Do you have trouble with steps or getting out of a bed or a chair? Yes   Current Diet Unhealthy Diet   Dental Exam Not up to date        Dental Exam Comment    Eye Exam Not up to date        Eye Exam Comment \"probably 10 years\"   Exercise (times per week) 0 times per week   Current Exercises Include No Regular Exercise   Do you need help using the phone?  Yes   Are you deaf or do you have " serious difficulty hearing?  No   Do you need help to go to places out of walking distance? No   Do you need help shopping? No   Do you need help preparing meals?  No   Do you need help with housework?  No   Do you need help with laundry? No   Do you need help taking your medications? Yes   Do you need help managing money? No   Do you ever drive or ride in a car without wearing a seat belt? Yes   Have you felt unusual stress, anger or loneliness in the last month? No   Who do you live with? Spouse   If you need help, do you have trouble finding someone available to you? No   Have you been bothered in the last four weeks by sexual problems? No   Do you have difficulty concentrating, remembering or making decisions? No       Age-appropriate Screening Schedule:  Refer to the list below for future screening recommendations based on patient's age, sex and/or medical conditions. Orders for these recommended tests are listed in the plan section. The patient has been provided with a written plan.    Health Maintenance   Topic Date Due    HEPATITIS C SCREENING  Never done    INFLUENZA VACCINE  08/01/2023    ANNUAL WELLNESS VISIT  02/15/2024    COLORECTAL CANCER SCREENING  11/20/2024 (Originally 1955)    COVID-19 Vaccine (3 - 2023-24 season) 02/25/2025 (Originally 9/1/2023)    RSV Vaccine - Adults (1 - 1-dose 60+ series) 02/26/2025 (Originally 3/16/2014)    ZOSTER VACCINE (1 of 2) 10/30/2025 (Originally 3/16/2004)    TDAP/TD VACCINES (1 - Tdap) 11/06/2025 (Originally [birth year]/16/1974)    LIPID PANEL  02/22/2025    BMI FOLLOWUP  02/26/2025    Pneumococcal Vaccine 65+  Completed                  CMS Preventative Services Quick Reference  Risk Factors Identified During Encounter  None Identified  The above risks/problems have been discussed with the patient.  Pertinent information has been shared with the patient in the After Visit Summary.  An After Visit Summary and PPPS were made available to the patient.    Follow Up:   Next  "Medicare Wellness visit to be scheduled in 1 year.       Additional E&M Note during same encounter follows:  Patient has multiple medical problems which are significant and separately identifiable that require additional work above and beyond the Medicare Wellness Visit.      Chief Complaint  Medicare Wellness-subsequent (Annual MWV)    Subjective        HPI  Zbigniew Andrews is also being seen today for high cholesterol and worsening memory loss.    Last labs did show high LDL of 130.  He has not tried any statins before.  Wife notes that he has been having worsening of memory loss.  He continues to be on donepezil but he tends to forget things more often mostly from recent memory.       Objective   Vital Signs:  /55   Pulse 68   Resp 16   Ht 172.7 cm (67.99\")   Wt (!) 151 kg (332 lb)   SpO2 95%   BMI 50.49 kg/m²     Physical Exam  Vitals and nursing note reviewed.   Constitutional:       Appearance: Normal appearance. He is obese.   HENT:      Head: Normocephalic and atraumatic.   Cardiovascular:      Rate and Rhythm: Normal rate and regular rhythm.      Pulses: Normal pulses.      Heart sounds: Normal heart sounds.   Pulmonary:      Effort: Pulmonary effort is normal. No respiratory distress.      Breath sounds: Normal breath sounds. No wheezing, rhonchi or rales.   Abdominal:      General: Abdomen is flat. Bowel sounds are normal.      Palpations: Abdomen is soft.      Tenderness: There is no abdominal tenderness. There is no guarding.   Musculoskeletal:      Cervical back: Neck supple.   Skin:     General: Skin is warm.      Capillary Refill: Capillary refill takes less than 2 seconds.   Neurological:      General: No focal deficit present.      Mental Status: He is alert. Mental status is at baseline.   Psychiatric:         Mood and Affect: Mood normal.         Behavior: Behavior normal.                         Assessment and Plan   Diagnoses and all orders for this visit:    1. Other " hyperlipidemia (Primary)  Assessment & Plan:    start atorvastatin 20 mg daily, may take fish oil daily  The 10-year ASCVD risk score (Ben PRINCE, et al., 2019) is: 16.6%    Values used to calculate the score:      Age: 68 years      Sex: Male      Is Non- : No      Diabetic: No      Tobacco smoker: No      Systolic Blood Pressure: 117 mmHg      Is BP treated: Yes      HDL Cholesterol: 44 mg/dL      Total Cholesterol: 174 mg/dL      Orders:  -     atorvastatin (LIPITOR) 20 MG tablet; Take 1 tablet by mouth Daily.  Dispense: 90 tablet; Refill: 1  -     Lipid Panel    2. Primary hypertension  Assessment & Plan:   on amlodipine, losartan  Low blood pressure today however he is not checking at home.  Advised ambulatory blood pressure measurement before changing any medications at this point.  Patient and wife agreed and showed complete understanding    Orders:  -     CBC & Differential  -     Comprehensive Metabolic Panel  -     TSH Rfx On Abnormal To Free T4    3. Benign prostatic hyperplasia with urinary obstruction  Assessment & Plan:  Stable on current medication and dosage. Will continue current management. Refill medication as necessary.  Tamsulosin daily      4. Memory loss  Assessment & Plan:  Worsening, will refer to neurology  Continue donepezil for now    Orders:  -     Ambulatory Referral to Neurology    5. Vitamin D deficiency  Assessment & Plan:  Stable on vitamin D daily      6. Anxiety  Assessment & Plan:  Stable on current medication and dosage. Will continue current management. Refill medication as necessary.  Sertraline 25 daily      7. Current mild episode of major depressive disorder without prior episode  Assessment & Plan:  Stable on current medication and dosage. Will continue current management. Refill medication as necessary.  Sertraline 25 daily      8. Idiopathic chronic gout of left foot without tophus  Assessment & Plan:  Asymptomatic, on as needed NSAIDs during  flareups, no recent flare      9. Obstructive sleep apnea syndrome  Assessment & Plan:  Continue BiPAP every night      10. Mobitz type 1 second degree AV block  Assessment & Plan:  Stable, asymptomatic at this time, follows with cardiology      11. Morbid obesity with BMI of 50.0-59.9, adult  Assessment & Plan:  Patient's (Body mass index is 50.49 kg/m².) indicates that they are morbidly/severely obese (BMI > 40 or > 35 with obesity - related health condition) with health conditions that include obstructive sleep apnea, hypertension, dyslipidemias, and GERD . Weight is unchanged. BMI  is above average; BMI management plan is completed. We discussed portion control and increasing exercise.       12. Hyperglycemia  Assessment & Plan:  Stable, not on medications, diet controlled    Orders:  -     Hemoglobin A1c    13. Encounter for hepatitis C screening test for low risk patient  -     Hepatitis C RNA, Quantitative, PCR (graph)    14. Prostate cancer screening  -     PSA Screen             Follow Up   Return in about 6 months (around 8/26/2024) for 6 month follow up.  Patient was given instructions and counseling regarding his condition or for health maintenance advice. Please see specific information pulled into the AVS if appropriate.

## 2024-02-26 NOTE — ASSESSMENT & PLAN NOTE
Patient's (Body mass index is 50.49 kg/m².) indicates that they are morbidly/severely obese (BMI > 40 or > 35 with obesity - related health condition) with health conditions that include obstructive sleep apnea, hypertension, dyslipidemias, and GERD . Weight is unchanged. BMI  is above average; BMI management plan is completed. We discussed portion control and increasing exercise.

## 2024-02-26 NOTE — ASSESSMENT & PLAN NOTE
Stable on current medication and dosage. Will continue current management. Refill medication as necessary.  Tamsulosin daily

## 2024-02-26 NOTE — ASSESSMENT & PLAN NOTE
start atorvastatin 20 mg daily, may take fish oil daily  The 10-year ASCVD risk score (Ben PRINCE, et al., 2019) is: 16.6%    Values used to calculate the score:      Age: 68 years      Sex: Male      Is Non- : No      Diabetic: No      Tobacco smoker: No      Systolic Blood Pressure: 117 mmHg      Is BP treated: Yes      HDL Cholesterol: 44 mg/dL      Total Cholesterol: 174 mg/dL

## 2024-02-26 NOTE — PROGRESS NOTES
The ABCs of the Annual Wellness Visit  Subsequent Medicare Wellness Visit    Subjective    {Wrapup  Review (Popup)  Advance Care Planning  Labs  CC  Problem List  Visit Diagnosis  Medications  Result Review  Imaging  [facility]  BestPractice  [facility]  SnapShot  Encounters  Notes  Media  Procedures :23}  Zbigniew Andrews is a 68 y.o. male who presents for a Subsequent Medicare Wellness Visit.     HTN: on amlodipine, losartan, does not check Bp at home, has lost weight  Dementia: on donepezil (started by MD in [place] 2010), still has some memory loss, fhx alzheimer's. Wife worried about memory, still progressing  Anxiety & depression: on sertraline 25mg daily  BPH: on tamsulosin, weak stream, intermittent urination  MARILY: on bipap at home nightly   Obesity: on Victoza follows with Dr hernandez, bariatric doctor   BLE edema: on as needed bumex  CKD last creatinine 1.32  HLD not on medications, ascvd score >10%  The 10-year ASCVD risk score (Ben PRINCE, et al., 2019) is: 16.6%    Values used to calculate the score:      Age: 68 years      Sex: Male      Is Non- : No      Diabetic: No      Tobacco smoker: No      Systolic Blood Pressure: 117 mmHg      Is BP treated: Yes      HDL Cholesterol: 44 mg/dL      Total Cholesterol: 174 mg/dL    Sees pain clinic: on gabapentin, tramadol 50mg for sleep and methocarbamol due to hx of several fractures and concussion (2021)  Follows with dermatology, hx of skin cancer  Hx of lipomas, general surgeon in [place] plans to remove them     Colonoscopy: 2020, Dr. Hernandez (surgeon in [place]) normal  Nonsmoker    Review of Systems   All other systems reviewed and are negative.       The following portions of the patient's history were reviewed and   updated as appropriate: allergies, current medications, past family history, past medical history, past social history, past surgical history, and problem list.    Compared to one year ago,  the patient feels his physical   health is better.    Compared to one year ago, the patient feels his mental   health is better.    Recent Hospitalizations:  He was not admitted to the hospital during the last year.       Current Medical Providers:  Patient Care Team:  Mackenzie Dimas MD as PCP - General (Family Medicine)  Cote, CAROL Delcid as Referring Physician (Family Medicine)    Outpatient Medications Prior to Visit   Medication Sig Dispense Refill    acetaminophen (TYLENOL) 500 MG tablet Take 2 tablets by mouth Every 6 (Six) Hours As Needed for Mild Pain.      amLODIPine (NORVASC) 5 MG tablet Take 1 tablet by mouth Daily. 90 tablet 3    Ascorbic Acid (VITAMIN C PO) Take  by mouth Daily.      B Complex Vitamins (vitamin b complex) capsule capsule Take 1 capsule by mouth Daily.      bumetanide (BUMEX) 1 MG tablet Take 1 tablet by mouth As Needed.      Cholecalciferol 50 MCG (2000 UT) tablet Take 2 tablets by mouth Daily.      Cinnamon 500 MG capsule Take 1 capsule by mouth Daily.      donepezil (ARICEPT) 10 MG tablet Take 1 tablet by mouth Daily. 90 tablet 1    fluticasone (FLONASE) 50 MCG/ACT nasal spray 2 sprays into the nostril(s) as directed by provider Daily.      gabapentin (NEURONTIN) 600 MG tablet Take 1 tablet by mouth 2 (Two) Times a Day.      glucosamine-chondroitin 500-400 MG capsule capsule Take 1 capsule by mouth 2 (Two) Times a Day With Meals.      guaiFENesin (Mucinex) 600 MG 12 hr tablet Take 400 mg by mouth Daily As Needed.      levocetirizine (XYZAL) 5 MG tablet TAKE 1 TABLET BY MOUTH EVERY EVENING 90 tablet 1    Liraglutide (VICTOZA) 18 MG/3ML solution pen-injector injection Inject 1.8 mg under the skin into the appropriate area as directed Daily.      losartan (COZAAR) 50 MG tablet TAKE 1 TABLET BY MOUTH EVERY DAY 90 tablet 1    methocarbamol (ROBAXIN) 500 MG tablet Take 2 tablets by mouth 3 (Three) Times a Day.      Misc Natural Products (BLACK CHERRY CONCENTRATE PO) Take  by mouth  2 (two) times a day.      Multiple Vitamins-Minerals (ZINC PO) Take  by mouth.      multivitamin (THERAGRAN) tablet tablet Daily.      niacin (NIASPAN) 500 MG CR tablet Take 1 tablet by mouth Every Night.      NON FORMULARY       potassium chloride 10 MEQ CR tablet Take 1 tablet by mouth As Needed. With bumex      sertraline (ZOLOFT) 25 MG tablet TAKE 1 TABLET BY MOUTH EVERY DAY 90 tablet 3    tamsulosin (FLOMAX) 0.4 MG capsule 24 hr capsule TAKE 1 CAPSULE BY MOUTH EVERY DAY 90 capsule 3    topiramate (TOPAMAX) 25 MG tablet Take 1 tablet by mouth Daily.      traMADol (ULTRAM) 50 MG tablet Take 0.5 tablets by mouth Every 12 (Twelve) Hours As Needed for Moderate Pain. (Patient taking differently: Take 0.5 tablets by mouth Every 8 (Eight) Hours As Needed for Moderate Pain.) 3 tablet 0    Turmeric (QC TUMERIC COMPLEX PO) Take  by mouth Daily.      cetirizine (zyrTEC) 10 MG tablet Take 1 tablet by mouth Daily. (Patient not taking: Reported on 2/26/2024)       No facility-administered medications prior to visit.       Opioid medication/s are on active medication list.  and I have evaluated his active treatment plan and pain score trends (see table).  There were no vitals filed for this visit.  I have reviewed the chart for potential of high risk medication and harmful drug interactions in the elderly.          Aspirin is not on active medication list.  Aspirin use is not indicated based on review of current medical condition/s. Risk of harm outweighs potential benefits.  .    Patient Active Problem List   Diagnosis    Morbid obesity with BMI of 50.0-59.9, adult    Primary hypertension    Vitamin D deficiency    Memory loss    Allergy    Depression    Anxiety    Benign prostatic hyperplasia with lower urinary tract symptoms    History of nephrolithiasis    Neck pain    Fibromyalgia    History of repair of left rotator cuff    Idiopathic chronic gout of left foot without tophus    DARNELL (dyspnea on exertion)    History of  "bariatric surgery    Obstructive sleep apnea syndrome    Mobitz type 1 second degree AV block    Arrhythmia    Other hyperlipidemia    Hyperglycemia     Advance Care Planning  Advance Directive is on file.  ACP discussion was held with the patient during this visit. Patient has an advance directive in EMR which is still valid.      Objective    Vitals:    24 1426   BP: 117/55   Pulse: 68   Resp: 16   SpO2: 95%   Weight: (!) 151 kg (332 lb)   Height: 172.7 cm (67.99\")     Estimated body mass index is 50.49 kg/m² as calculated from the following:    Height as of this encounter: 172.7 cm (67.99\").    Weight as of this encounter: 151 kg (332 lb).    Physical Exam           Does the patient have evidence of cognitive impairment?   Yes: Referral to neurology ordered.    Lab Results   Component Value Date    TRIG 131 2024    HDL 44 2024     (H) 2024    VLDL 23 2024    HGBA1C 4.90 2024          HEALTH RISK ASSESSMENT    Smoking Status:  Social History     Tobacco Use   Smoking Status Never    Passive exposure: Never   Smokeless Tobacco Never     Alcohol Consumption:  Social History     Substance and Sexual Activity   Alcohol Use Not Currently    Comment: Social, quit [id number]     Fall Risk Screen:    CARLEY Fall Risk Assessment was completed, and patient is at LOW risk for falls.Assessment completed on:2024    Depression Screenin/26/2024     2:23 PM   PHQ-2/PHQ-9 Depression Screening   Little Interest or Pleasure in Doing Things 0-->not at all   Feeling Down, Depressed or Hopeless 0-->not at all   PHQ-9: Brief Depression Severity Measure Score 0       Health Habits and Functional and Cognitive Screenin/15/2023    10:39 AM   Functional & Cognitive Status   Do you have difficulty preparing food and eating? No   Do you have difficulty bathing yourself, getting dressed or grooming yourself? No   Do you have difficulty using the toilet? No   Do you have difficulty " "moving around from place to place? Yes   Do you have trouble with steps or getting out of a bed or a chair? Yes   Current Diet Unhealthy Diet   Dental Exam Not up to date        Dental Exam Comment 2020   Eye Exam Not up to date        Eye Exam Comment \"probably 10 years\"   Exercise (times per week) 0 times per week   Current Exercises Include No Regular Exercise   Do you need help using the phone?  Yes   Are you deaf or do you have serious difficulty hearing?  No   Do you need help to go to places out of walking distance? No   Do you need help shopping? No   Do you need help preparing meals?  No   Do you need help with housework?  No   Do you need help with laundry? No   Do you need help taking your medications? Yes   Do you need help managing money? No   Do you ever drive or ride in a car without wearing a seat belt? Yes   Have you felt unusual stress, anger or loneliness in the last month? No   Who do you live with? Spouse   If you need help, do you have trouble finding someone available to you? No   Have you been bothered in the last four weeks by sexual problems? No   Do you have difficulty concentrating, remembering or making decisions? No       Age-appropriate Screening Schedule:  Refer to the list below for future screening recommendations based on patient's age, sex and/or medical conditions. Orders for these recommended tests are listed in the plan section. The patient has been provided with a written plan.    Health Maintenance   Topic Date Due    HEPATITIS C SCREENING  Never done    INFLUENZA VACCINE  08/01/2023    ANNUAL WELLNESS VISIT  02/15/2024    COLORECTAL CANCER SCREENING  11/20/2024 (Originally 1955)    COVID-19 Vaccine (3 - 2023-24 season) 02/25/2025 (Originally 9/1/2023)    RSV Vaccine - Adults (1 - 1-dose 60+ series) 02/26/2025 (Originally 3/16/2014)    ZOSTER VACCINE (1 of 2) 10/30/2025 (Originally 3/16/2004)    TDAP/TD VACCINES (1 - Tdap) 11/06/2025 (Originally [birth year]/16/1973)    BMI " FOLLOWUP  11/20/2024    LIPID PANEL  02/22/2025    Pneumococcal Vaccine 65+  Completed                CMS Preventative Services Quick Reference  Risk Factors Identified During Encounter:    {Medicare Wellness Risk Factors:78269}    The above risks/problems have been discussed with the patient.  Pertinent information has been shared with the patient in the After Visit Summary.    Diagnoses and all orders for this visit:    1. Other hyperlipidemia (Primary)  -     atorvastatin (LIPITOR) 20 MG tablet; Take 1 tablet by mouth Daily.  Dispense: 90 tablet; Refill: 1  -     Lipid Panel    2. Primary hypertension  -     CBC & Differential  -     Comprehensive Metabolic Panel  -     TSH Rfx On Abnormal To Free T4    3. Benign prostatic hyperplasia with urinary obstruction    4. Memory loss  -     Ambulatory Referral to Neurology    5. Vitamin D deficiency    6. Anxiety    7. Current mild episode of major depressive disorder without prior episode    8. Idiopathic chronic gout of left foot without tophus    9. Obstructive sleep apnea syndrome    10. Mobitz type 1 second degree AV block    11. Morbid obesity with BMI of 50.0-59.9, adult    12. Hyperglycemia  -     Hemoglobin A1c    13. Encounter for hepatitis C screening test for low risk patient  -     Hepatitis C RNA, Quantitative, PCR (graph)    14. Prostate cancer screening  -     PSA Screen        Follow Up:   Next Medicare Wellness visit to be scheduled in 1 year.      An After Visit Summary and PPPS were made available to the patient.

## 2024-02-26 NOTE — ASSESSMENT & PLAN NOTE
on amlodipine, losartan  Low blood pressure today however he is not checking at home.  Advised ambulatory blood pressure measurement before changing any medications at this point.  Patient and wife agreed and showed complete understanding

## 2024-02-29 ENCOUNTER — TELEPHONE (OUTPATIENT)
Dept: CARDIOLOGY | Facility: CLINIC | Age: 69
End: 2024-02-29
Payer: MEDICARE

## 2024-02-29 NOTE — TELEPHONE ENCOUNTER
Kylie from [facility] called stating that they are needing cardiac clearance for Breast tissue removal. Pt was last seen 2/14/24. Please advise    Fax [phone]

## 2024-03-05 ENCOUNTER — OFFICE VISIT (OUTPATIENT)
Dept: NEUROLOGY | Facility: CLINIC | Age: 69
End: 2024-03-05
Payer: MEDICARE

## 2024-03-05 VITALS
TEMPERATURE: 99.6 F | HEIGHT: 68 IN | RESPIRATION RATE: 14 BRPM | BODY MASS INDEX: 47.74 KG/M2 | HEART RATE: 71 BPM | SYSTOLIC BLOOD PRESSURE: 134 MMHG | OXYGEN SATURATION: 99 % | WEIGHT: 315 LBS | DIASTOLIC BLOOD PRESSURE: 68 MMHG

## 2024-03-05 DIAGNOSIS — R41.3 MEMORY LOSS: ICD-10-CM

## 2024-03-05 DIAGNOSIS — R27.0 ATAXIA: ICD-10-CM

## 2024-03-05 DIAGNOSIS — R41.89 SUBJECTIVE MEMORY COMPLAINTS: Primary | ICD-10-CM

## 2024-03-05 RX ORDER — MEMANTINE HYDROCHLORIDE 5 MG/1
5 TABLET ORAL DAILY
Qty: 30 TABLET | Refills: 2 | Status: SHIPPED | OUTPATIENT
Start: 2024-03-05 | End: 2025-03-05

## 2024-03-05 RX ORDER — DONEPEZIL HYDROCHLORIDE 10 MG/1
10 TABLET, FILM COATED ORAL DAILY
Qty: 90 TABLET | Refills: 1 | Status: SHIPPED | OUTPATIENT
Start: 2024-03-05

## 2024-03-05 NOTE — PROGRESS NOTES
New Patient Office Visit      Patient Name: Zbigniew Andrews  : 1955   MRN: [id number]     Referring Physician: Mackenzie Dimas MD    Chief Complaint:    Chief Complaint   Patient presents with   • Establish Care     Memory; pt has been on Aricept for 10-12 years.        History of Present Illness: Zbigniew Andrews is a 68 y.o. male who is here today to establish care with Neurology.      He has never been seen before in Neurology. He was referred to us from PCP (Wing) for subjective memory impairment. He does not feel he needs to be here today; wife made appt for subjective memory impairment. He has been taking Aricept 10 mg QHS with good tolerance and compliance and has been taking medication for > 12 weeks. Wife feels medication has helped with memory. Wife reports he will get lost when driving to [employer], gets confused about what city he is visiting, struggles to remember kids ages. No one else has noticed his memory impairment. His support system is in [place] and has limited friends in [place].  Does have some dizziness with position changes and at rest. + falls. Working with Cardiology and recent diagnosed with 1 Degree AV Block. No palpations or CP.     Social: Happily  to Mayda Kay for 42 years. Two adult children (Rola and Nikki). Three grandchildren (Ages 11, 8, 7). Retired [employer] Worker; retired > 10 years ago. HS graduate. [employer]. No . No tobacco, recreational drugs or ETOH. Works PT as Hobby Taxidermist.     MARILY managed by Sleep Medicine (Ugo Moya). On CPAP QHS.     Takes Gabapentin for BLE PN from toes to ankles. This is managed by PCP.    [facility] Neuro: Father- AD, Maternal Grandfather- AD, Maternal Uncle- AD    Recent Imaging:     CT Head WO  + Cerebral volume loss     Pertinent Medical History: Allergic Rhinitis, HTN, HL, 1 degree AV Block, Morbid Obesity, S/P Bariatric Surgery, Renal Stones, BPH, Depression, Anxiety, Fibromyalgia, MARILY  (treated with CPAP), OA, Gout, IBS, BCC, Ankylosing Spondylosis     Subjective      Review of Systems:   Review of Systems   Constitutional: Negative.  Negative for fatigue.   HENT: Negative.     Eyes: Negative.    Respiratory: Negative.     Cardiovascular: Negative.    Gastrointestinal: Negative.    Endocrine: Negative.    Genitourinary: Negative.    Musculoskeletal: Negative.    Skin: Negative.    Allergic/Immunologic: Negative.    Neurological:  Positive for dizziness, numbness and memory problem. Negative for tremors, seizures, syncope, facial asymmetry, speech difficulty, weakness, light-headedness, headache and confusion.   Hematological: Negative.    Psychiatric/Behavioral: Negative.  Negative for self-injury, sleep disturbance, suicidal ideas, depressed mood and stress. The patient is not nervous/anxious.    All other systems reviewed and are negative.      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        Past Surgical History:   Past Surgical History:   Procedure Laterality Date   • COLONOSCOPY     • ENDOSCOPY     • GASTRIC SLEEVE LAPAROSCOPIC     • HERNIA REPAIR     • PYLOROMYOTOMY  1955   • SHOULDER ARTHROSCOPY W/ ROTATOR CUFF REPAIR Right 10/21/2021    Procedure: Shoulder diagnostic arthroscopy, synovectomy, labral debridement, biceps tenolysis subacromial decompression, bursectomy, distal clavicle excision and mini open rotator cuff repair.;  Surgeon: Venancio Espinosa MD;  Location: [facility];  Service: Orthopedics;  Laterality: Right;   • SKIN BIOPSY     • UMBILICAL HERNIA REPAIR  2000   • VENA CAVA FILTER INSERTION         Family History:   Family History    Problem Relation Age of Onset   • Hypertension Mother    • Cancer Mother    • Arthritis Mother    • Asthma Mother    • Ovarian cancer Mother    • Hypertension Father    • Heart disease Father    • Arthritis Father    • Kidney disease Father    • Alzheimer's disease Father    • Kidney failure Father    • Arthritis Sister    • Arthritis Brother    • Hypertension Brother    • Hepatitis Brother    • Atrial fibrillation Brother    • Alzheimer's disease Maternal Grandfather        Social History:   Social History     Socioeconomic History   • Marital status:    Tobacco Use   • Smoking status: Never     Passive exposure: Never   • Smokeless tobacco: Never   Vaping Use   • Vaping status: Never Used   Substance and Sexual Activity   • Alcohol use: Not Currently     Comment: Social, quit [id number]   • Drug use: Never   • Sexual activity: Defer       Medications:     Current Outpatient Medications:   •  acetaminophen (TYLENOL) 500 MG tablet, Take 2 tablets by mouth Every 6 (Six) Hours As Needed for Mild Pain., Disp: , Rfl:   •  amLODIPine (NORVASC) 5 MG tablet, Take 1 tablet by mouth Daily., Disp: 90 tablet, Rfl: 3  •  Ascorbic Acid (VITAMIN C PO), Take  by mouth Daily., Disp: , Rfl:   •  atorvastatin (LIPITOR) 20 MG tablet, Take 1 tablet by mouth Daily., Disp: 90 tablet, Rfl: 1  •  B Complex Vitamins (vitamin b complex) capsule capsule, Take 1 capsule by mouth Daily., Disp: , Rfl:   •  bumetanide (BUMEX) 1 MG tablet, Take 1 tablet by mouth As Needed., Disp: , Rfl:   •  Cholecalciferol 50 MCG (2000 UT) tablet, Take 2 tablets by mouth Daily., Disp: , Rfl:   •  Cinnamon 500 MG capsule, Take 1 capsule by mouth Daily., Disp: , Rfl:   •  donepezil (ARICEPT) 10 MG tablet, Take 1 tablet by mouth Daily., Disp: 90 tablet, Rfl: 1  •  fluticasone (FLONASE) 50 MCG/ACT nasal spray, 2 sprays into the nostril(s) as directed by provider Daily., Disp: , Rfl:   •  gabapentin (NEURONTIN) 600 MG tablet, Take 1 tablet by mouth 2 (Two) Times  a Day., Disp: , Rfl:   •  glucosamine-chondroitin 500-400 MG capsule capsule, Take 1 capsule by mouth 2 (Two) Times a Day With Meals., Disp: , Rfl:   •  guaiFENesin (Mucinex) 600 MG 12 hr tablet, Take 400 mg by mouth Daily As Needed., Disp: , Rfl:   •  levocetirizine (XYZAL) 5 MG tablet, TAKE 1 TABLET BY MOUTH EVERY EVENING, Disp: 90 tablet, Rfl: 1  •  Liraglutide (VICTOZA) 18 MG/3ML solution pen-injector injection, Inject 1.8 mg under the skin into the appropriate area as directed Daily., Disp: , Rfl:   •  losartan (COZAAR) 50 MG tablet, TAKE 1 TABLET BY MOUTH EVERY DAY, Disp: 90 tablet, Rfl: 1  •  methocarbamol (ROBAXIN) 500 MG tablet, Take 2 tablets by mouth 3 (Three) Times a Day., Disp: , Rfl:   •  Misc Natural Products (BLACK CHERRY CONCENTRATE PO), Take  by mouth 2 (two) times a day., Disp: , Rfl:   •  Multiple Vitamins-Minerals (ZINC PO), Take  by mouth., Disp: , Rfl:   •  multivitamin (THERAGRAN) tablet tablet, Daily., Disp: , Rfl:   •  niacin (NIASPAN) 500 MG CR tablet, Take 1 tablet by mouth Every Night., Disp: , Rfl:   •  NON FORMULARY, , Disp: , Rfl:   •  potassium chloride 10 MEQ CR tablet, Take 1 tablet by mouth As Needed. With bumex, Disp: , Rfl:   •  sertraline (ZOLOFT) 25 MG tablet, TAKE 1 TABLET BY MOUTH EVERY DAY, Disp: 90 tablet, Rfl: 3  •  tamsulosin (FLOMAX) 0.4 MG capsule 24 hr capsule, TAKE 1 CAPSULE BY MOUTH EVERY DAY, Disp: 90 capsule, Rfl: 3  •  topiramate (TOPAMAX) 25 MG tablet, Take 1 tablet by mouth Daily., Disp: , Rfl:   •  traMADol (ULTRAM) 50 MG tablet, Take 0.5 tablets by mouth Every 12 (Twelve) Hours As Needed for Moderate Pain. (Patient taking differently: Take 0.5 tablets by mouth Every 8 (Eight) Hours As Needed for Moderate Pain.), Disp: 3 tablet, Rfl: 0  •  Turmeric (QC TUMERIC COMPLEX PO), Take  by mouth Daily., Disp: , Rfl:   •  memantine (NAMENDA) 5 MG tablet, Take 1 tablet by mouth Daily., Disp: 30 tablet, Rfl: 2    Allergies:   No Known Allergies    Objective     Physical  "Exam:  Vital Signs:   Vitals:    03/05/24 1129   BP: 134/68   BP Location: Right arm   Patient Position: Sitting   Cuff Size: Adult   Pulse: 71   Resp: 14   Temp: 99.6 °F (37.6 °C)   TempSrc: Infrared   SpO2: 99%   Weight: (!) 152 kg (335 lb 6.4 oz)   Height: 172.7 cm (67.99\")   PainSc:   4   PainLoc: Comment: back     Body mass index is 51.01 kg/m².     Physical Exam  Vitals and nursing note reviewed.   Constitutional:       General: He is not in acute distress.     Appearance: Normal appearance. He is normal weight.   HENT:      Head: Normocephalic.      Nose: Nose normal.      Mouth/Throat:      Mouth: Mucous membranes are moist.      Pharynx: Oropharynx is clear.   Eyes:      Extraocular Movements: Extraocular movements intact.      Conjunctiva/sclera: Conjunctivae normal.      Pupils: Pupils are equal, round, and reactive to light.   Musculoskeletal:      Cervical back: Normal range of motion and neck supple. No rigidity.   Skin:     General: Skin is warm and dry.      Capillary Refill: Capillary refill takes less than 2 seconds.   Neurological:      Mental Status: He is alert and oriented to person, place, and time.      Cranial Nerves: Cranial nerves 2-12 are intact.      Sensory: Sensory deficit present.      Coordination: Coordination abnormal. Finger-Nose-Finger Test and Romberg Test normal.      Deep Tendon Reflexes:      Reflex Scores:       Tricep reflexes are 2+ on the right side and 2+ on the left side.       Bicep reflexes are 2+ on the right side and 2+ on the left side.       Patellar reflexes are 2+ on the right side and 2+ on the left side.  Psychiatric:         Mood and Affect: Mood normal.         Speech: Speech normal.         Behavior: Behavior normal.         Thought Content: Thought content normal.         Judgment: Judgment normal.         Neurologic Exam     Mental Status   Oriented to person, place, and time.   Oriented to country, city, area, street and number.   Oriented to year, " month, date, day and season.   Registration: recalls 3 of 3 objects. Recall at 5 minutes: recalls 3 of 3 objects. Follows 3 step commands.   Attention: normal. Concentration: normal.   Speech: speech is normal   Level of consciousness: alert  Knowledge: good. Able to perform simple calculations.   Able to name object. Able to read. Able to repeat. Able to write. Normal comprehension.     Cranial Nerves   Cranial nerves II through XII intact.     CN III, IV, VI   Pupils are equal, round, and reactive to light.    Motor Exam   Muscle bulk: normal  Overall muscle tone: normal  Right arm tone: normal  Left arm tone: normal  Right arm pronator drift: absent  Left arm pronator drift: absent  Right leg tone: normal  Left leg tone: normal    Strength   Right biceps: 5/5  Left biceps: 5/5  Right triceps: 5/5  Left triceps: 5/5  Right quadriceps: 5/5  Left quadriceps: 5/5  Right hamstrin/5  Left hamstrin/5    Sensory Exam   Light touch normal.     Gait, Coordination, and Reflexes     Gait  Gait: shuffling    Coordination   Romberg: negative  Finger to nose coordination: normal    Tremor   Resting tremor: absent  Intention tremor: absent  Action tremor: absent    Reflexes   Right biceps: 2+  Left biceps: 2+  Right triceps: 2+  Left triceps: 2+  Right patellar: 2+  Left patellar: 2+  Right Enciso: absent  Left Enciso: absentHe is able to move from sitting to standing position in x 2 attempts. Shuffling gait with adequate arm swing. He can ambulate 25 feet in 5 seconds       PHQ-9 Total Score: 4      Assessment / Plan      Assessment/Plan:   Diagnoses and all orders for this visit:    1. Subjective memory complaints (Primary)  -     MRI Brain With & Without Contrast; Future  -     US Carotid Bilateral; Future    2. Memory loss  -     memantine (NAMENDA) 5 MG tablet; Take 1 tablet by mouth Daily.  Dispense: 30 tablet; Refill: 2  -     donepezil (ARICEPT) 10 MG tablet; Take 1 tablet by mouth Daily.  Dispense: 90 tablet;  Refill: 1    3. Ataxia  -     US Carotid Bilateral; Future         Follow Up:   Return in about 3 months (around 6/5/2024), or if symptoms worsen or fail to improve.    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 Neurology and Sleep Medicine

## 2024-04-29 ENCOUNTER — HOSPITAL ENCOUNTER (OUTPATIENT)
Dept: ULTRASOUND IMAGING | Facility: HOSPITAL | Age: 69
Discharge: HOME | End: 2024-04-29
Payer: COMMERCIAL

## 2024-04-29 ENCOUNTER — HOSPITAL ENCOUNTER (OUTPATIENT)
Dept: MRI IMAGING | Facility: HOSPITAL | Age: 69
Discharge: HOME | End: 2024-04-29
Payer: COMMERCIAL

## 2024-04-29 DIAGNOSIS — R27.0 ATAXIA: ICD-10-CM

## 2024-04-29 DIAGNOSIS — R41.89 SUBJECTIVE MEMORY COMPLAINTS: ICD-10-CM

## 2024-04-29 PROCEDURE — 70553 MRI BRAIN STEM W/O & W/DYE: CPT

## 2024-04-29 PROCEDURE — 0 GADOBENATE DIMEGLUMINE 529 MG/ML SOLUTION: Performed by: NURSE PRACTITIONER

## 2024-04-29 PROCEDURE — A9577 INJ MULTIHANCE: HCPCS | Performed by: NURSE PRACTITIONER

## 2024-04-29 PROCEDURE — 93880 EXTRACRANIAL BILAT STUDY: CPT

## 2024-04-29 RX ADMIN — GADOBENATE DIMEGLUMINE 30 ML: 529 INJECTION, SOLUTION INTRAVENOUS at 07:39

## 2024-05-01 RX ORDER — LOSARTAN POTASSIUM 50 MG/1
50 TABLET ORAL DAILY
Qty: 90 TABLET | Refills: 1 | Status: SHIPPED | OUTPATIENT
Start: 2024-05-01

## 2024-05-30 ENCOUNTER — OFFICE VISIT (OUTPATIENT)
Dept: NEUROLOGY | Facility: CLINIC | Age: 69
End: 2024-05-30
Payer: MEDICARE

## 2024-05-30 VITALS
TEMPERATURE: 98.6 F | SYSTOLIC BLOOD PRESSURE: 122 MMHG | DIASTOLIC BLOOD PRESSURE: 66 MMHG | HEART RATE: 69 BPM | BODY MASS INDEX: 47.74 KG/M2 | RESPIRATION RATE: 14 BRPM | OXYGEN SATURATION: 98 % | HEIGHT: 68 IN | WEIGHT: 315 LBS

## 2024-05-30 DIAGNOSIS — R41.89 SUBJECTIVE MEMORY COMPLAINTS: ICD-10-CM

## 2024-05-30 DIAGNOSIS — R41.3 MEMORY LOSS: Primary | ICD-10-CM

## 2024-05-30 RX ORDER — MEMANTINE HYDROCHLORIDE 5 MG/1
5 TABLET ORAL DAILY
Qty: 90 TABLET | Refills: 1 | Status: SHIPPED | OUTPATIENT
Start: 2024-05-30

## 2024-05-30 RX ORDER — DONEPEZIL HYDROCHLORIDE 10 MG/1
10 TABLET, FILM COATED ORAL DAILY
Qty: 90 TABLET | Refills: 1 | Status: SHIPPED | OUTPATIENT
Start: 2024-05-30

## 2024-05-30 NOTE — PROGRESS NOTES
Follow Up Office Visit      Patient Name: Zbigniew Andrews  : 1955   MRN: [id number]     Chief Complaint:    Chief Complaint   Patient presents with    Follow-up     Memory        History of Present Illness: Zbigniew Andrews is a 69 y.o. male who is here today to follow up with Neurology for subjective memory impairment and memory loss. He was last seen in clinic  ([facility]).  He was started on Namenda 5 mg QHS and reports good tolerance and compliance with medication.  He was started on Aricept 10 mg Daily by PCP and reports good tolerance and compliance with medication.  He was referred to MRI of brain and for carotid ultrasound and would like to discuss the results today.    Bipap therapy since  and this is managed by Sleep Specialist (Nita). He reports 100% compliance and makes him feel better!     Taken from previous encounter:    He has never been seen before in Neurology. He does not feel he needs to be here today; wife made appt for subjective memory impairment. He has been taking Aricept 10 mg QHS with good tolerance and compliance and has been taking medication for > 12 weeks. Wife feels medication has helped with memory. Wife reports he will get lost when driving to [employer], gets confused about what city he is visiting, struggles to remember kids ages. No one else has noticed his memory impairment. His support system is in [place] and has limited friends in [place].     Recent Imaging:     MRI Brain W/WO   +The ventricles are moderately dilated indicating atrophy appropriate for age. There is minimal small vessel ischemic disease.   CDUS  + Mild plaque within the bilateral carotid bulbs with less than 50% stenosis.   CT Head WO  + Cerebral volume loss      Pertinent Medical History: Allergic Rhinitis, HTN, HL, 1 degree AV Block, Morbid Obesity, S/P Bariatric Surgery, Renal Stones, BPH, Depression, Anxiety, Fibromyalgia, MARILY (treated with CPAP), OA, Gout, IBS, BCC,  Ankylosing Spondylosis   Subjective      Review of Systems:   Review of Systems   Constitutional: Negative.    HENT: Negative.     Eyes: Negative.    Respiratory: Negative.     Cardiovascular: Negative.    Gastrointestinal: Negative.    Endocrine: Negative.    Genitourinary: Negative.    Musculoskeletal: Negative.    Skin: Negative.    Allergic/Immunologic: Negative.    Neurological:  Positive for memory problem.   Hematological: Negative.    Psychiatric/Behavioral:  Positive for sleep disturbance.    All other systems reviewed and are negative.      I have reviewed and the following portions of the patient's history were updated as appropriate: past family history, past medical history, past social history, past surgical history and problem list.    Medications:     Current Outpatient Medications:     acetaminophen (TYLENOL) 500 MG tablet, Take 2 tablets by mouth Every 6 (Six) Hours As Needed for Mild Pain., Disp: , Rfl:     amLODIPine (NORVASC) 5 MG tablet, Take 1 tablet by mouth Daily., Disp: 90 tablet, Rfl: 3    Ascorbic Acid (VITAMIN C PO), Take  by mouth Daily., Disp: , Rfl:     atorvastatin (LIPITOR) 20 MG tablet, Take 1 tablet by mouth Daily., Disp: 90 tablet, Rfl: 1    B Complex Vitamins (vitamin b complex) capsule capsule, Take 1 capsule by mouth Daily., Disp: , Rfl:     bumetanide (BUMEX) 1 MG tablet, Take 1 tablet by mouth As Needed., Disp: , Rfl:     Cholecalciferol 50 MCG (2000 UT) tablet, Take 2 tablets by mouth Daily., Disp: , Rfl:     Cinnamon 500 MG capsule, Take 1 capsule by mouth Daily., Disp: , Rfl:     donepezil (ARICEPT) 10 MG tablet, Take 1 tablet by mouth Daily., Disp: 90 tablet, Rfl: 1    fluticasone (FLONASE) 50 MCG/ACT nasal spray, 2 sprays into the nostril(s) as directed by provider Daily., Disp: , Rfl:     gabapentin (NEURONTIN) 600 MG tablet, Take 1 tablet by mouth 2 (Two) Times a Day., Disp: , Rfl:     glucosamine-chondroitin 500-400 MG capsule capsule, Take 1 capsule by mouth 2 (Two)  Times a Day With Meals., Disp: , Rfl:     guaiFENesin (Mucinex) 600 MG 12 hr tablet, Take 400 mg by mouth Daily As Needed., Disp: , Rfl:     levocetirizine (XYZAL) 5 MG tablet, TAKE 1 TABLET BY MOUTH EVERY EVENING, Disp: 90 tablet, Rfl: 1    Liraglutide (VICTOZA) 18 MG/3ML solution pen-injector injection, Inject 1.8 mg under the skin into the appropriate area as directed Daily., Disp: , Rfl:     losartan (COZAAR) 50 MG tablet, TAKE 1 TABLET BY MOUTH EVERY DAY, Disp: 90 tablet, Rfl: 1    memantine (NAMENDA) 5 MG tablet, Take 1 tablet by mouth Daily., Disp: 90 tablet, Rfl: 1    methocarbamol (ROBAXIN) 500 MG tablet, Take 2 tablets by mouth 3 (Three) Times a Day., Disp: , Rfl:     Misc Natural Products (BLACK CHERRY CONCENTRATE PO), Take  by mouth 2 (two) times a day., Disp: , Rfl:     Multiple Vitamins-Minerals (ZINC PO), Take  by mouth., Disp: , Rfl:     multivitamin (THERAGRAN) tablet tablet, Daily., Disp: , Rfl:     niacin (NIASPAN) 500 MG CR tablet, Take 1 tablet by mouth Every Night., Disp: , Rfl:     NON FORMULARY, , Disp: , Rfl:     potassium chloride 10 MEQ CR tablet, Take 1 tablet by mouth As Needed. With bumex, Disp: , Rfl:     sertraline (ZOLOFT) 25 MG tablet, TAKE 1 TABLET BY MOUTH EVERY DAY, Disp: 90 tablet, Rfl: 3    tamsulosin (FLOMAX) 0.4 MG capsule 24 hr capsule, TAKE 1 CAPSULE BY MOUTH EVERY DAY, Disp: 90 capsule, Rfl: 3    topiramate (TOPAMAX) 25 MG tablet, Take 1 tablet by mouth Daily., Disp: , Rfl:     traMADol (ULTRAM) 50 MG tablet, Take 0.5 tablets by mouth Every 12 (Twelve) Hours As Needed for Moderate Pain. (Patient taking differently: Take 0.5 tablets by mouth Every 8 (Eight) Hours As Needed for Moderate Pain.), Disp: 3 tablet, Rfl: 0    Turmeric (QC TUMERIC COMPLEX PO), Take  by mouth Daily., Disp: , Rfl:     Allergies:   No Known Allergies    Objective     Physical Exam:  Vital Signs:   Vitals:    05/30/24 1030   BP: 122/66   BP Location: Right arm   Patient Position: Sitting   Cuff Size:  "Adult   Pulse: 69   Resp: 14   Temp: 98.6 °F (37 °C)   TempSrc: Infrared   SpO2: 98%   Weight: (!) 150 kg (329 lb 12.8 oz)   Height: 172.7 cm (67.99\")   PainSc:   4   PainLoc: Neck     Body mass index is 50.16 kg/m².    Physical Exam  Vitals and nursing note reviewed.   Constitutional:       General: He is not in acute distress.     Appearance: Normal appearance.   HENT:      Head: Normocephalic.      Nose: Nose normal.      Mouth/Throat:      Mouth: Mucous membranes are moist.      Pharynx: Oropharynx is clear.   Eyes:      Extraocular Movements: Extraocular movements intact.      Conjunctiva/sclera: Conjunctivae normal.   Musculoskeletal:      Cervical back: Normal range of motion and neck supple.   Skin:     General: Skin is warm and dry.      Capillary Refill: Capillary refill takes less than 2 seconds.   Neurological:      Mental Status: He is alert and oriented to person, place, and time.      Cranial Nerves: Cranial nerves 2-12 are intact. No cranial nerve deficit.      Motor: No weakness.      Coordination: Coordination normal.      Gait: Gait is intact. Gait abnormal.      Deep Tendon Reflexes: Reflexes normal.      Reflex Scores:       Tricep reflexes are 2+ on the right side and 2+ on the left side.       Bicep reflexes are 2+ on the right side and 2+ on the left side.       Patellar reflexes are 2+ on the right side and 2+ on the left side.  Psychiatric:         Mood and Affect: Mood normal.         Speech: Speech normal.         Behavior: Behavior normal.         Neurologic Exam     Mental Status   Oriented to person, place, and time.   Disoriented to number. Oriented to country, city, area and street.   Oriented to year, month, date, day and season.   Registration: recalls 3 of 3 objects. Recall at 5 minutes: recalls 3 of 3 objects. Follows 3 step commands.   Attention: normal. Concentration: normal.   Speech: speech is normal   Level of consciousness: alert  Knowledge: good. Able to perform simple " calculations.   Able to name object. Able to read. Able to repeat. Able to write. Normal comprehension.   MMSE 29/30     Cranial Nerves   Cranial nerves II through XII intact.     Motor Exam   Muscle bulk: normal  Overall muscle tone: normal  Right arm tone: normal  Left arm tone: normal  Right leg tone: normal  Left leg tone: normal    Strength   Right biceps: 5/5  Left biceps: 5/5  Right triceps: 5/5  Left triceps: 5/5  Right quadriceps: 5/5  Left quadriceps: 5/5  Right hamstrin/5  Left hamstrin/5    Sensory Exam   Light touch normal.     Gait, Coordination, and Reflexes     Gait  Gait: normal and shuffling    Tremor   Resting tremor: absent    Reflexes   Right biceps: 2+  Left biceps: 2+  Right triceps: 2+  Left triceps: 2+  Right patellar: 2+  Left patellar: 2+  Right Enciso: absent  Left Enciso: absent       Assessment / Plan      Assessment/Plan:   Diagnoses and all orders for this visit:    1. Memory loss (Primary)  -     donepezil (ARICEPT) 10 MG tablet; Take 1 tablet by mouth Daily.  Dispense: 90 tablet; Refill: 1  -     memantine (NAMENDA) 5 MG tablet; Take 1 tablet by mouth Daily.  Dispense: 90 tablet; Refill: 1    2. Subjective memory complaints         Follow Up:   Return in about 6 months (around 2024), or if symptoms worsen or fail to improve.    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

## 2024-06-05 RX ORDER — SERTRALINE HYDROCHLORIDE 25 MG/1
25 TABLET, FILM COATED ORAL DAILY
Qty: 90 TABLET | Refills: 1 | Status: SHIPPED | OUTPATIENT
Start: 2024-06-05

## 2024-06-05 RX ORDER — TAMSULOSIN HYDROCHLORIDE 0.4 MG/1
1 CAPSULE ORAL DAILY
Qty: 90 CAPSULE | Refills: 1 | Status: SHIPPED | OUTPATIENT
Start: 2024-06-05

## 2024-06-09 ENCOUNTER — APPOINTMENT (OUTPATIENT)
Dept: GENERAL RADIOLOGY | Facility: HOSPITAL | Age: 69
End: 2024-06-09
Payer: MEDICARE

## 2024-06-09 ENCOUNTER — HOSPITAL ENCOUNTER (EMERGENCY)
Facility: HOSPITAL | Age: 69
Discharge: HOME | End: 2024-06-09
Attending: STUDENT IN AN ORGANIZED HEALTH CARE EDUCATION/TRAINING PROGRAM | Admitting: STUDENT IN AN ORGANIZED HEALTH CARE EDUCATION/TRAINING PROGRAM
Payer: MEDICARE

## 2024-06-09 VITALS
WEIGHT: 315 LBS | HEART RATE: 61 BPM | SYSTOLIC BLOOD PRESSURE: 156 MMHG | HEIGHT: 68 IN | OXYGEN SATURATION: 97 % | BODY MASS INDEX: 47.74 KG/M2 | RESPIRATION RATE: 18 BRPM | TEMPERATURE: 98.1 F | DIASTOLIC BLOOD PRESSURE: 83 MMHG

## 2024-06-09 DIAGNOSIS — M71.021 ABSCESS OF BURSA OF RIGHT ELBOW: Primary | ICD-10-CM

## 2024-06-09 LAB
BASOPHILS # BLD AUTO: 0.05 10*3/MM3 (ref 0–0.2)
BASOPHILS NFR BLD AUTO: 0.6 % (ref 0–1.5)
DEPRECATED RDW RBC AUTO: 45.2 FL (ref 37–54)
EOSINOPHIL # BLD AUTO: 0.22 10*3/MM3 (ref 0–0.4)
EOSINOPHIL NFR BLD AUTO: 2.8 % (ref 0.3–6.2)
ERYTHROCYTE [DISTWIDTH] IN BLOOD BY AUTOMATED COUNT: 13.2 % (ref 12.3–15.4)
HCT VFR BLD AUTO: 39.3 % (ref 37.5–51)
HGB BLD-MCNC: 13.2 G/DL (ref 13–17.7)
HOLD SPECIMEN: NORMAL
HOLD SPECIMEN: NORMAL
IMM GRANULOCYTES # BLD AUTO: 0.02 10*3/MM3 (ref 0–0.05)
IMM GRANULOCYTES NFR BLD AUTO: 0.3 % (ref 0–0.5)
LYMPHOCYTES # BLD AUTO: 1.41 10*3/MM3 (ref 0.7–3.1)
LYMPHOCYTES NFR BLD AUTO: 17.7 % (ref 19.6–45.3)
MCH RBC QN AUTO: 31.1 PG (ref 26.6–33)
MCHC RBC AUTO-ENTMCNC: 33.6 G/DL (ref 31.5–35.7)
MCV RBC AUTO: 92.7 FL (ref 79–97)
MONOCYTES # BLD AUTO: 0.63 10*3/MM3 (ref 0.1–0.9)
MONOCYTES NFR BLD AUTO: 7.9 % (ref 5–12)
NEUTROPHILS NFR BLD AUTO: 5.63 10*3/MM3 (ref 1.7–7)
NEUTROPHILS NFR BLD AUTO: 70.7 % (ref 42.7–76)
NRBC BLD AUTO-RTO: 0 /100 WBC (ref 0–0.2)
PLATELET # BLD AUTO: 199 10*3/MM3 (ref 140–450)
PMV BLD AUTO: 9.5 FL (ref 6–12)
RBC # BLD AUTO: 4.24 10*6/MM3 (ref 4.14–5.8)
WBC NRBC COR # BLD AUTO: 7.96 10*3/MM3 (ref 3.4–10.8)
WHOLE BLOOD HOLD COAG: NORMAL
WHOLE BLOOD HOLD SPECIMEN: NORMAL

## 2024-06-09 PROCEDURE — 36415 COLL VENOUS BLD VENIPUNCTURE: CPT

## 2024-06-09 PROCEDURE — 85025 COMPLETE CBC W/AUTO DIFF WBC: CPT

## 2024-06-09 PROCEDURE — 73080 X-RAY EXAM OF ELBOW: CPT

## 2024-06-09 PROCEDURE — 99283 EMERGENCY DEPT VISIT LOW MDM: CPT

## 2024-06-09 RX ORDER — SULFAMETHOXAZOLE AND TRIMETHOPRIM 800; 160 MG/1; MG/1
1 TABLET ORAL 2 TIMES DAILY
Qty: 14 TABLET | Refills: 0 | Status: SHIPPED | OUTPATIENT
Start: 2024-06-09 | End: 2024-06-16

## 2024-06-09 NOTE — ED PROVIDER NOTES
EMERGENCY DEPARTMENT ENCOUNTER    Pt Name: Zbigniew Andrews  MRN: [id number]  Pt :   1955  Room Number:    Date of encounter:  2024  PCP: Mackenzie Dimas MD  ED Physician: Ryan Burgos DO      HPI:  Chief Complaint: Elbow pain    Context: Zbigniew Andrews is a 69 y.o. male who presents to the ED with chief complaint of right elbow pain.  Past medical history of lymphedema of the right arm status post axillary lipoma surgery.  Sustained a ground-level fall 2 weeks ago were given on his elbow.  Since the fall had experienced pain in the back part of the elbow.  2 days ago developed increasing redness and pain in the elbow.  Yesterday flex his elbow and popped a blister which resulted in pus coming out of the elbow.  Duration constant.  No modifying factors.  No other associated symptoms including fever, chills, chest pain or shortness of breath, numbness or weakness in the extremity, pain with movement of the extremity.  Went to the urgent care locally and this prompted ED evaluation.    PAST MEDICAL HISTORY  Past Medical History:   Diagnosis Date    Arthritis     Back problem     Bleeding tendency     Gout     Hypertension     IBS (irritable bowel syndrome)     Injury of back     Kidney stone     Pneumonia     Skin cancer     Squamous basil cell carinoma, excision-, ,    Sleep apnea     Umbilical hernia     Wound infection     Right knee.      Current Outpatient Medications   Medication Instructions    acetaminophen (TYLENOL) 1,000 mg, Oral, Every 6 Hours PRN    amLODIPine (NORVASC) 5 mg, Oral, Daily    Ascorbic Acid (VITAMIN C PO) Oral, Daily    atorvastatin (LIPITOR) 20 mg, Oral, Daily    B Complex Vitamins (vitamin b complex) capsule capsule 1 capsule, Oral, Daily    bumetanide (BUMEX) 1 mg, Oral, As Needed    Cholecalciferol 4,000 Units, Oral, Daily    Cinnamon 500 mg, Oral, Daily    donepezil (ARICEPT) 10 mg, Oral, Daily    fluticasone (FLONASE) 50 MCG/ACT nasal spray  2 sprays, Nasal, Daily    gabapentin (NEURONTIN) 600 mg, Oral, 2 Times Daily    glucosamine-chondroitin 500-400 MG capsule capsule 1 capsule, Oral, 2 Times Daily With Meals    levocetirizine (XYZAL) 5 mg, Oral, Every Evening    Liraglutide (VICTOZA) 1.8 mg, Subcutaneous, Daily    losartan (COZAAR) 50 mg, Oral, Daily    memantine (NAMENDA) 5 mg, Oral, Daily    methocarbamol (ROBAXIN) 1,000 mg, Oral, 3 Times Daily    Misc Natural Products (BLACK CHERRY CONCENTRATE PO) Oral, 2 times daily    Mucinex 400 mg, Oral, Daily PRN    Multiple Vitamins-Minerals (ZINC PO) Oral    multivitamin (THERAGRAN) tablet tablet Daily    niacin (NIASPAN) 500 mg, Oral, Nightly    NON FORMULARY No dose, route, or frequency recorded.    potassium chloride 10 MEQ CR tablet 10 mEq, Oral, As Needed, With bumex    sertraline (ZOLOFT) 25 mg, Oral, Daily    sulfamethoxazole-trimethoprim (BACTRIM DS,SEPTRA DS) 800-160 MG per tablet 1 tablet, Oral, 2 Times Daily    tamsulosin (FLOMAX) 0.4 mg, Oral, Daily    topiramate (TOPAMAX) 25 mg, Oral, Daily    traMADol (ULTRAM) 25 mg, Oral, Every 12 Hours PRN    Turmeric (QC TUMERIC COMPLEX PO) Oral, Daily        PAST SURGICAL HISTORY  Past Surgical History:   Procedure Laterality Date    COLONOSCOPY      ENDOSCOPY      GASTRIC SLEEVE LAPAROSCOPIC      HERNIA REPAIR      PYLOROMYOTOMY  1955    SHOULDER ARTHROSCOPY W/ ROTATOR CUFF REPAIR Right 10/21/2021    Procedure: Shoulder diagnostic arthroscopy, synovectomy, labral debridement, biceps tenolysis subacromial decompression, bursectomy, distal clavicle excision and mini open rotator cuff repair.;  Surgeon: Venancio Espinosa MD;  Location: [facility];  Service: Orthopedics;  Laterality: Right;    SKIN BIOPSY      UMBILICAL HERNIA REPAIR  2000    VENA CAVA FILTER INSERTION         FAMILY HISTORY  Family History   Problem Relation Age of Onset    Hypertension Mother     Cancer Mother     Arthritis Mother     Asthma Mother     Ovarian cancer Mother      Hypertension Father     Heart disease Father     Arthritis Father     Kidney disease Father     Alzheimer's disease Father     Kidney failure Father     Arthritis Sister     Arthritis Brother     Hypertension Brother     Hepatitis Brother     Atrial fibrillation Brother     Alzheimer's disease Maternal Grandfather        SOCIAL HISTORY  Social History     Socioeconomic History    Marital status:    Tobacco Use    Smoking status: Never     Passive exposure: Never    Smokeless tobacco: Never   Vaping Use    Vaping status: Never Used   Substance and Sexual Activity    Alcohol use: Not Currently     Comment: Social, quit [id number]    Drug use: Never    Sexual activity: Defer       ALLERGIES  Patient has no known allergies.    REVIEW OF SYSTEMS  All systems reviewed and negative except for those discussed in HPI.     PHYSICAL EXAM  ED Triage Vitals [06/09/24 1336]   Temp Heart Rate Resp BP SpO2   98.1 °F (36.7 °C) 61 18 156/83 97 %      Temp src Heart Rate Source Patient Position BP Location FiO2 (%)   -- -- -- -- --     I have reviewed the triage vital signs and nursing notes.    General: Alert.  Nontoxic appearance.  No acute distress.  Head: Normocephalic.  Atraumatic.  Eyes: No scleral icterus.  Cardiovascular: Regular rate and rhythm.  No murmurs.  No rubs.  2+ distal pulses bilaterally.  Respiratory: Equal breath sounds bilaterally.  No rales.  No rhonchi.  No wheezing.  GI: Abdomen is nondistended.  Neurologic: Oriented x 3.  No focal deficits.  Skin: Chronic lymphedema to the right upper extremity.  Erythema and soft tissue edema with active drainage to the right posterior elbow. No crepitus or palpable induration.  MSK: No bony tenderness to the right upper extremity.  Full range of motion of the right elbow joint without pain. Strength 5/5 with thumb opposition, finger abduction, and wrist extension.  Sensation intact in the medial, radial, and ulnar nerve root distributions.  Normal capillary  refill.      LAB RESULTS  Recent Results (from the past 24 hour(s))   CBC Auto Differential    Collection Time: 06/09/24  2:05 PM    Specimen: Blood   Result Value Ref Range    WBC 7.96 3.40 - 10.80 10*3/mm3    RBC 4.24 4.14 - 5.80 10*6/mm3    Hemoglobin 13.2 13.0 - 17.7 g/dL    Hematocrit 39.3 37.5 - 51.0 %    MCV 92.7 79.0 - 97.0 fL    MCH 31.1 26.6 - 33.0 pg    MCHC 33.6 31.5 - 35.7 g/dL    RDW 13.2 12.3 - 15.4 %    RDW-SD 45.2 37.0 - 54.0 fl    MPV 9.5 6.0 - 12.0 fL    Platelets 199 140 - 450 10*3/mm3    Neutrophil % 70.7 42.7 - 76.0 %    Lymphocyte % 17.7 (L) 19.6 - 45.3 %    Monocyte % 7.9 5.0 - 12.0 %    Eosinophil % 2.8 0.3 - 6.2 %    Basophil % 0.6 0.0 - 1.5 %    Immature Grans % 0.3 0.0 - 0.5 %    Neutrophils, Absolute 5.63 1.70 - 7.00 10*3/mm3    Lymphocytes, Absolute 1.41 0.70 - 3.10 10*3/mm3    Monocytes, Absolute 0.63 0.10 - 0.90 10*3/mm3    Eosinophils, Absolute 0.22 0.00 - 0.40 10*3/mm3    Basophils, Absolute 0.05 0.00 - 0.20 10*3/mm3    Immature Grans, Absolute 0.02 0.00 - 0.05 10*3/mm3    nRBC 0.0 0.0 - 0.2 /100 WBC   Green Top (Gel)    Collection Time: 06/09/24  2:05 PM   Result Value Ref Range    Extra Tube Hold for add-ons.    Lavender Top    Collection Time: 06/09/24  2:05 PM   Result Value Ref Range    Extra Tube hold for add-on    Gold Top - SST    Collection Time: 06/09/24  2:05 PM   Result Value Ref Range    Extra Tube Hold for add-ons.    Light Blue Top    Collection Time: 06/09/24  2:05 PM   Result Value Ref Range    Extra Tube Hold for add-ons.        RADIOLOGY  XR Elbow 3+ View Right    Result Date: 6/9/2024  PROCEDURE: XR ELBOW 3+ VW RIGHT-  HISTORY: Injury  FINDINGS:  Three views show no evidence of acute fracture or dislocation. Mild degenerative changes are noted. A posterior olecranon enthesophyte is noted. Soft tissue swelling is seen overlying the olecranon. No foreign body is identified.      No acute bony abnormality identified.  Soft tissue swelling overlying the olecranon.       This report was signed and finalized on 6/9/2024 2:59 PM by Avelino Torres MD.       PROCEDURES  Procedures    MEDICATIONS GIVEN IN ER  Medications - No data to display    MEDICAL DECISION MAKING  69 y.o. male with past medical history listed above who presents with pain, redness, swelling to the right posterior elbow.    Vital signs remarkable for mild hypertension, otherwise within normal limits.  Patient arrives afebrile.    Based on clinical presentation and physical exam, differential diagnosis includes, but is not limited to, infected bursitis, draining superficial abscess, cellulitis.  No clinical evidence of septic arthritis, distal neurovascular compromise, arterial etiology, compartment syndrome, sepsis.    At least 3 different tests have been ordered on this patient.    Please see ED course below for my interpretation of the ED workup.  ED Course as of 06/09/24 1530   Sun Jun 09, 2024   1501 XR Elbow 3+ View Right  I have independently reviewed and interpreted the x-ray right elbow.  My interpretation is negative for fracture or dislocation.    [JS]   1511 I reviewed the labs listed above. Clinically unremarkable. [JS]      ED Course User Index  [JS] Ryan Burgos, DO      No clinical negation for I&D as abscess is already actively draining.    On re-evaluation, patient resting comfortably.  States symptoms have improved following therapy. Vital signs remained stable on room air.  Patient was ambulatory in the ED with steady gait.  Able to tolerate oral intake appropriately.    I discussed the findings of the ED workup with the patient at bedside.  No clinical indication for admission.  I recommended outpatient follow-up with PCP/orthopedics.  Patient was deemed medically stable for discharge with close outpatient follow-up and strict ED return precautions. Patient agreeable with plan and disposition.    Home medications were reviewed.  Prescriptions for discharge: Bactrim    Chronic conditions  affecting care: None    Social determinants of health impacting treatment or disposition: None    REPEAT VITAL SIGNS  AS OF 15:03 EDT VITALS:  BP - 156/83  HR - 61  TEMP - 98.1 °F (36.7 °C)  O2 SATS - 97%    DIAGNOSIS  Final diagnoses:   Abscess of bursa of right elbow       DISPOSITION  ED Disposition       ED Disposition   Discharge    Condition   Stable    Comment   --                     Please note that portions of this document were completed with voice recognition software.        Ryan Burgos,   06/09/24 1557

## 2024-06-11 ENCOUNTER — TELEPHONE (OUTPATIENT)
Dept: CARDIOLOGY | Facility: CLINIC | Age: 69
End: 2024-06-11
Payer: MEDICARE

## 2024-06-11 NOTE — TELEPHONE ENCOUNTER
Hub staff attempted to follow warm transfer process and was unsuccessful     Caller: MESFIN WEST    Relationship to patient: Emergency Contact    Best call back number: [phone]    Patient is needing: PATIENT BLOOD PRESSURE RUNNING LOW, AS WELL AS FAINTING DUE TO IT. WIFE CALLED TO SEE IF HE COULD BE WORKED IN SOONER AS THIS HAS BEEN AN ISSUE FOR A FEW DAYS
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
## 2024-06-12 ENCOUNTER — OFFICE VISIT (OUTPATIENT)
Dept: CARDIOLOGY | Facility: CLINIC | Age: 69
End: 2024-06-12
Payer: MEDICARE

## 2024-06-12 VITALS
OXYGEN SATURATION: 98 % | DIASTOLIC BLOOD PRESSURE: 64 MMHG | BODY MASS INDEX: 47.74 KG/M2 | SYSTOLIC BLOOD PRESSURE: 108 MMHG | WEIGHT: 315 LBS | HEART RATE: 81 BPM | HEIGHT: 68 IN

## 2024-06-12 DIAGNOSIS — I95.1 ORTHOSTATIC HYPOTENSION: Primary | ICD-10-CM

## 2024-06-12 DIAGNOSIS — I10 PRIMARY HYPERTENSION: Chronic | ICD-10-CM

## 2024-06-12 DIAGNOSIS — I44.1 MOBITZ TYPE 1 SECOND DEGREE AV BLOCK: Chronic | ICD-10-CM

## 2024-06-12 DIAGNOSIS — G47.33 OBSTRUCTIVE SLEEP APNEA SYNDROME: Chronic | ICD-10-CM

## 2024-06-12 DIAGNOSIS — E78.49 OTHER HYPERLIPIDEMIA: Chronic | ICD-10-CM

## 2024-06-12 RX ORDER — ATORVASTATIN CALCIUM 20 MG/1
20 TABLET, FILM COATED ORAL DAILY
Qty: 90 TABLET | Refills: 3 | Status: SHIPPED | OUTPATIENT
Start: 2024-06-12

## 2024-07-28 DIAGNOSIS — T78.40XA ALLERGY, INITIAL ENCOUNTER: ICD-10-CM

## 2024-07-29 RX ORDER — LEVOCETIRIZINE DIHYDROCHLORIDE 5 MG/1
5 TABLET, FILM COATED ORAL EVERY EVENING
Qty: 90 TABLET | Refills: 1 | Status: SHIPPED | OUTPATIENT
Start: 2024-07-29

## 2024-08-24 ENCOUNTER — LAB (OUTPATIENT)
Dept: LAB | Facility: HOSPITAL | Age: 69
End: 2024-08-24
Payer: MEDICARE

## 2024-08-24 LAB
ALBUMIN SERPL-MCNC: 3.9 G/DL (ref 3.5–5.2)
ALBUMIN/GLOB SERPL: 1.3 G/DL
ALP SERPL-CCNC: 106 U/L (ref 39–117)
ALT SERPL W P-5'-P-CCNC: 15 U/L (ref 1–41)
ANION GAP SERPL CALCULATED.3IONS-SCNC: 8 MMOL/L (ref 5–15)
AST SERPL-CCNC: 14 U/L (ref 1–40)
BASOPHILS # BLD AUTO: 0.05 10*3/MM3 (ref 0–0.2)
BASOPHILS NFR BLD AUTO: 0.8 % (ref 0–1.5)
BILIRUB SERPL-MCNC: 0.4 MG/DL (ref 0–1.2)
BUN SERPL-MCNC: 24 MG/DL (ref 8–23)
BUN/CREAT SERPL: 17.5 (ref 7–25)
CALCIUM SPEC-SCNC: 9 MG/DL (ref 8.6–10.5)
CHLORIDE SERPL-SCNC: 109 MMOL/L (ref 98–107)
CHOLEST SERPL-MCNC: 95 MG/DL (ref 0–200)
CO2 SERPL-SCNC: 23 MMOL/L (ref 22–29)
CREAT SERPL-MCNC: 1.37 MG/DL (ref 0.76–1.27)
DEPRECATED RDW RBC AUTO: 41.6 FL (ref 37–54)
EGFRCR SERPLBLD CKD-EPI 2021: 55.5 ML/MIN/1.73
EOSINOPHIL # BLD AUTO: 0.21 10*3/MM3 (ref 0–0.4)
EOSINOPHIL NFR BLD AUTO: 3.3 % (ref 0.3–6.2)
ERYTHROCYTE [DISTWIDTH] IN BLOOD BY AUTOMATED COUNT: 12.4 % (ref 12.3–15.4)
GLOBULIN UR ELPH-MCNC: 2.9 GM/DL
GLUCOSE SERPL-MCNC: 90 MG/DL (ref 65–99)
HBA1C MFR BLD: 5.3 % (ref 4.8–5.6)
HCT VFR BLD AUTO: 39.7 % (ref 37.5–51)
HDLC SERPL-MCNC: 43 MG/DL (ref 40–60)
HGB BLD-MCNC: 13.1 G/DL (ref 13–17.7)
IMM GRANULOCYTES # BLD AUTO: 0.02 10*3/MM3 (ref 0–0.05)
IMM GRANULOCYTES NFR BLD AUTO: 0.3 % (ref 0–0.5)
LDLC SERPL CALC-MCNC: 36 MG/DL (ref 0–100)
LDLC/HDLC SERPL: 0.85 {RATIO}
LYMPHOCYTES # BLD AUTO: 1.57 10*3/MM3 (ref 0.7–3.1)
LYMPHOCYTES NFR BLD AUTO: 24.8 % (ref 19.6–45.3)
MCH RBC QN AUTO: 30.4 PG (ref 26.6–33)
MCHC RBC AUTO-ENTMCNC: 33 G/DL (ref 31.5–35.7)
MCV RBC AUTO: 92.1 FL (ref 79–97)
MONOCYTES # BLD AUTO: 0.62 10*3/MM3 (ref 0.1–0.9)
MONOCYTES NFR BLD AUTO: 9.8 % (ref 5–12)
NEUTROPHILS NFR BLD AUTO: 3.86 10*3/MM3 (ref 1.7–7)
NEUTROPHILS NFR BLD AUTO: 61 % (ref 42.7–76)
NRBC BLD AUTO-RTO: 0 /100 WBC (ref 0–0.2)
PLATELET # BLD AUTO: 207 10*3/MM3 (ref 140–450)
PMV BLD AUTO: 11.2 FL (ref 6–12)
POTASSIUM SERPL-SCNC: 4.7 MMOL/L (ref 3.5–5.2)
PROT SERPL-MCNC: 6.8 G/DL (ref 6–8.5)
PSA SERPL-MCNC: 0.8 NG/ML (ref 0–4)
RBC # BLD AUTO: 4.31 10*6/MM3 (ref 4.14–5.8)
SODIUM SERPL-SCNC: 140 MMOL/L (ref 136–145)
TRIGL SERPL-MCNC: 78 MG/DL (ref 0–150)
TSH SERPL DL<=0.05 MIU/L-ACNC: 0.91 UIU/ML (ref 0.27–4.2)
VLDLC SERPL-MCNC: 16 MG/DL (ref 5–40)
WBC NRBC COR # BLD AUTO: 6.33 10*3/MM3 (ref 3.4–10.8)

## 2024-08-24 PROCEDURE — 80061 LIPID PANEL: CPT | Performed by: STUDENT IN AN ORGANIZED HEALTH CARE EDUCATION/TRAINING PROGRAM

## 2024-08-24 PROCEDURE — 85025 COMPLETE CBC W/AUTO DIFF WBC: CPT | Performed by: STUDENT IN AN ORGANIZED HEALTH CARE EDUCATION/TRAINING PROGRAM

## 2024-08-24 PROCEDURE — G0103 PSA SCREENING: HCPCS | Performed by: STUDENT IN AN ORGANIZED HEALTH CARE EDUCATION/TRAINING PROGRAM

## 2024-08-24 PROCEDURE — 83036 HEMOGLOBIN GLYCOSYLATED A1C: CPT | Performed by: STUDENT IN AN ORGANIZED HEALTH CARE EDUCATION/TRAINING PROGRAM

## 2024-08-24 PROCEDURE — 80053 COMPREHEN METABOLIC PANEL: CPT | Performed by: STUDENT IN AN ORGANIZED HEALTH CARE EDUCATION/TRAINING PROGRAM

## 2024-08-24 PROCEDURE — 87522 HEPATITIS C REVRS TRNSCRPJ: CPT | Performed by: STUDENT IN AN ORGANIZED HEALTH CARE EDUCATION/TRAINING PROGRAM

## 2024-08-24 PROCEDURE — 84443 ASSAY THYROID STIM HORMONE: CPT | Performed by: STUDENT IN AN ORGANIZED HEALTH CARE EDUCATION/TRAINING PROGRAM

## 2024-08-26 ENCOUNTER — OFFICE VISIT (OUTPATIENT)
Dept: INTERNAL MEDICINE | Facility: CLINIC | Age: 69
End: 2024-08-26
Payer: MEDICARE

## 2024-08-26 VITALS
OXYGEN SATURATION: 96 % | BODY MASS INDEX: 47.74 KG/M2 | SYSTOLIC BLOOD PRESSURE: 142 MMHG | RESPIRATION RATE: 16 BRPM | WEIGHT: 315 LBS | DIASTOLIC BLOOD PRESSURE: 66 MMHG | HEIGHT: 68 IN | HEART RATE: 61 BPM

## 2024-08-26 DIAGNOSIS — R73.9 HYPERGLYCEMIA: ICD-10-CM

## 2024-08-26 DIAGNOSIS — I10 PRIMARY HYPERTENSION: Primary | ICD-10-CM

## 2024-08-26 DIAGNOSIS — R41.3 MEMORY LOSS: ICD-10-CM

## 2024-08-26 DIAGNOSIS — G47.33 OBSTRUCTIVE SLEEP APNEA SYNDROME: ICD-10-CM

## 2024-08-26 DIAGNOSIS — N40.1 BENIGN PROSTATIC HYPERPLASIA WITH URINARY OBSTRUCTION: ICD-10-CM

## 2024-08-26 DIAGNOSIS — N13.8 BENIGN PROSTATIC HYPERPLASIA WITH URINARY OBSTRUCTION: ICD-10-CM

## 2024-08-26 DIAGNOSIS — M1A.0720 IDIOPATHIC CHRONIC GOUT OF LEFT FOOT WITHOUT TOPHUS: ICD-10-CM

## 2024-08-26 DIAGNOSIS — F32.0 CURRENT MILD EPISODE OF MAJOR DEPRESSIVE DISORDER WITHOUT PRIOR EPISODE: ICD-10-CM

## 2024-08-26 DIAGNOSIS — F41.9 ANXIETY: ICD-10-CM

## 2024-08-26 DIAGNOSIS — E78.49 OTHER HYPERLIPIDEMIA: ICD-10-CM

## 2024-08-26 PROBLEM — M54.2 NECK PAIN: Status: RESOLVED | Noted: 2022-11-09 | Resolved: 2024-08-26

## 2024-08-26 PROCEDURE — 3078F DIAST BP <80 MM HG: CPT | Performed by: STUDENT IN AN ORGANIZED HEALTH CARE EDUCATION/TRAINING PROGRAM

## 2024-08-26 PROCEDURE — 3077F SYST BP >= 140 MM HG: CPT | Performed by: STUDENT IN AN ORGANIZED HEALTH CARE EDUCATION/TRAINING PROGRAM

## 2024-08-26 PROCEDURE — G2211 COMPLEX E/M VISIT ADD ON: HCPCS | Performed by: STUDENT IN AN ORGANIZED HEALTH CARE EDUCATION/TRAINING PROGRAM

## 2024-08-26 PROCEDURE — 99214 OFFICE O/P EST MOD 30 MIN: CPT | Performed by: STUDENT IN AN ORGANIZED HEALTH CARE EDUCATION/TRAINING PROGRAM

## 2024-08-26 PROCEDURE — 1125F AMNT PAIN NOTED PAIN PRSNT: CPT | Performed by: STUDENT IN AN ORGANIZED HEALTH CARE EDUCATION/TRAINING PROGRAM

## 2024-08-26 RX ORDER — TOPIRAMATE 50 MG/1
50 TABLET, FILM COATED ORAL
COMMUNITY
Start: 2024-08-12

## 2024-08-26 NOTE — ASSESSMENT & PLAN NOTE
on amlodipine, losartan  Stable on current medication and dosage. Will continue current management. Refill medication as necessary.
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
## 2024-08-26 NOTE — ASSESSMENT & PLAN NOTE
Stable on current medication and dosage. Will continue current management. Refill medication as necessary.  Atorvastatin 20

## 2024-08-26 NOTE — ASSESSMENT & PLAN NOTE
Asymptomatic, on as needed NSAIDs during flareups, no recent flare  Obtain uric acid prior to next visit

## 2024-08-27 LAB
HCV RNA SERPL NAA+PROBE-ACNC: NORMAL IU/ML
TEST INFORMATION: NORMAL
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
## 2024-09-10 ENCOUNTER — OFFICE VISIT (OUTPATIENT)
Dept: CARDIOLOGY | Facility: CLINIC | Age: 69
End: 2024-09-10
Payer: MEDICARE

## 2024-09-10 VITALS
SYSTOLIC BLOOD PRESSURE: 122 MMHG | WEIGHT: 315 LBS | HEIGHT: 68 IN | BODY MASS INDEX: 47.74 KG/M2 | DIASTOLIC BLOOD PRESSURE: 60 MMHG | HEART RATE: 61 BPM | OXYGEN SATURATION: 98 %

## 2024-09-10 DIAGNOSIS — I10 PRIMARY HYPERTENSION: Primary | ICD-10-CM

## 2024-09-10 DIAGNOSIS — I44.1 MOBITZ TYPE 1 SECOND DEGREE AV BLOCK: ICD-10-CM

## 2024-09-10 DIAGNOSIS — E78.49 OTHER HYPERLIPIDEMIA: ICD-10-CM

## 2024-09-10 DIAGNOSIS — G47.33 OBSTRUCTIVE SLEEP APNEA SYNDROME: ICD-10-CM

## 2024-09-10 PROCEDURE — 3078F DIAST BP <80 MM HG: CPT | Performed by: INTERNAL MEDICINE

## 2024-09-10 PROCEDURE — 99214 OFFICE O/P EST MOD 30 MIN: CPT | Performed by: INTERNAL MEDICINE

## 2024-09-10 PROCEDURE — 3074F SYST BP LT 130 MM HG: CPT | Performed by: INTERNAL MEDICINE

## 2024-09-25 RX ORDER — LOSARTAN POTASSIUM 50 MG/1
50 TABLET ORAL DAILY
Qty: 90 TABLET | Refills: 1 | Status: SHIPPED | OUTPATIENT
Start: 2024-09-25

## 2024-12-06 ENCOUNTER — TELEPHONE (OUTPATIENT)
Dept: INTERNAL MEDICINE | Facility: CLINIC | Age: 69
End: 2024-12-06
Payer: MEDICARE

## 2024-12-06 RX ORDER — SERTRALINE HYDROCHLORIDE 25 MG/1
25 TABLET, FILM COATED ORAL DAILY
Qty: 90 TABLET | Refills: 1 | Status: SHIPPED | OUTPATIENT
Start: 2024-12-06

## 2024-12-06 RX ORDER — TAMSULOSIN HYDROCHLORIDE 0.4 MG/1
1 CAPSULE ORAL DAILY
Qty: 90 CAPSULE | Refills: 1 | Status: SHIPPED | OUTPATIENT
Start: 2024-12-06

## 2024-12-06 NOTE — TELEPHONE ENCOUNTER
Name: MESFIN WEST    Relationship: Emergency Contact    Best Callback Number: [phone]    HUB PROVIDED THE RELAY MESSAGE FROM THE OFFICE   PATIENT VOICED UNDERSTANDING AND HAS NO FURTHER QUESTIONS AT THIS TIME    ADDITIONAL INFORMATION:     NED[facility],  [id number] - [place], [place] - [address] AT [facility] [address] - [phone]  - [phone]

## 2024-12-06 NOTE — TELEPHONE ENCOUNTER
"LVM for pt to return call to our office.    Relay     \"We received a refill request on two meds. Is patient now using [facility] pharmacy for maintenance meds?\"                  "

## 2025-02-12 ENCOUNTER — OFFICE VISIT (OUTPATIENT)
Dept: CARDIOLOGY | Facility: CLINIC | Age: 70
End: 2025-02-12
Payer: MEDICARE

## 2025-02-12 VITALS
HEART RATE: 59 BPM | SYSTOLIC BLOOD PRESSURE: 140 MMHG | DIASTOLIC BLOOD PRESSURE: 86 MMHG | HEIGHT: 68 IN | BODY MASS INDEX: 47.74 KG/M2 | OXYGEN SATURATION: 98 % | WEIGHT: 315 LBS

## 2025-02-12 DIAGNOSIS — E66.813 CLASS 3 OBESITY WITH ALVEOLAR HYPOVENTILATION WITHOUT SERIOUS COMORBIDITY WITH BODY MASS INDEX (BMI) OF 50.0 TO 59.9 IN ADULT: ICD-10-CM

## 2025-02-12 DIAGNOSIS — E66.2 CLASS 3 OBESITY WITH ALVEOLAR HYPOVENTILATION WITHOUT SERIOUS COMORBIDITY WITH BODY MASS INDEX (BMI) OF 50.0 TO 59.9 IN ADULT: ICD-10-CM

## 2025-02-12 DIAGNOSIS — I10 PRIMARY HYPERTENSION: Primary | ICD-10-CM

## 2025-02-12 PROCEDURE — 99212 OFFICE O/P EST SF 10 MIN: CPT | Performed by: INTERNAL MEDICINE

## 2025-02-12 PROCEDURE — 3079F DIAST BP 80-89 MM HG: CPT | Performed by: INTERNAL MEDICINE

## 2025-02-12 PROCEDURE — 3077F SYST BP >= 140 MM HG: CPT | Performed by: INTERNAL MEDICINE

## 2025-02-12 NOTE — PROGRESS NOTES
[facility] Cardiology Office Follow Up Note    Zbigniew Andrews  [id number]  2025    Primary Care Provider: Mackenzie Dimas MD    Chief Complaint: Routine follow-up    History of Present Illness:   Mr. Zbigniew Andrews is a 69y.o. male who presents to the Cardiology Clinic for routine follow-up.  The patient has a past medical history significant for hypertension, gout, BPH, and obesity with a BMI 50 kg/m².  He does not have any significant past cardiac history.  He reports undergoing ischemic evaluation including a coronary angiogram in [place] in , with no significant CAD at that time.  Since his last appointment, the patient has been doing well without significant changes in his health.  He continues to deny any chest pain or exertional chest discomfort.  No significant exertional dyspnea.  He is tolerating his current medications without difficulty.  He has not been checking his blood pressure on a regular basis at home.  No specific complaints today.        Past Cardiac Testin. Last Coronary Angio: , reportedly no significant CAD  2. Prior Stress Testing: None known  3. Last Echo: Records pending  4. Prior Holter Monitor: None    Review of Systems:   Review of Systems   Constitutional:  Negative for activity change, appetite change, chills, diaphoresis, fatigue, fever, unexpected weight gain and unexpected weight loss.   Eyes:  Negative for blurred vision and double vision.   Respiratory:  Negative for cough, chest tightness, shortness of breath and wheezing.    Cardiovascular:  Negative for chest pain, palpitations and leg swelling.   Gastrointestinal:  Negative for abdominal pain, anal bleeding, blood in stool and GERD.   Endocrine: Negative for cold intolerance and heat intolerance.   Genitourinary:  Negative for hematuria.   Neurological:  Negative for dizziness, syncope, weakness and light-headedness.   Hematological:  Does not bruise/bleed easily.    Psychiatric/Behavioral:  Negative for depressed mood and stress. The patient is not nervous/anxious.        I have reviewed and/or updated the patient's past medical, past surgical, family, social history, problem list and allergies as appropriate.     Medications:     Current Outpatient Medications:     acetaminophen (TYLENOL) 500 MG tablet, Take 2 tablets by mouth Every 6 (Six) Hours As Needed for Mild Pain., Disp: , Rfl:     albuterol sulfate  (90 Base) MCG/ACT inhaler, Inhale 2 puffs Every 4 (Four) Hours As Needed for Wheezing or Shortness of Air., Disp: 18 g, Rfl: 0    Ascorbic Acid (VITAMIN C PO), Take  by mouth Daily., Disp: , Rfl:     atorvastatin (LIPITOR) 20 MG tablet, Take 1 tablet by mouth Daily., Disp: 90 tablet, Rfl: 3    B Complex Vitamins (vitamin b complex) capsule capsule, Take 1 capsule by mouth Daily., Disp: , Rfl:     bumetanide (BUMEX) 1 MG tablet, Take 1 tablet by mouth As Needed., Disp: , Rfl:     Cholecalciferol 50 MCG (2000 UT) tablet, Take 2 tablets by mouth Daily., Disp: , Rfl:     Cinnamon 500 MG capsule, Take 1 capsule by mouth Daily., Disp: , Rfl:     donepezil (ARICEPT) 10 MG tablet, Take 1 tablet by mouth Daily., Disp: 90 tablet, Rfl: 1    fluticasone (FLONASE) 50 MCG/ACT nasal spray, Administer 2 sprays into the nostril(s) as directed by provider Daily., Disp: , Rfl:     gabapentin (NEURONTIN) 600 MG tablet, Take 1 tablet by mouth 2 (Two) Times a Day., Disp: , Rfl:     glucosamine-chondroitin 500-400 MG capsule capsule, Take 1 capsule by mouth 2 (Two) Times a Day With Meals., Disp: , Rfl:     guaiFENesin (Mucinex) 600 MG 12 hr tablet, Take 400 mg by mouth Daily As Needed., Disp: , Rfl:     levocetirizine (XYZAL) 5 MG tablet, TAKE 1 TABLET BY MOUTH EVERY EVENING, Disp: 90 tablet, Rfl: 1    losartan (COZAAR) 50 MG tablet, TAKE 1 TABLET BY MOUTH EVERY DAY, Disp: 90 tablet, Rfl: 1    methocarbamol (ROBAXIN) 500 MG tablet, Take 2 tablets by mouth 3 (Three) Times a Day., Disp: ,  "Rfl:     Misc Natural Products (BLACK CHERRY CONCENTRATE PO), Take  by mouth 2 (two) times a day., Disp: , Rfl:     Multiple Vitamins-Minerals (ZINC PO), Take  by mouth., Disp: , Rfl:     multivitamin (THERAGRAN) tablet tablet, Daily., Disp: , Rfl:     niacin (NIASPAN) 500 MG CR tablet, Take 1 tablet by mouth Every Night., Disp: , Rfl:     NON FORMULARY, , Disp: , Rfl:     potassium chloride 10 MEQ CR tablet, Take 1 tablet by mouth As Needed. With bumex, Disp: , Rfl:     predniSONE (DELTASONE) 20 MG tablet, Take 1 tablet by mouth 2 (Two) Times a Day., Disp: 10 tablet, Rfl: 0    sertraline (ZOLOFT) 25 MG tablet, TAKE 1 TABLET BY MOUTH DAILY, Disp: 90 tablet, Rfl: 1    tamsulosin (FLOMAX) 0.4 MG capsule 24 hr capsule, TAKE 1 CAPSULE BY MOUTH DAILY, Disp: 90 capsule, Rfl: 1    topiramate (TOPAMAX) 50 MG tablet, Take 1 tablet by mouth every night at bedtime., Disp: , Rfl:     traMADol (ULTRAM) 50 MG tablet, Take 0.5 tablets by mouth Every 12 (Twelve) Hours As Needed for Moderate Pain. (Patient taking differently: Take 0.5 tablets by mouth Every 8 (Eight) Hours As Needed for Moderate Pain.), Disp: 3 tablet, Rfl: 0    Turmeric (QC TUMERIC COMPLEX PO), Take  by mouth Daily., Disp: , Rfl:     memantine (NAMENDA) 5 MG tablet, Take 1 tablet by mouth Daily. (Patient not taking: Reported on 2/12/2025), Disp: 90 tablet, Rfl: 1    Physical Exam:  Vital Signs:   Vitals:    02/12/25 1144   BP: 140/86   BP Location: Right arm   Patient Position: Sitting   Cuff Size: Adult   Pulse: 59   SpO2: 98%   Weight: (!) 151 kg (333 lb 3.2 oz)   Height: 172.7 cm (68\")       Physical Exam  Constitutional:       General: He is not in acute distress.     Appearance: He is obese. He is not diaphoretic.   HENT:      Head: Normocephalic and atraumatic.   Cardiovascular:      Rate and Rhythm: Normal rate and regular rhythm.      Heart sounds: No murmur heard.  Pulmonary:      Effort: Pulmonary effort is normal. No respiratory distress.      Breath " sounds: Normal breath sounds. No stridor. No wheezing, rhonchi or rales.   Abdominal:      General: Bowel sounds are normal. There is no distension.      Palpations: Abdomen is soft.      Tenderness: There is no abdominal tenderness. There is no guarding or rebound.   Musculoskeletal:         General: No swelling. Normal range of motion.      Cervical back: Neck supple. No tenderness.   Skin:     General: Skin is warm and dry.   Neurological:      General: No focal deficit present.      Mental Status: He is alert and oriented to person, place, and time.   Psychiatric:         Mood and Affect: Mood normal.         Behavior: Behavior normal.         Results Review:   I reviewed the patient's new clinical results.      Assessment / Plan:     1.  Hypertension  -- While the patient is hypertensive today, recent BP readings show systolic BP as low as 101  --Advised to start monitoring blood pressure at home, notify me if his systolic BP is maintaining greater than 140 mmHg in which case would consider restarting low-dose amlodipine  --Continue losartan  --Follow-up in 1 year, sooner if required     2.  Morbid obesity with BMI of 50.0-59.9  -- Weight loss advised    Follow Up:   Return in about 1 year (around 2/12/2026).      Thank you for allowing me to participate in the care of your patient. Please to not hesitate to contact me with additional questions or concerns.     ELEAZAR Chavarria MD  Interventional Cardiology   02/12/2025  11:48 EST

## 2025-02-15 ENCOUNTER — LAB (OUTPATIENT)
Dept: LAB | Facility: HOSPITAL | Age: 70
End: 2025-02-15
Payer: MEDICARE

## 2025-02-15 LAB
ALBUMIN SERPL-MCNC: 3.4 G/DL (ref 3.5–5.2)
ALBUMIN/GLOB SERPL: 1.3 G/DL
ALP SERPL-CCNC: 99 U/L (ref 39–117)
ALT SERPL W P-5'-P-CCNC: 16 U/L (ref 1–41)
ANION GAP SERPL CALCULATED.3IONS-SCNC: 8 MMOL/L (ref 5–15)
AST SERPL-CCNC: 18 U/L (ref 1–40)
BASOPHILS # BLD AUTO: 0.03 10*3/MM3 (ref 0–0.2)
BASOPHILS NFR BLD AUTO: 0.4 % (ref 0–1.5)
BILIRUB SERPL-MCNC: 0.4 MG/DL (ref 0–1.2)
BUN SERPL-MCNC: 26 MG/DL (ref 8–23)
BUN/CREAT SERPL: 22.4 (ref 7–25)
CALCIUM SPEC-SCNC: 8.5 MG/DL (ref 8.6–10.5)
CHLORIDE SERPL-SCNC: 111 MMOL/L (ref 98–107)
CHOLEST SERPL-MCNC: 109 MG/DL (ref 0–200)
CO2 SERPL-SCNC: 23 MMOL/L (ref 22–29)
CREAT SERPL-MCNC: 1.16 MG/DL (ref 0.76–1.27)
DEPRECATED RDW RBC AUTO: 43.3 FL (ref 37–54)
EGFRCR SERPLBLD CKD-EPI 2021: 67.8 ML/MIN/1.73
EOSINOPHIL # BLD AUTO: 0.08 10*3/MM3 (ref 0–0.4)
EOSINOPHIL NFR BLD AUTO: 1 % (ref 0.3–6.2)
ERYTHROCYTE [DISTWIDTH] IN BLOOD BY AUTOMATED COUNT: 12.7 % (ref 12.3–15.4)
GLOBULIN UR ELPH-MCNC: 2.6 GM/DL
GLUCOSE SERPL-MCNC: 74 MG/DL (ref 65–99)
HBA1C MFR BLD: 5.8 % (ref 4.8–5.6)
HCT VFR BLD AUTO: 43.1 % (ref 37.5–51)
HDLC SERPL-MCNC: 44 MG/DL (ref 40–60)
HGB BLD-MCNC: 14.5 G/DL (ref 13–17.7)
IMM GRANULOCYTES # BLD AUTO: 0.12 10*3/MM3 (ref 0–0.05)
IMM GRANULOCYTES NFR BLD AUTO: 1.5 % (ref 0–0.5)
LDLC SERPL CALC-MCNC: 42 MG/DL (ref 0–100)
LDLC/HDLC SERPL: 0.89 {RATIO}
LYMPHOCYTES # BLD AUTO: 2.02 10*3/MM3 (ref 0.7–3.1)
LYMPHOCYTES NFR BLD AUTO: 25.2 % (ref 19.6–45.3)
MCH RBC QN AUTO: 31.5 PG (ref 26.6–33)
MCHC RBC AUTO-ENTMCNC: 33.6 G/DL (ref 31.5–35.7)
MCV RBC AUTO: 93.5 FL (ref 79–97)
MONOCYTES # BLD AUTO: 0.82 10*3/MM3 (ref 0.1–0.9)
MONOCYTES NFR BLD AUTO: 10.2 % (ref 5–12)
NEUTROPHILS NFR BLD AUTO: 4.96 10*3/MM3 (ref 1.7–7)
NEUTROPHILS NFR BLD AUTO: 61.7 % (ref 42.7–76)
NRBC BLD AUTO-RTO: 0 /100 WBC (ref 0–0.2)
PLATELET # BLD AUTO: 209 10*3/MM3 (ref 140–450)
PMV BLD AUTO: 10.8 FL (ref 6–12)
POTASSIUM SERPL-SCNC: 4.1 MMOL/L (ref 3.5–5.2)
PROT SERPL-MCNC: 6 G/DL (ref 6–8.5)
RBC # BLD AUTO: 4.61 10*6/MM3 (ref 4.14–5.8)
SODIUM SERPL-SCNC: 142 MMOL/L (ref 136–145)
TRIGL SERPL-MCNC: 130 MG/DL (ref 0–150)
TSH SERPL DL<=0.05 MIU/L-ACNC: 0.71 UIU/ML (ref 0.27–4.2)
URATE SERPL-MCNC: 6.1 MG/DL (ref 3.4–7)
VLDLC SERPL-MCNC: 23 MG/DL (ref 5–40)
WBC NRBC COR # BLD AUTO: 8.03 10*3/MM3 (ref 3.4–10.8)

## 2025-02-15 PROCEDURE — 85025 COMPLETE CBC W/AUTO DIFF WBC: CPT | Performed by: STUDENT IN AN ORGANIZED HEALTH CARE EDUCATION/TRAINING PROGRAM

## 2025-02-15 PROCEDURE — 84550 ASSAY OF BLOOD/URIC ACID: CPT | Performed by: STUDENT IN AN ORGANIZED HEALTH CARE EDUCATION/TRAINING PROGRAM

## 2025-02-15 PROCEDURE — 80053 COMPREHEN METABOLIC PANEL: CPT | Performed by: STUDENT IN AN ORGANIZED HEALTH CARE EDUCATION/TRAINING PROGRAM

## 2025-02-15 PROCEDURE — 83036 HEMOGLOBIN GLYCOSYLATED A1C: CPT | Performed by: STUDENT IN AN ORGANIZED HEALTH CARE EDUCATION/TRAINING PROGRAM

## 2025-02-15 PROCEDURE — 80061 LIPID PANEL: CPT | Performed by: STUDENT IN AN ORGANIZED HEALTH CARE EDUCATION/TRAINING PROGRAM

## 2025-02-15 PROCEDURE — 84443 ASSAY THYROID STIM HORMONE: CPT | Performed by: STUDENT IN AN ORGANIZED HEALTH CARE EDUCATION/TRAINING PROGRAM

## 2025-02-17 ENCOUNTER — OFFICE VISIT (OUTPATIENT)
Dept: INTERNAL MEDICINE | Facility: CLINIC | Age: 70
End: 2025-02-17
Payer: MEDICARE

## 2025-02-17 VITALS
TEMPERATURE: 97.9 F | BODY MASS INDEX: 47.74 KG/M2 | DIASTOLIC BLOOD PRESSURE: 73 MMHG | RESPIRATION RATE: 16 BRPM | OXYGEN SATURATION: 99 % | WEIGHT: 315 LBS | SYSTOLIC BLOOD PRESSURE: 152 MMHG | HEART RATE: 55 BPM | HEIGHT: 68 IN

## 2025-02-17 DIAGNOSIS — F32.0 CURRENT MILD EPISODE OF MAJOR DEPRESSIVE DISORDER WITHOUT PRIOR EPISODE: ICD-10-CM

## 2025-02-17 DIAGNOSIS — N13.8 BENIGN PROSTATIC HYPERPLASIA WITH URINARY OBSTRUCTION: ICD-10-CM

## 2025-02-17 DIAGNOSIS — E78.49 OTHER HYPERLIPIDEMIA: ICD-10-CM

## 2025-02-17 DIAGNOSIS — R41.3 MEMORY LOSS: ICD-10-CM

## 2025-02-17 DIAGNOSIS — I10 PRIMARY HYPERTENSION: ICD-10-CM

## 2025-02-17 DIAGNOSIS — I44.1 MOBITZ TYPE 1 SECOND DEGREE AV BLOCK: Primary | ICD-10-CM

## 2025-02-17 DIAGNOSIS — M1A.0720 IDIOPATHIC CHRONIC GOUT OF LEFT FOOT WITHOUT TOPHUS: ICD-10-CM

## 2025-02-17 DIAGNOSIS — G47.33 OBSTRUCTIVE SLEEP APNEA SYNDROME: ICD-10-CM

## 2025-02-17 DIAGNOSIS — N40.1 BENIGN PROSTATIC HYPERPLASIA WITH URINARY OBSTRUCTION: ICD-10-CM

## 2025-02-17 DIAGNOSIS — F41.9 ANXIETY: ICD-10-CM

## 2025-02-17 PROCEDURE — G2211 COMPLEX E/M VISIT ADD ON: HCPCS | Performed by: STUDENT IN AN ORGANIZED HEALTH CARE EDUCATION/TRAINING PROGRAM

## 2025-02-17 PROCEDURE — 3077F SYST BP >= 140 MM HG: CPT | Performed by: STUDENT IN AN ORGANIZED HEALTH CARE EDUCATION/TRAINING PROGRAM

## 2025-02-17 PROCEDURE — 1125F AMNT PAIN NOTED PAIN PRSNT: CPT | Performed by: STUDENT IN AN ORGANIZED HEALTH CARE EDUCATION/TRAINING PROGRAM

## 2025-02-17 PROCEDURE — 99214 OFFICE O/P EST MOD 30 MIN: CPT | Performed by: STUDENT IN AN ORGANIZED HEALTH CARE EDUCATION/TRAINING PROGRAM

## 2025-02-17 PROCEDURE — 3078F DIAST BP <80 MM HG: CPT | Performed by: STUDENT IN AN ORGANIZED HEALTH CARE EDUCATION/TRAINING PROGRAM

## 2025-02-17 NOTE — PROGRESS NOTES
Office Note     Name: Zbigniew Andrews    : 1955     MRN: [id number]     Chief Complaint  Hypertension (6 month follow up/)    Subjective     History of Present Illness:  Zbigniew Andrews is a 69 y.o. male who presents today for chronic conditions.    HTN: on  losartan, follows with cardiology  Hyperlipidemia on atorvastatin  Anxiety & depression: on sertraline 25mg daily  BPH: on tamsulosin, stable symptoms   CKD last creatinine 1.32, stable advised to monitor weight, blood pressure and sugar  Gout: No recent flareups, on as needed NSAIDs     Dementia: on donepezil (started by MD in [place] ), positive family history of Alzheimer's disease follows with neurology  MARILY: on bipap at home nightly   BLE edema: on as needed bumex with potassium chloride  Mobitz type I second-degree AV block: Asymptomatic at this time, not a candidate for pacemaker at this time follows with cardiology     Sees pain clinic: on gabapentin, tramadol 50mg for sleep and methocarbamol due to hx of several fractures and concussion ()  Follows with dermatology, hx of skin cancer  Hx of lipomas, general surgeon in [place] plans to remove them     Colonoscopy: , Dr. Stovall (surgeon in [place]) normal  Nonsmoker  Family History:   Family History   Problem Relation Age of Onset    Hypertension Mother     Cancer Mother     Arthritis Mother     Asthma Mother     Ovarian cancer Mother     Hypertension Father     Heart disease Father     Arthritis Father     Kidney disease Father     Alzheimer's disease Father     Kidney failure Father     Arthritis Sister     Arthritis Brother     Hypertension Brother     Hepatitis Brother     Atrial fibrillation Brother     Alzheimer's disease Maternal Grandfather        Social History:   Social History     Socioeconomic History    Marital status:    Tobacco Use    Smoking status: Never     Passive exposure: Never    Smokeless tobacco: Never   Vaping Use    Vaping status: Never Used  "  Substance and Sexual Activity    Alcohol use: Not Currently     Comment: Social, quit [id number]    Drug use: Never    Sexual activity: Defer       Health Maintenance   Topic Date Due    COLORECTAL CANCER SCREENING  Never done    ANNUAL WELLNESS VISIT  02/26/2025    INFLUENZA VACCINE  03/31/2025 (Originally 7/1/2024)    ZOSTER VACCINE (1 of 2) 10/30/2025 (Originally 3/16/2004)    TDAP/TD VACCINES (1 - Tdap) 11/06/2025 (Originally 3/16/1973)    COVID-19 Vaccine (3 - 2024-25 season) 02/17/2026 (Originally 9/1/2024)    BMI FOLLOWUP  02/26/2025    LIPID PANEL  02/15/2026    HEPATITIS C SCREENING  Completed    Pneumococcal Vaccine 50+  Completed       Patient Care Team:  Mackenzie Dimas MD as PCP - General (Family Medicine)  Katina Cote APRN as Referring Physician (Family Medicine)  Darryl Chavarria MD as Interventional Cardiologist (Cardiology)    Objective     Vital Signs  /73   Pulse 55   Temp 97.9 °F (36.6 °C) (Infrared)   Resp 16   Ht 172.7 cm (67.99\")   Wt (!) 149 kg (328 lb)   SpO2 99%   BMI 49.88 kg/m²   Estimated body mass index is 49.88 kg/m² as calculated from the following:    Height as of this encounter: 172.7 cm (67.99\").    Weight as of this encounter: 149 kg (328 lb).        Physical Exam  Vitals and nursing note reviewed.   Constitutional:       Appearance: Normal appearance.   HENT:      Head: Normocephalic and atraumatic.   Cardiovascular:      Rate and Rhythm: Normal rate and regular rhythm.      Pulses: Normal pulses.      Heart sounds: Normal heart sounds.   Pulmonary:      Effort: Pulmonary effort is normal. No respiratory distress.      Breath sounds: Normal breath sounds. No wheezing, rhonchi or rales.   Abdominal:      General: Abdomen is flat. Bowel sounds are normal.      Palpations: Abdomen is soft.      Tenderness: There is no abdominal tenderness. There is no guarding.   Musculoskeletal:      Cervical back: Neck supple.   Skin:     General: Skin is warm.      " Capillary Refill: Capillary refill takes less than 2 seconds.   Neurological:      General: No focal deficit present.      Mental Status: He is alert. Mental status is at baseline.   Psychiatric:         Mood and Affect: Mood normal.         Behavior: Behavior normal.          Procedures     Assessment and Plan     Diagnoses and all orders for this visit:    1. Mobitz type 1 second degree AV block (Primary)  Assessment & Plan:  Stable, asymptomatic at this time, follows with cardiology      2. Primary hypertension  Assessment & Plan:  Mildly elevated, advised ambulatory blood pressure measurements at home.  Consider increasing losartan  Stable on current medication and dosage. Will continue current management. Refill medication as necessary. Follows with cardiology         3. Other hyperlipidemia  Assessment & Plan:  Stable on current medication and dosage. Will continue current management. Refill medication as necessary.  Atorvastatin 20       4. Benign prostatic hyperplasia with urinary obstruction  Assessment & Plan:  Stable on current medication and dosage. Will continue current management. Refill medication as necessary.  Tamsulosin daily      5. Current mild episode of major depressive disorder without prior episode  Assessment & Plan:  Stable on current medication and dosage. Will continue current management. Refill medication as necessary.  Sertraline 25 daily      6. Anxiety  Assessment & Plan:  Stable on current medication and dosage. Will continue current management. Refill medication as necessary.  Sertraline 25 daily      7. Idiopathic chronic gout of left foot without tophus  Assessment & Plan:  Asymptomatic, on as needed NSAIDs during flareups, no recent flare        8. Memory loss  Assessment & Plan:  Follows with neurology  On donepezil and memantine      9. Obstructive sleep apnea syndrome  Assessment & Plan:  Continue BiPAP every night           Counseling was given to patient for the following  topics: instructions for management and risks and benefits of treatment options.    Follow Up  Return in about 6 months (around 8/17/2025) for Medicare Wellness.    MD PAULO Ruiz [facility]  [address] [place] [id number] [phone]

## 2025-02-17 NOTE — ASSESSMENT & PLAN NOTE
Mildly elevated, advised ambulatory blood pressure measurements at home.  Consider increasing losartan  Stable on current medication and dosage. Will continue current management. Refill medication as necessary. Follows with cardiology

## 2025-02-20 ENCOUNTER — TELEPHONE (OUTPATIENT)
Dept: CARDIOLOGY | Facility: CLINIC | Age: 70
End: 2025-02-20
Payer: MEDICARE

## 2025-02-20 NOTE — TELEPHONE ENCOUNTER
Patient's wife called and says that Mr. Andrews's bp has been running 150-160 over 72 at night. She is wanting to know if his bp med needs to be adjusted. They are concerned with the top number being elevated. Please advise

## 2025-02-21 RX ORDER — AMLODIPINE BESYLATE 5 MG/1
5 TABLET ORAL DAILY
Qty: 90 TABLET | Refills: 3 | Status: SHIPPED | OUTPATIENT
Start: 2025-02-21

## 2025-03-10 DIAGNOSIS — T78.40XA ALLERGY, INITIAL ENCOUNTER: ICD-10-CM

## 2025-03-11 RX ORDER — LEVOCETIRIZINE DIHYDROCHLORIDE 5 MG/1
5 TABLET, FILM COATED ORAL EVERY EVENING
Qty: 90 TABLET | Refills: 1 | Status: SHIPPED | OUTPATIENT
Start: 2025-03-11

## 2025-03-13 DIAGNOSIS — R41.3 MEMORY LOSS: ICD-10-CM

## 2025-03-13 RX ORDER — DONEPEZIL HYDROCHLORIDE 10 MG/1
10 TABLET, FILM COATED ORAL DAILY
Qty: 90 TABLET | Refills: 1 | Status: SHIPPED | OUTPATIENT
Start: 2025-03-13

## 2025-03-25 RX ORDER — AMLODIPINE BESYLATE 5 MG/1
5 TABLET ORAL DAILY
Qty: 90 TABLET | Refills: 3 | Status: SHIPPED | OUTPATIENT
Start: 2025-03-25

## 2025-04-02 ENCOUNTER — TELEPHONE (OUTPATIENT)
Dept: INTERNAL MEDICINE | Facility: CLINIC | Age: 70
End: 2025-04-02
Payer: MEDICARE

## 2025-04-02 NOTE — TELEPHONE ENCOUNTER
Caller: MESFIN WEST    Relationship: Emergency Contact    Best call back number: [phone]     What is the best time to reach you: ANY    Who are you requesting to speak with (clinical staff, provider,  specific staff member): NURSE    Do you know the name of the person who called: WIFE    What was the call regarding: PATIENT HAVING SEVERE ALLERGIES.  WOULD LIKE TO TRY XYZAL NASAL SPRAY.     PHARMACY:  [facility]    Is it okay if the provider responds through MyChart: PHONE CALL PLEASE

## 2025-04-03 DIAGNOSIS — T78.40XA ALLERGY, INITIAL ENCOUNTER: ICD-10-CM

## 2025-04-03 RX ORDER — AZELASTINE 1 MG/ML
2 SPRAY, METERED NASAL 2 TIMES DAILY
Qty: 30 ML | Refills: 2 | Status: SHIPPED | OUTPATIENT
Start: 2025-04-03

## 2025-04-03 RX ORDER — LEVOCETIRIZINE DIHYDROCHLORIDE 5 MG/1
5 TABLET, FILM COATED ORAL EVERY EVENING
Qty: 90 TABLET | Refills: 1 | Status: SHIPPED | OUTPATIENT
Start: 2025-04-03

## 2025-04-03 RX ORDER — FLUTICASONE PROPIONATE 50 MCG
2 SPRAY, SUSPENSION (ML) NASAL DAILY
Qty: 11.1 G | Refills: 5 | Status: SHIPPED | OUTPATIENT
Start: 2025-04-03

## 2025-05-22 ENCOUNTER — OFFICE VISIT (OUTPATIENT)
Dept: NEUROLOGY | Facility: CLINIC | Age: 70
End: 2025-05-22
Payer: MEDICARE

## 2025-05-22 VITALS
OXYGEN SATURATION: 97 % | HEART RATE: 56 BPM | HEIGHT: 68 IN | RESPIRATION RATE: 14 BRPM | TEMPERATURE: 98.5 F | SYSTOLIC BLOOD PRESSURE: 104 MMHG | BODY MASS INDEX: 47.74 KG/M2 | WEIGHT: 315 LBS | DIASTOLIC BLOOD PRESSURE: 56 MMHG

## 2025-05-22 DIAGNOSIS — R41.89 SUBJECTIVE MEMORY COMPLAINTS: Primary | ICD-10-CM

## 2025-05-22 DIAGNOSIS — R41.3 MEMORY LOSS: ICD-10-CM

## 2025-05-22 RX ORDER — DONEPEZIL HYDROCHLORIDE 10 MG/1
10 TABLET, FILM COATED ORAL DAILY
Qty: 90 TABLET | Refills: 3 | Status: SHIPPED | OUTPATIENT
Start: 2025-05-22

## 2025-05-22 NOTE — PROGRESS NOTES
Follow Up Office Visit      Patient Name: Zbigniew Andrews  : 1955   MRN: [id number]     Chief Complaint:    Chief Complaint   Patient presents with    Follow-up     Memory; pt stopped taking Namenda due to it causing fatigue.        History of Present Illness: Zbigniew Andrews is a 70 y.o. male who is here today to follow up with neurology for subjective memory impairment. He was last seen in clinic  ([facility]). He is here today with his wife Mayda, who is assisting with history.     He is not concerned about his memory. He is drving. Wife is managing household finances. Zbigniew has been doing the household shopping. They are managing household maintaince together. He was taking Namenda but self Dcd due to fatigue. He has continued to take  Aricept 10 mg daily - does have bad dreams if he takes medication at night. Self ADLs.     Bipap therapy since  and this is managed by Sleep Specialist (Nita). He reports 100% compliance and makes him feel better!     Recent Imaging:      MRI Brain W/WO   +The ventricles are moderately dilated indicating atrophy appropriate for age. There is minimal small vessel ischemic disease.   CDUS  + Mild plaque within the bilateral carotid bulbs with less than 50% stenosis.   CT Head WO  + Cerebral volume loss      Pertinent Medical History: Allergic Rhinitis, HTN, HL, 1 degree AV Block, Morbid Obesity, S/P Bariatric Surgery, Renal Stones, BPH, Depression, Anxiety, Fibromyalgia, MARILY (treated with CPAP), OA, Gout, IBS, BCC, Ankylosing Spondylosis     Subjective      Review of Systems:   Review of Systems   Constitutional: Negative.    HENT: Negative.     Eyes: Negative.    Respiratory: Negative.     Cardiovascular: Negative.    Gastrointestinal: Negative.    Endocrine: Negative.    Genitourinary: Negative.    Musculoskeletal: Negative.    Skin: Negative.    Allergic/Immunologic: Negative.    Neurological:  Negative for memory problem.    Hematological: Negative.    Psychiatric/Behavioral: Negative.     All other systems reviewed and are negative.      I have reviewed and the following portions of the patient's history were updated as appropriate: past family history, past medical history, past social history, past surgical history and problem list.    Medications:     Current Outpatient Medications:     acetaminophen (TYLENOL) 500 MG tablet, Take 2 tablets by mouth Every 6 (Six) Hours As Needed for Mild Pain., Disp: , Rfl:     albuterol sulfate  (90 Base) MCG/ACT inhaler, Inhale 2 puffs Every 4 (Four) Hours As Needed for Wheezing or Shortness of Air., Disp: 18 g, Rfl: 0    amLODIPine (NORVASC) 5 MG tablet, TAKE 1 TABLET BY MOUTH DAILY, Disp: 90 tablet, Rfl: 3    Ascorbic Acid (VITAMIN C PO), Take  by mouth Daily., Disp: , Rfl:     atorvastatin (LIPITOR) 20 MG tablet, Take 1 tablet by mouth Daily., Disp: 90 tablet, Rfl: 3    azelastine (ASTELIN) 0.1 % nasal spray, Administer 2 sprays into the nostril(s) as directed by provider 2 (Two) Times a Day. Use in each nostril as directed, Disp: 30 mL, Rfl: 2    B Complex Vitamins (vitamin b complex) capsule capsule, Take 1 capsule by mouth Daily., Disp: , Rfl:     Cholecalciferol 50 MCG (2000 UT) tablet, Take 2 tablets by mouth Daily., Disp: , Rfl:     Cinnamon 500 MG capsule, Take 1 capsule by mouth Daily., Disp: , Rfl:     donepezil (ARICEPT) 10 MG tablet, Take 1 tablet by mouth Daily., Disp: 90 tablet, Rfl: 3    fluticasone (FLONASE) 50 MCG/ACT nasal spray, Administer 2 sprays into the nostril(s) as directed by provider Daily., Disp: 11.1 g, Rfl: 5    gabapentin (NEURONTIN) 600 MG tablet, Take 1 tablet by mouth 2 (Two) Times a Day., Disp: , Rfl:     glucosamine-chondroitin 500-400 MG capsule capsule, Take 1 capsule by mouth 2 (Two) Times a Day With Meals., Disp: , Rfl:     guaiFENesin (Mucinex) 600 MG 12 hr tablet, Take 400 mg by mouth Daily As Needed., Disp: , Rfl:     levocetirizine (XYZAL) 5 MG  tablet, Take 1 tablet by mouth Every Evening., Disp: 90 tablet, Rfl: 1    losartan (COZAAR) 50 MG tablet, TAKE 1 TABLET BY MOUTH EVERY DAY, Disp: 90 tablet, Rfl: 1    methocarbamol (ROBAXIN) 500 MG tablet, Take 2 tablets by mouth 3 (Three) Times a Day., Disp: , Rfl:     Misc Natural Products (BLACK CHERRY CONCENTRATE PO), Take  by mouth 2 (two) times a day., Disp: , Rfl:     Multiple Vitamins-Minerals (ZINC PO), Take  by mouth., Disp: , Rfl:     multivitamin (THERAGRAN) tablet tablet, Daily., Disp: , Rfl:     niacin (NIASPAN) 500 MG CR tablet, Take 1 tablet by mouth Every Night., Disp: , Rfl:     NON FORMULARY, , Disp: , Rfl:     sertraline (ZOLOFT) 25 MG tablet, TAKE 1 TABLET BY MOUTH DAILY, Disp: 90 tablet, Rfl: 1    tamsulosin (FLOMAX) 0.4 MG capsule 24 hr capsule, TAKE 1 CAPSULE BY MOUTH DAILY, Disp: 90 capsule, Rfl: 1    traMADol (ULTRAM) 50 MG tablet, Take 0.5 tablets by mouth Every 12 (Twelve) Hours As Needed for Moderate Pain. (Patient taking differently: Take 0.5 tablets by mouth Every 8 (Eight) Hours As Needed for Moderate Pain.), Disp: 3 tablet, Rfl: 0    bumetanide (BUMEX) 1 MG tablet, Take 1 tablet by mouth As Needed. (Patient not taking: Reported on 5/22/2025), Disp: , Rfl:     potassium chloride 10 MEQ CR tablet, Take 1 tablet by mouth As Needed. With bumex (Patient not taking: Reported on 5/22/2025), Disp: , Rfl:     predniSONE (DELTASONE) 20 MG tablet, Take 1 tablet by mouth 2 (Two) Times a Day. (Patient not taking: Reported on 5/22/2025), Disp: 10 tablet, Rfl: 0    topiramate (TOPAMAX) 50 MG tablet, Take 1 tablet by mouth every night at bedtime. (Patient not taking: Reported on 5/22/2025), Disp: , Rfl:     Turmeric (QC TUMERIC COMPLEX PO), Take  by mouth Daily. (Patient not taking: Reported on 5/22/2025), Disp: , Rfl:     Allergies:   No Known Allergies    Objective     Physical Exam:  Vital Signs:   Vitals:    05/22/25 1512   BP: 104/56   BP Location: Right arm   Patient Position: Sitting   Cuff  "Size: Adult   Pulse: 56   Resp: 14   Temp: 98.5 °F (36.9 °C)   TempSrc: Temporal   SpO2: 97%   Weight: (!) 153 kg (337 lb 12.8 oz)   Height: 172.7 cm (67.99\")   PainSc: 4    PainLoc: Neck     Body mass index is 51.37 kg/m².    Physical Exam  Vitals and nursing note reviewed.   Constitutional:       General: He is not in acute distress.     Appearance: Normal appearance.   HENT:      Head: Normocephalic.      Nose: Nose normal.      Mouth/Throat:      Mouth: Mucous membranes are moist.      Pharynx: Oropharynx is clear.   Eyes:      General: Lids are normal.      Extraocular Movements: Extraocular movements intact.      Conjunctiva/sclera: Conjunctivae normal.      Pupils: Pupils are equal, round, and reactive to light.   Musculoskeletal:      Cervical back: Normal range of motion and neck supple.   Skin:     General: Skin is warm and dry.      Capillary Refill: Capillary refill takes less than 2 seconds.   Neurological:      General: No focal deficit present.      Mental Status: He is oriented to person, place, and time.      Coordination: Romberg sign negative.   Psychiatric:         Mood and Affect: Mood normal.         Behavior: Behavior normal.         Neurological Exam  Mental Status  Awake, alert and oriented to person, place and time. Oriented to person, place, and time.    Cranial Nerves  CN II: Visual acuity is normal. Visual fields full to confrontation.  CN III, IV, VI: Extraocular movements intact bilaterally. Normal lids and orbits bilaterally. Pupils equal round and reactive to light bilaterally.  CN V: Facial sensation is normal.  CN VII: Full and symmetric facial movement.  CN IX, X: Palate elevates symmetrically. Normal gag reflex.  CN XI: Shoulder shrug strength is normal.  CN XII: Tongue midline without atrophy or fasciculations.    Motor  Normal muscle bulk throughout. No fasciculations present. Normal muscle tone. No abnormal involuntary movements. No pronator drift.    Sensory  Light touch is " normal in upper and lower extremities.     Coordination  Right: Finger-to-nose normal. Rapid alternating movement normal.Left: Finger-to-nose normal. Rapid alternating movement normal.    Gait  Casual gait: Normal stance. Normal stride length. Shuffling gait. Normal right arm swing. Normal left arm swing. Romberg is absent. Unable to rise from chair without using arms.       Assessment / Plan      Assessment/Plan:   Diagnoses and all orders for this visit:    1. Subjective memory complaints (Primary)  -     ATN Profile; Future  -     Vitamin B12; Future  -     Folate; Future  -     Methylmalonic Acid, Serum; Future  -     Heavy Metals Profile II, Blood; Future  -     RPR; Future  -     Ammonia; Future    2. Memory loss  -     donepezil (ARICEPT) 10 MG tablet; Take 1 tablet by mouth Daily.  Dispense: 90 tablet; Refill: 3         Follow Up:   Return in about 1 year (around 5/22/2026), or if symptoms worsen or fail to improve.    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

## 2025-06-06 RX ORDER — SERTRALINE HYDROCHLORIDE 25 MG/1
25 TABLET, FILM COATED ORAL DAILY
Qty: 90 TABLET | Refills: 1 | Status: SHIPPED | OUTPATIENT
Start: 2025-06-06

## 2025-06-06 RX ORDER — TAMSULOSIN HYDROCHLORIDE 0.4 MG/1
1 CAPSULE ORAL DAILY
Qty: 90 CAPSULE | Refills: 1 | Status: SHIPPED | OUTPATIENT
Start: 2025-06-06

## 2025-06-23 RX ORDER — LOSARTAN POTASSIUM 50 MG/1
50 TABLET ORAL DAILY
Qty: 90 TABLET | Refills: 2 | Status: SHIPPED | OUTPATIENT
Start: 2025-06-23

## 2025-07-03 DIAGNOSIS — E78.49 OTHER HYPERLIPIDEMIA: Chronic | ICD-10-CM

## 2025-07-03 RX ORDER — ATORVASTATIN CALCIUM 20 MG/1
20 TABLET, FILM COATED ORAL DAILY
Qty: 90 TABLET | Refills: 3 | Status: SHIPPED | OUTPATIENT
Start: 2025-07-03

## 2025-07-03 NOTE — TELEPHONE ENCOUNTER
[facility]

## 2025-08-29 ENCOUNTER — TELEPHONE (OUTPATIENT)
Dept: INTERNAL MEDICINE | Facility: CLINIC | Age: 70
End: 2025-08-29
Payer: MEDICARE

## (undated) DEVICE — NDL MAYO CATGUT 1/2 CIR 18244D PK/2

## (undated) DEVICE — NDL HYPO ECLPS SFTY 22G 1 1/2IN

## (undated) DEVICE — WRAP SHOULDER COLD THERAPY

## (undated) DEVICE — TUBING, SUCTION, 1/4" X 12', STRAIGHT: Brand: MEDLINE

## (undated) DEVICE — NDL SPINE 18G 31/2IN PNK

## (undated) DEVICE — ELECTRD MENISCAL STD 165MM

## (undated) DEVICE — KT POSTN SCHLEIN

## (undated) DEVICE — 1000 S-DRAPE TOWEL DRAPE 10/BX: Brand: STERI-DRAPE™

## (undated) DEVICE — TBG ARTHSCP PT W CONN/REDUC 8FT

## (undated) DEVICE — TBG ARTHSCP PUMP W CONN/REDUC 8FT

## (undated) DEVICE — PENCL ES MEGADINE EZ/CLEAN BUTN W/HOLSTR 10FT

## (undated) DEVICE — DRSNG SURG AQUACEL AG 9X25CM

## (undated) DEVICE — FLEXIBLE YANKAUER,MEDIUM TIP, NO VACUUM CONTROL: Brand: ARGYLE

## (undated) DEVICE — IRRIGATOR BULB ASEPTO 60CC STRL

## (undated) DEVICE — 4.5 MM FULL RADIUS STRAIGHT                                    BLADES, POWER/EP-1, YELLOW, PACKAGED                                    6 PER BOX, STERILE: Brand: DYONICS

## (undated) DEVICE — SLV SCD CALF HEMOFORCE DVT THERP REPROC MD

## (undated) DEVICE — SYR LUERLOK 30CC

## (undated) DEVICE — STRIP,CLOSURE,WOUND,MEDI-STRIP,1/2X4: Brand: MEDLINE

## (undated) DEVICE — SUT VIC 0 CT 36IN J958H

## (undated) DEVICE — PAD FLR QUICKSUITE SXN FLR 30X36IN

## (undated) DEVICE — VIOLET BRAIDED (POLYGLACTIN 910), SYNTHETIC ABSORBABLE SUTURE: Brand: COATED VICRYL

## (undated) DEVICE — DRSNG SURG AQUACEL AG 9X15CM

## (undated) DEVICE — 4.0 MM ACROMIONIZER STRAIGHT                                    BURRS, POWER/EP-1, MAUVE, 8000                                    MAXIMUM RPM, PACKAGED 6 PER BOX, STERILE: Brand: DYONICS

## (undated) DEVICE — NDL HYPO ECLPS SFTY 18G 1 1/2IN

## (undated) DEVICE — GLV SURG SENSICARE SLT PF LF 8 STRL

## (undated) DEVICE — SUT VIC 2/0 CT1 27IN J259H

## (undated) DEVICE — PK HIP GEN 20

## (undated) DEVICE — INTENDED FOR TISSUE SEPARATION, AND OTHER PROCEDURES THAT REQUIRE A SHARP SURGICAL BLADE TO PUNCTURE OR CUT.: Brand: BARD-PARKER ® CARBON RIB-BACK BLADES

## (undated) DEVICE — HEALIX BR ANCHOR W/ORTHOCORD TCP/PLGA ABSORBABLE ANCHOR (1) VIOLET (1) BLUE STRAND, SIZE 2 (5 METRIC) ORTHOCORD BRAIDED COMPOSITE SUTURE, 36 INCHES (91CM) 4.5MM
Type: IMPLANTABLE DEVICE | Site: SHOULDER | Status: NON-FUNCTIONAL
Brand: ORTHOCORD HEALIX BR
Removed: 2021-10-21

## (undated) DEVICE — 3M™ STERI-DRAPE™ U-DRAPE 1015: Brand: STERI-DRAPE™

## (undated) DEVICE — PROTECTOR EYE OPTI